# Patient Record
Sex: FEMALE | Race: WHITE | NOT HISPANIC OR LATINO | Employment: OTHER | URBAN - METROPOLITAN AREA
[De-identification: names, ages, dates, MRNs, and addresses within clinical notes are randomized per-mention and may not be internally consistent; named-entity substitution may affect disease eponyms.]

---

## 2017-06-03 ENCOUNTER — OFFICE VISIT (OUTPATIENT)
Dept: URGENT CARE | Facility: PHYSICIAN GROUP | Age: 77
End: 2017-06-03
Payer: MEDICARE

## 2017-06-03 VITALS
DIASTOLIC BLOOD PRESSURE: 74 MMHG | HEART RATE: 89 BPM | TEMPERATURE: 97 F | SYSTOLIC BLOOD PRESSURE: 142 MMHG | OXYGEN SATURATION: 95 %

## 2017-06-03 DIAGNOSIS — S00.35XA ACUTE FOREIGN BODY OF NOSE, INITIAL ENCOUNTER: Primary | ICD-10-CM

## 2017-06-03 PROCEDURE — 1036F TOBACCO NON-USER: CPT | Performed by: PHYSICIAN ASSISTANT

## 2017-06-03 PROCEDURE — 99203 OFFICE O/P NEW LOW 30 MIN: CPT | Performed by: PHYSICIAN ASSISTANT

## 2017-06-03 PROCEDURE — G8432 DEP SCR NOT DOC, RNG: HCPCS | Performed by: PHYSICIAN ASSISTANT

## 2017-06-03 PROCEDURE — 4040F PNEUMOC VAC/ADMIN/RCVD: CPT | Mod: 8P | Performed by: PHYSICIAN ASSISTANT

## 2017-06-03 PROCEDURE — G8421 BMI NOT CALCULATED: HCPCS | Performed by: PHYSICIAN ASSISTANT

## 2017-06-03 PROCEDURE — 1101F PT FALLS ASSESS-DOCD LE1/YR: CPT | Mod: 8P | Performed by: PHYSICIAN ASSISTANT

## 2017-06-03 NOTE — PATIENT INSTRUCTIONS
Nasal Foreign Body  A nasal foreign body is any object inserted inside the nose. Small children often insert small objects in the nose such as beads, coins, and small toys. Older children and adults may also accidentally get an object stuck inside the nose. Having a foreign body in the nose can cause serious medical problems. It may cause trouble breathing. If the object is swallowed and obstructs the esophagus, it can cause difficulty swallowing. A nasal foreign body often causes bleeding of the nose. Depending on the type of object, irritation in the nose may also occur. This can be more serious with certain objects, such as button batteries, magnets, and wooden objects. A foreign body may also cause thick, yellowish, or bad smelling drainage from the nose, as well as pain in the nose and face. These problems can be signs of infection. Nasal foreign bodies require immediate evaluation by a medical professional.   HOME CARE INSTRUCTIONS   · Do not try to remove the object without getting medical advice. Trying to grab the object may push it deeper and make it more difficult to remove.  · Breathe through the mouth until you can see your caregiver. This helps prevent inhalation of the object.  · Keep small objects out of reach of young children.  · Tell your child not to put objects into his or her nose. Tell your child to get help from an adult right away if it happens again.  SEEK MEDICAL CARE IF:   · There is any trouble breathing.  · There is sudden difficulty swallowing, increased drooling, or new chest pain.  · There is any bleeding from the nose.  · The nose continues to drain. An object may still be in the nose.  · A fever, earache, headache, pain in the cheeks or around the eyes, or yellow-green nasal discharge develops. These are signs of a possible sinus infection or ear infection from obstruction of the normal nasal airway.  MAKE SURE YOU:  · Understand these instructions.  · Will watch your  condition.  · Will get help right away if you are not doing well or get worse.     This information is not intended to replace advice given to you by your health care provider. Make sure you discuss any questions you have with your health care provider.     Document Released: 12/15/2001 Document Revised: 03/11/2013 Document Reviewed: 06/07/2012  [employer] Interactive Patient Education ©2016 [employer]

## 2017-06-19 ENCOUNTER — HOSPITAL ENCOUNTER (OUTPATIENT)
Dept: RADIOLOGY | Facility: MEDICAL CENTER | Age: 77
End: 2017-06-19
Attending: GENERAL PRACTICE
Payer: MEDICARE

## 2017-06-19 DIAGNOSIS — S99.922A FOOT INJURY, LEFT, INITIAL ENCOUNTER: ICD-10-CM

## 2017-06-19 PROCEDURE — 73630 X-RAY EXAM OF FOOT: CPT | Mod: LT

## 2017-10-03 ENCOUNTER — HOSPITAL ENCOUNTER (OUTPATIENT)
Dept: LAB | Facility: MEDICAL CENTER | Age: 77
End: 2017-10-03
Attending: GENERAL PRACTICE
Payer: MEDICARE

## 2017-10-03 LAB
25(OH)D3 SERPL-MCNC: 60 NG/ML (ref 30–100)
ALBUMIN SERPL BCP-MCNC: 4.2 G/DL (ref 3.2–4.9)
ALBUMIN/GLOB SERPL: 1.5 G/DL
ALP SERPL-CCNC: 74 U/L (ref 30–99)
ALT SERPL-CCNC: 17 U/L (ref 2–50)
ANION GAP SERPL CALC-SCNC: 8 MMOL/L (ref 0–11.9)
AST SERPL-CCNC: 17 U/L (ref 12–45)
BASOPHILS # BLD AUTO: 1 % (ref 0–1.8)
BASOPHILS # BLD: 0.05 K/UL (ref 0–0.12)
BILIRUB SERPL-MCNC: 0.7 MG/DL (ref 0.1–1.5)
BUN SERPL-MCNC: 12 MG/DL (ref 8–22)
CALCIUM SERPL-MCNC: 9.4 MG/DL (ref 8.5–10.5)
CHLORIDE SERPL-SCNC: 100 MMOL/L (ref 96–112)
CHOLEST SERPL-MCNC: 157 MG/DL (ref 100–199)
CO2 SERPL-SCNC: 28 MMOL/L (ref 20–33)
CREAT SERPL-MCNC: 0.63 MG/DL (ref 0.5–1.4)
CRP SERPL HS-MCNC: 0.19 MG/DL (ref 0–0.75)
EOSINOPHIL # BLD AUTO: 0.2 K/UL (ref 0–0.51)
EOSINOPHIL NFR BLD: 4 % (ref 0–6.9)
ERYTHROCYTE [DISTWIDTH] IN BLOOD BY AUTOMATED COUNT: 46.5 FL (ref 35.9–50)
EST. AVERAGE GLUCOSE BLD GHB EST-MCNC: 111 MG/DL
GFR SERPL CREATININE-BSD FRML MDRD: >60 ML/MIN/1.73 M 2
GLOBULIN SER CALC-MCNC: 2.8 G/DL (ref 1.9–3.5)
GLUCOSE SERPL-MCNC: 83 MG/DL (ref 65–99)
HBA1C MFR BLD: 5.5 % (ref 0–5.6)
HCT VFR BLD AUTO: 48.7 % (ref 37–47)
HDLC SERPL-MCNC: 60 MG/DL
HGB BLD-MCNC: 16.2 G/DL (ref 12–16)
IMM GRANULOCYTES # BLD AUTO: 0.01 K/UL (ref 0–0.11)
IMM GRANULOCYTES NFR BLD AUTO: 0.2 % (ref 0–0.9)
LDLC SERPL CALC-MCNC: 76 MG/DL
LYMPHOCYTES # BLD AUTO: 1.41 K/UL (ref 1–4.8)
LYMPHOCYTES NFR BLD: 28.5 % (ref 22–41)
MCH RBC QN AUTO: 31.6 PG (ref 27–33)
MCHC RBC AUTO-ENTMCNC: 33.3 G/DL (ref 33.6–35)
MCV RBC AUTO: 94.9 FL (ref 81.4–97.8)
MONOCYTES # BLD AUTO: 0.38 K/UL (ref 0–0.85)
MONOCYTES NFR BLD AUTO: 7.7 % (ref 0–13.4)
NEUTROPHILS # BLD AUTO: 2.89 K/UL (ref 2–7.15)
NEUTROPHILS NFR BLD: 58.6 % (ref 44–72)
NRBC # BLD AUTO: 0 K/UL
NRBC BLD AUTO-RTO: 0 /100 WBC
PLATELET # BLD AUTO: 192 K/UL (ref 164–446)
PMV BLD AUTO: 11.4 FL (ref 9–12.9)
POTASSIUM SERPL-SCNC: 4.4 MMOL/L (ref 3.6–5.5)
PROT SERPL-MCNC: 7 G/DL (ref 6–8.2)
RBC # BLD AUTO: 5.13 M/UL (ref 4.2–5.4)
SODIUM SERPL-SCNC: 136 MMOL/L (ref 135–145)
T3FREE SERPL-MCNC: 2.77 PG/ML (ref 2.4–4.2)
T4 FREE SERPL-MCNC: 0.68 NG/DL (ref 0.53–1.43)
TRIGL SERPL-MCNC: 103 MG/DL (ref 0–149)
TSH SERPL DL<=0.005 MIU/L-ACNC: 0.89 UIU/ML (ref 0.3–3.7)
VIT B12 SERPL-MCNC: 784 PG/ML (ref 211–911)
WBC # BLD AUTO: 4.9 K/UL (ref 4.8–10.8)

## 2017-10-03 PROCEDURE — 80061 LIPID PANEL: CPT

## 2017-10-03 PROCEDURE — 82607 VITAMIN B-12: CPT

## 2017-10-03 PROCEDURE — 85025 COMPLETE CBC W/AUTO DIFF WBC: CPT

## 2017-10-03 PROCEDURE — 84481 FREE ASSAY (FT-3): CPT

## 2017-10-03 PROCEDURE — 80053 COMPREHEN METABOLIC PANEL: CPT

## 2017-10-03 PROCEDURE — 83036 HEMOGLOBIN GLYCOSYLATED A1C: CPT

## 2017-10-03 PROCEDURE — 36415 COLL VENOUS BLD VENIPUNCTURE: CPT

## 2017-10-03 PROCEDURE — 82306 VITAMIN D 25 HYDROXY: CPT

## 2017-10-03 PROCEDURE — 86140 C-REACTIVE PROTEIN: CPT

## 2017-10-03 PROCEDURE — 84439 ASSAY OF FREE THYROXINE: CPT

## 2017-10-03 PROCEDURE — 84443 ASSAY THYROID STIM HORMONE: CPT

## 2017-11-27 ENCOUNTER — HOSPITAL ENCOUNTER (OUTPATIENT)
Dept: LAB | Facility: MEDICAL CENTER | Age: 77
End: 2017-11-27
Attending: GENERAL PRACTICE
Payer: MEDICARE

## 2017-11-27 LAB
T3 SERPL-MCNC: 73.4 NG/DL (ref 60–181)
T3FREE SERPL-MCNC: 2.85 PG/ML (ref 2.4–4.2)
T4 FREE SERPL-MCNC: 0.83 NG/DL (ref 0.53–1.43)
T4 SERPL-MCNC: 6.7 UG/DL (ref 4–12)
TSH SERPL DL<=0.005 MIU/L-ACNC: 2.38 UIU/ML (ref 0.3–3.7)

## 2017-11-27 PROCEDURE — 84443 ASSAY THYROID STIM HORMONE: CPT

## 2017-11-27 PROCEDURE — 84439 ASSAY OF FREE THYROXINE: CPT

## 2017-11-27 PROCEDURE — 84481 FREE ASSAY (FT-3): CPT

## 2017-11-27 PROCEDURE — 84480 ASSAY TRIIODOTHYRONINE (T3): CPT

## 2017-11-27 PROCEDURE — 36415 COLL VENOUS BLD VENIPUNCTURE: CPT

## 2018-01-12 ENCOUNTER — HOSPITAL ENCOUNTER (OUTPATIENT)
Dept: LAB | Facility: MEDICAL CENTER | Age: 78
End: 2018-01-12
Attending: GENERAL PRACTICE
Payer: MEDICARE

## 2018-01-12 LAB
T3FREE SERPL-MCNC: 3.08 PG/ML (ref 2.4–4.2)
T4 FREE SERPL-MCNC: 0.87 NG/DL (ref 0.53–1.43)
TSH SERPL DL<=0.005 MIU/L-ACNC: 2.79 UIU/ML (ref 0.38–5.33)

## 2018-01-12 PROCEDURE — 84443 ASSAY THYROID STIM HORMONE: CPT

## 2018-01-12 PROCEDURE — 36415 COLL VENOUS BLD VENIPUNCTURE: CPT

## 2018-01-12 PROCEDURE — 84481 FREE ASSAY (FT-3): CPT

## 2018-01-12 PROCEDURE — 84439 ASSAY OF FREE THYROXINE: CPT

## 2018-02-12 ENCOUNTER — HOSPITAL ENCOUNTER (OUTPATIENT)
Dept: LAB | Facility: MEDICAL CENTER | Age: 78
End: 2018-02-12
Attending: GENERAL PRACTICE
Payer: MEDICARE

## 2018-02-12 LAB
T4 FREE SERPL-MCNC: 0.98 NG/DL (ref 0.53–1.43)
T4 SERPL-MCNC: 5.4 UG/DL (ref 4–12)
TSH SERPL DL<=0.005 MIU/L-ACNC: 3.17 UIU/ML (ref 0.38–5.33)

## 2018-02-12 PROCEDURE — 84439 ASSAY OF FREE THYROXINE: CPT

## 2018-02-12 PROCEDURE — 84443 ASSAY THYROID STIM HORMONE: CPT

## 2018-02-12 PROCEDURE — 36415 COLL VENOUS BLD VENIPUNCTURE: CPT

## 2018-05-25 ENCOUNTER — OFFICE VISIT (OUTPATIENT)
Dept: URGENT CARE | Facility: CLINIC | Age: 78
End: 2018-05-25
Payer: MEDICARE

## 2018-05-25 VITALS
TEMPERATURE: 98.5 F | WEIGHT: 158 LBS | SYSTOLIC BLOOD PRESSURE: 160 MMHG | RESPIRATION RATE: 16 BRPM | HEART RATE: 77 BPM | BODY MASS INDEX: 26.98 KG/M2 | HEIGHT: 64 IN | DIASTOLIC BLOOD PRESSURE: 70 MMHG | OXYGEN SATURATION: 93 %

## 2018-05-25 DIAGNOSIS — B02.9 HERPES ZOSTER WITHOUT COMPLICATION: ICD-10-CM

## 2018-05-25 PROCEDURE — 99214 OFFICE O/P EST MOD 30 MIN: CPT | Performed by: PHYSICIAN ASSISTANT

## 2018-05-25 RX ORDER — VALACYCLOVIR HYDROCHLORIDE 1 G/1
1000 TABLET, FILM COATED ORAL 3 TIMES DAILY
Qty: 21 TAB | Refills: 0 | Status: SHIPPED | OUTPATIENT
Start: 2018-05-25 | End: 2018-06-01

## 2018-05-25 RX ORDER — UBIDECARENONE 75 MG
100 CAPSULE ORAL DAILY
COMMUNITY
End: 2022-10-12

## 2018-05-25 ASSESSMENT — ENCOUNTER SYMPTOMS
SHORTNESS OF BREATH: 0
COUGH: 0
PALPITATIONS: 0
FEVER: 0

## 2018-05-25 ASSESSMENT — PATIENT HEALTH QUESTIONNAIRE - PHQ9: CLINICAL INTERPRETATION OF PHQ2 SCORE: 0

## 2018-05-25 NOTE — PROGRESS NOTES
Subjective:      Norah Mchugh is a 77 y.o. female who presents with Herpes Zoster (rash on left flank, front to back, x4days irritating)            Rash   This is a new problem. The current episode started in the past 7 days. The problem is unchanged. Location: left torso and back. The rash is characterized by blistering, redness and pain. She was exposed to nothing. Pertinent negatives include no cough, fever or shortness of breath. Past treatments include nothing.       Review of Systems   Constitutional: Negative for fever and malaise/fatigue.   Respiratory: Negative for cough and shortness of breath.    Cardiovascular: Negative for chest pain and palpitations.   Skin: Positive for itching and rash.   All other systems reviewed and are negative.    PMH:  has a past medical history of CATARACT; Glaucoma; Hypertension; Pain (05-18-12); Polio; and Unspecified disorder of thyroid.  MEDS:   Current Outpatient Prescriptions:   •  cyanocobalamin (VITAMIN B-12) 100 MCG Tab, Take 100 mcg by mouth every day., Disp: , Rfl:   •  valacyclovir (VALTREX) 1 GM Tab, Take 1 Tab by mouth 3 times a day for 7 days., Disp: 21 Tab, Rfl: 0  •  ARMOUR THYROID PO, Take 65 mg by mouth every day.  , Disp: , Rfl:   •  Cholecalciferol (VITAMIN D3) 5000 UNIT TABS, Take  by mouth every day., Disp: , Rfl:   •  NATURE-THROID 65 MG tablet, Take 65 mg by mouth., Disp: , Rfl: 3  •  Non Formulary Request, Vessel care, Disp: , Rfl:   •  Non Formulary Request, Blue louie, three tabs, BID., Disp: , Rfl:   •  docosahexanoic acid (OMEGA 3 FA) 1000 MG CAPS, Take 1,000 mg by mouth 3 times a day, with meals., Disp: , Rfl:   •  rosuvastatin (CRESTOR) 5 MG TABS, Take 1 Tab by mouth every evening., Disp: 30 Tab, Rfl: 11  •  atorvastatin (LIPITOR) 10 MG TABS, Take 1 Tab by mouth every day., Disp: 30 Tab, Rfl: 11  •  aspirin EC (ECOTRIN) 81 MG TBEC, Take 1 Tab by mouth every day., Disp: 30 Tab, Rfl: 11  •  Ascorbic Acid (VITAMIN C PO), Take 2,000 mg  "by mouth every day., Disp: , Rfl:   ALLERGIES:   Allergies   Allergen Reactions   • Milk Protein Nausea   • Other Drug Nausea     An antibiotic, name unknown     SURGHX:   Past Surgical History:   Procedure Laterality Date   • CATARACT PHACO WITH IOL  2/26/2013    Performed by David Bay M.D. at SURGERY SAME DAY [facility]S   • INCISION AND DRAINAGE ORTHOPEDIC  8/30/2012    Performed by GHUALM COE at SURGERY [facility] ORS   • HIP ARTHROPLASTY TOTAL  7/26/2012    Performed by Ghulam Coe M.D. at SURGERY [facility] ORS   • JOINT INJECTION DIAGNOSTIC  5/24/2012    Performed by GHULAM COE at SURGERY [facility] ORS   • OTHER ORTHOPEDIC SURGERY  2007    left hip fx (repair)   • OTHER ORTHOPEDIC SURGERY  2002    R foot    • BUNIONECTOMY      right, has to wear shoe when up   • OTHER      macular hole repair     SOCHX:  reports that she quit smoking about 46 years ago. Her smoking use included Cigarettes. She smoked 2.00 packs per day. She has never used smokeless tobacco. She reports that she drinks alcohol. She reports that she does not use drugs.  FH: Family history was reviewed, no pertinent findings to report  Medications, Allergies, and current problem list reviewed today in Epic       Objective:     /70   Pulse 77   Temp 36.9 °C (98.5 °F)   Resp 16   Ht 1.626 m (5' 4\")   Wt 71.7 kg (158 lb)   SpO2 93%   BMI 27.12 kg/m²      Physical Exam   Constitutional: She appears well-developed and well-nourished.   Cardiovascular: Normal rate, regular rhythm and normal heart sounds.    Pulmonary/Chest: Effort normal and breath sounds normal.   Skin: Skin is warm and dry. Rash noted. Rash is vesicular. There is erythema.        Psychiatric: She has a normal mood and affect. Her behavior is normal. Judgment and thought content normal.   Vitals reviewed.              Assessment/Plan:     1. Herpes zoster without complication    - valacyclovir (VALTREX) 1 GM Tab; Take 1 Tab by mouth 3 times a " day for 7 days.  Dispense: 21 Tab; Refill: 0    Differential diagnosis, natural history, supportive care discussed. Follow-up with primary care provider within 7-10 days, emergency room precautions discussed.  Patient and/or family appears understanding of information.  Handout and review of patients diagnosis and treatment was discussed extensively.

## 2018-06-14 ENCOUNTER — OFFICE VISIT (OUTPATIENT)
Dept: MEDICAL GROUP | Facility: PHYSICIAN GROUP | Age: 78
End: 2018-06-14
Payer: MEDICARE

## 2018-06-14 VITALS
TEMPERATURE: 97.9 F | DIASTOLIC BLOOD PRESSURE: 72 MMHG | OXYGEN SATURATION: 93 % | SYSTOLIC BLOOD PRESSURE: 132 MMHG | HEART RATE: 83 BPM | HEIGHT: 64 IN | BODY MASS INDEX: 27.31 KG/M2 | WEIGHT: 160 LBS

## 2018-06-14 DIAGNOSIS — R79.89 LOW SERUM VITAMIN D: ICD-10-CM

## 2018-06-14 DIAGNOSIS — E03.9 ACQUIRED HYPOTHYROIDISM: ICD-10-CM

## 2018-06-14 DIAGNOSIS — E78.5 DYSLIPIDEMIA: ICD-10-CM

## 2018-06-14 DIAGNOSIS — B02.29 OTHER POSTHERPETIC NERVOUS SYSTEM INVOLVEMENT: ICD-10-CM

## 2018-06-14 PROCEDURE — 99204 OFFICE O/P NEW MOD 45 MIN: CPT | Performed by: FAMILY MEDICINE

## 2018-06-14 ASSESSMENT — PAIN SCALES - GENERAL: PAINLEVEL: 5=MODERATE PAIN

## 2018-06-14 NOTE — PROGRESS NOTES
Norah Mchugh is a 77 y.o. female here for thyroid issues, herpes zoster outbreak, low vitamin D, elevated lipids  HPI: This is a pleasant 77-year-old female here today to establish care and presents for the following concerns:  Acquired hypothyroidism  This problem is new to me. Patient currently takes nature thyroid 65 mg daily; currently denies any issues or temperature intolerance, skin changes, hair changes, anxiety. She reports that it has been several months since her last lab evaluation and she is requesting new lab work today.    Other postherpetic nervous system involvement  This problem is new to me. Patient reports that she was diagnosed with herpes zoster outbreak proximally 2 weeks ago. She states that while she still has the rash over scan there are no pustules that are opened or drained. The skin is very itchy and she also has sharp stabbing pain in the area of the rash.    Low serum vitamin D  This problem is new to me. Reviewing patient's chart shows that her vitamin D level has been suboptimal in the past. She currently takes a little vitamin D but has not had her labs rechecked in several months    Dyslipidemia  This problem is new to me. Patient reports in the past that she has had some elevated lipids to be: She currently takes a little vitamin D and is not interested in taking statins anymore. She states that the statins had multiple side effects including muscle cramps and weakness. Given her history of polio she does not want to try these medications regardless of what her levels are    Current medicines (including changes today)  Current Outpatient Prescriptions   Medication Sig Dispense Refill   • OIL OF OREGANO PO Take  by mouth.     • cyanocobalamin (VITAMIN B-12) 100 MCG Tab Take 100 mcg by mouth every day.     • NATURE-THROID 65 MG tablet Take 65 mg by mouth.  3   • Ascorbic Acid (VITAMIN C PO) Take 2,000 mg by mouth every day.     • Cholecalciferol (VITAMIN D3) 5000 UNIT  "TABS Take  by mouth every day.     • docosahexanoic acid (OMEGA 3 FA) 1000 MG CAPS Take 1,000 mg by mouth 3 times a day, with meals.       No current facility-administered medications for this visit.      She  has a past medical history of CATARACT; Glaucoma; Hypertension; Pain (12); Polio; and Unspecified disorder of thyroid.  She  has a past surgical history that includes joint injection diagnostic (2012); other; bunionectomy; other orthopedic surgery (); other orthopedic surgery (); incision and drainage orthopedic (2012); cataract phaco with iol (2013); and hip arthroplasty total (2012).  Social History   Substance Use Topics   • Smoking status: Former Smoker     Packs/day: 2.00     Types: Cigarettes     Quit date: 1972   • Smokeless tobacco: Never Used   • Alcohol use Yes      Comment: 2 glasses of red wine/night      Social History     Social History Narrative   • No narrative on file     Family History   Problem Relation Age of Onset   • Heart Disease Mother    • Other Father      suicide   • Cancer Sister      liver   • Psychiatry Brother      depression   • Other Brother      suicide     Family Status   Relation Status   • Mother    • Father    • Sister    • Brother          ROS  + Itchy scan along with sharp pain in the area of herpes zoster outbreak; no chest pain, no shortness of breath, no abdominal pain  All other systems reviewed and are negative     Objective:     Blood pressure 132/72, pulse 83, temperature 36.6 °C (97.9 °F), height 1.626 m (5' 4\"), weight 72.6 kg (160 lb), SpO2 93 %. Body mass index is 27.46 kg/m².  Physical Exam:    Constitutional: Alert, no distress.  Skin: Warm, dry, good turgor, no rashes in visible areas.  Eye: Equal, round and reactive, conjunctiva clear, lids normal.  ENMT: Lips without lesions, good dentition, oropharynx clear.  Neck: Trachea midline, no masses, no thyromegaly. No cervical or " supraclavicular lymphadenopathy.  Respiratory: Unlabored respiratory effort, lungs clear to auscultation, no wheezes, no ronchi.  Cardiovascular: Normal S1, S2, no murmur, no edema.  Abdomen: Soft, non-tender, no masses, no hepatosplenomegaly.  Psych: Alert and oriented x3, normal affect and mood.        Assessment and Plan:   The following treatment plan was discussed     1. Acquired hypothyroidism  TSH    FREE THYROXINE    TRIIDOTHYRONINE    THYROID PEROXIDASE  (TPO) AB    Stable. Continue current medications; labs ordered for reevaluation; monitor for results   2. Other postherpetic nervous system involvement      Uncontrolled; will contact [facility] for possible topical cream to help with pain management   3. Low serum vitamin D  VITAMIN D,25 HYDROXY    Uncontrolled; continue supplementation; labs ordered for reevaluation; monitor for results   4. Dyslipidemia  COMP METABOLIC PANEL    LIPID PROFILE    stable; labs ordered for re-evaluation        Records requested.  Followup: Return in about 4 months (around 10/14/2018) for medication/lab review, Short.

## 2018-06-14 NOTE — ASSESSMENT & PLAN NOTE
This problem is new to me. Patient currently takes nature thyroid 65 mg daily; currently denies any issues or temperature intolerance, skin changes, hair changes, anxiety. She reports that it has been several months since her last lab evaluation and she is requesting new lab work today.

## 2018-06-14 NOTE — ASSESSMENT & PLAN NOTE
This problem is new to me. Reviewing patient's chart shows that her vitamin D level has been suboptimal in the past. She currently takes a little vitamin D but has not had her labs rechecked in several months

## 2018-06-14 NOTE — ASSESSMENT & PLAN NOTE
This problem is new to me. Patient reports in the past that she has had some elevated lipids to be: She currently takes a little vitamin D and is not interested in taking statins anymore. She states that the statins had multiple side effects including muscle cramps and weakness. Given her history of polio she does not want to try these medications regardless of what her levels are

## 2018-06-14 NOTE — ASSESSMENT & PLAN NOTE
This problem is new to me. Patient reports that she was diagnosed with herpes zoster outbreak proximally 2 weeks ago. She states that while she still has the rash over scan there are no pustules that are opened or drained. The skin is very itchy and she also has sharp stabbing pain in the area of the rash.

## 2018-06-19 ENCOUNTER — HOSPITAL ENCOUNTER (OUTPATIENT)
Dept: LAB | Facility: MEDICAL CENTER | Age: 78
End: 2018-06-19
Attending: FAMILY MEDICINE
Payer: MEDICARE

## 2018-06-19 DIAGNOSIS — E03.9 ACQUIRED HYPOTHYROIDISM: ICD-10-CM

## 2018-06-19 DIAGNOSIS — E78.5 DYSLIPIDEMIA: ICD-10-CM

## 2018-06-19 DIAGNOSIS — R79.89 LOW SERUM VITAMIN D: ICD-10-CM

## 2018-06-19 LAB
25(OH)D3 SERPL-MCNC: 57 NG/ML (ref 30–100)
ALBUMIN SERPL BCP-MCNC: 4.2 G/DL (ref 3.2–4.9)
ALBUMIN/GLOB SERPL: 1.6 G/DL
ALP SERPL-CCNC: 82 U/L (ref 30–99)
ALT SERPL-CCNC: 12 U/L (ref 2–50)
ANION GAP SERPL CALC-SCNC: 9 MMOL/L (ref 0–11.9)
AST SERPL-CCNC: 16 U/L (ref 12–45)
BILIRUB SERPL-MCNC: 0.8 MG/DL (ref 0.1–1.5)
BUN SERPL-MCNC: 9 MG/DL (ref 8–22)
CALCIUM SERPL-MCNC: 9.7 MG/DL (ref 8.5–10.5)
CHLORIDE SERPL-SCNC: 97 MMOL/L (ref 96–112)
CHOLEST SERPL-MCNC: 150 MG/DL (ref 100–199)
CO2 SERPL-SCNC: 30 MMOL/L (ref 20–33)
CREAT SERPL-MCNC: 0.64 MG/DL (ref 0.5–1.4)
GLOBULIN SER CALC-MCNC: 2.7 G/DL (ref 1.9–3.5)
GLUCOSE SERPL-MCNC: 95 MG/DL (ref 65–99)
HDLC SERPL-MCNC: 67 MG/DL
LDLC SERPL CALC-MCNC: 68 MG/DL
POTASSIUM SERPL-SCNC: 4.6 MMOL/L (ref 3.6–5.5)
PROT SERPL-MCNC: 6.9 G/DL (ref 6–8.2)
SODIUM SERPL-SCNC: 136 MMOL/L (ref 135–145)
T3 SERPL-MCNC: 54.1 NG/DL (ref 60–181)
T4 FREE SERPL-MCNC: 0.88 NG/DL (ref 0.53–1.43)
THYROPEROXIDASE AB SERPL-ACNC: 1.3 IU/ML (ref 0–9)
TRIGL SERPL-MCNC: 75 MG/DL (ref 0–149)
TSH SERPL DL<=0.005 MIU/L-ACNC: 1.34 UIU/ML (ref 0.38–5.33)

## 2018-06-19 PROCEDURE — 82306 VITAMIN D 25 HYDROXY: CPT

## 2018-06-19 PROCEDURE — 86376 MICROSOMAL ANTIBODY EACH: CPT

## 2018-06-19 PROCEDURE — 84443 ASSAY THYROID STIM HORMONE: CPT

## 2018-06-19 PROCEDURE — 36415 COLL VENOUS BLD VENIPUNCTURE: CPT

## 2018-06-19 PROCEDURE — 80061 LIPID PANEL: CPT

## 2018-06-19 PROCEDURE — 84480 ASSAY TRIIODOTHYRONINE (T3): CPT

## 2018-06-19 PROCEDURE — 84439 ASSAY OF FREE THYROXINE: CPT

## 2018-06-19 PROCEDURE — 80053 COMPREHEN METABOLIC PANEL: CPT

## 2018-06-20 RX ORDER — GABAPENTIN 100 MG/1
100 CAPSULE ORAL 3 TIMES DAILY PRN
Qty: 90 CAP | Refills: 1 | Status: SHIPPED | OUTPATIENT
Start: 2018-06-20 | End: 2018-09-12

## 2018-06-22 ENCOUNTER — TELEPHONE (OUTPATIENT)
Dept: MEDICAL GROUP | Facility: PHYSICIAN GROUP | Age: 78
End: 2018-06-22

## 2018-06-22 NOTE — TELEPHONE ENCOUNTER
Pt is requesting a refill of the cream she uses for her shingles. I do not see anything in her chart. I have left a message at [facility] to clarify what cream she is talking about. Do you happen to know?

## 2018-06-22 NOTE — TELEPHONE ENCOUNTER
I do but it was a verbal order - call [facility] pharmacy for verbal on refill    Delores Grande D.O.

## 2018-09-12 ENCOUNTER — OFFICE VISIT (OUTPATIENT)
Dept: MEDICAL GROUP | Age: 78
End: 2018-09-12
Payer: MEDICARE

## 2018-09-12 VITALS
DIASTOLIC BLOOD PRESSURE: 80 MMHG | HEIGHT: 64 IN | OXYGEN SATURATION: 92 % | HEART RATE: 80 BPM | SYSTOLIC BLOOD PRESSURE: 132 MMHG | TEMPERATURE: 97.1 F | WEIGHT: 160.1 LBS | BODY MASS INDEX: 27.33 KG/M2

## 2018-09-12 DIAGNOSIS — E03.9 ACQUIRED HYPOTHYROIDISM: ICD-10-CM

## 2018-09-12 DIAGNOSIS — M81.0 AGE-RELATED OSTEOPOROSIS WITHOUT CURRENT PATHOLOGICAL FRACTURE: ICD-10-CM

## 2018-09-12 DIAGNOSIS — H91.13 PRESBYCUSIS OF BOTH EARS: ICD-10-CM

## 2018-09-12 DIAGNOSIS — Z00.00 MEDICARE ANNUAL WELLNESS VISIT, SUBSEQUENT: ICD-10-CM

## 2018-09-12 DIAGNOSIS — H61.21 IMPACTED CERUMEN OF RIGHT EAR: ICD-10-CM

## 2018-09-12 DIAGNOSIS — A80.9 POLIOMYELITIS: ICD-10-CM

## 2018-09-12 DIAGNOSIS — J43.8 OTHER EMPHYSEMA (HCC): ICD-10-CM

## 2018-09-12 DIAGNOSIS — M16.12 PRIMARY OSTEOARTHRITIS OF LEFT HIP: ICD-10-CM

## 2018-09-12 DIAGNOSIS — Z87.891 FORMER SMOKER: ICD-10-CM

## 2018-09-12 PROBLEM — B02.29 OTHER POSTHERPETIC NERVOUS SYSTEM INVOLVEMENT: Status: RESOLVED | Noted: 2018-06-14 | Resolved: 2018-09-12

## 2018-09-12 PROBLEM — R79.89 LOW SERUM VITAMIN D: Status: RESOLVED | Noted: 2018-06-14 | Resolved: 2018-09-12

## 2018-09-12 PROBLEM — E78.5 DYSLIPIDEMIA: Status: RESOLVED | Noted: 2018-06-14 | Resolved: 2018-09-12

## 2018-09-12 PROCEDURE — G0439 PPPS, SUBSEQ VISIT: HCPCS | Mod: 25 | Performed by: FAMILY MEDICINE

## 2018-09-12 PROCEDURE — 69210 REMOVE IMPACTED EAR WAX UNI: CPT | Performed by: FAMILY MEDICINE

## 2018-09-12 ASSESSMENT — ACTIVITIES OF DAILY LIVING (ADL): BATHING_REQUIRES_ASSISTANCE: 0

## 2018-09-12 ASSESSMENT — PATIENT HEALTH QUESTIONNAIRE - PHQ9: CLINICAL INTERPRETATION OF PHQ2 SCORE: 0

## 2018-09-12 ASSESSMENT — ENCOUNTER SYMPTOMS: GENERAL WELL-BEING: EXCELLENT

## 2018-09-12 NOTE — PATIENT INSTRUCTIONS
Wash your nose with neti pot twice daily       2 sprays per nostril once daily before bed time (make sure to irrigate your nose with saline prior)      Take 1 pill daily

## 2018-09-12 NOTE — PROGRESS NOTES
Chief Complaint   Patient presents with   •  [facility] care, annual wellness visit        •         •               HPI:  Norah Mchugh is a 78 y.o. here for Medicare Annual Wellness Visit     Patient Active Problem List    Diagnosis Date Noted   • Former smoker 09/12/2018   • Other emphysema (HCC) 09/12/2018   • Age-related osteoporosis without current pathological fracture 09/12/2018   • Presbycusis of both ears 09/12/2018   • Acquired hypothyroidism 06/14/2018   • Agatston coronary artery calcium score 119, pred LAD 08/28/2014   • Poliomyelitis (historic) 08/28/2014   • Osteoarthritis of hip, left 07/26/2012       Current Outpatient Prescriptions   Medication Sig Dispense Refill   • OIL OF OREGANO PO Take  by mouth.     • cyanocobalamin (VITAMIN B-12) 100 MCG Tab Take 100 mcg by mouth every day.     • NATURE-THROID 65 MG tablet Take 65 mg by mouth.  3   • Cholecalciferol (VITAMIN D3) 5000 UNIT TABS Take  by mouth every day.       No current facility-administered medications for this visit.             Current supplements as per medication list.       Allergies: Milk protein and Other drug    Current social contact/activities: , lives with her , her children live nearby, there is no frequent with friends and family    She  reports that she quit smoking about 46 years ago. Her smoking use included Cigarettes. She has a 40.00 pack-year smoking history. She has never used smokeless tobacco. She reports that she drinks about 8.4 oz of alcohol per week . She reports that she does not use drugs.  Counseling given: No      DPA/Advanced Directive:  Patient does not have an Advanced Directive.  A packet and workshop information was given on Advanced Directives.    ROS:    Gait: Uses a wheelchair  Ostomy: No  Other tubes: No  Amputations: No  Chronic oxygen use: No  Last eye exam: 2016  Wears hearing aids: yes, bilateral   : Denies any urinary leakage during the last 6  months    Screening:  Depression Screening    Little interest or pleasure in doing things?  0 - not at all  Feeling down, depressed , or hopeless? 0 - not at all  Patient Health Questionnaire Score: 0   No depressive symptoms identified     Screening for Cognitive Impairment    Three Minute Recall (leader, season, table) 3/3    Royce clock face with all 12 numbers and set the hands to show 10 past 11.  Yes    No cognitive concerns identified     Fall Risk Assessment    Has the patient had two or more falls in the last year or any fall with injury in the last year?  No    Safety Assessment    Throw rugs on floor.  No  Handrails on all stairs.  No  Good lighting in all hallways.  No  Difficulty hearing.  Yes  Patient counseled about all safety risks that were identified.    Functional Assessment ADLs    Are there any barriers preventing you from cooking for yourself or meeting nutritional needs?  No.    Are there any barriers preventing you from driving safely or obtaining transportation?  No.    Are there any barriers preventing you from using a telephone or calling for help?  No.    Are there any barriers preventing you from shopping?  No.    Are there any barriers preventing you from taking care of your own finances?  No.    Are there any barriers preventing you from managing your medications?  No.    Are there any barriers preventing you from showering, bathing or dressing yourself?  No.    Are you currently engaging in any exercise or physical activity?  Yes.     What is your perception of your health?  Excellent.      Health Maintenance Summary                Annual Wellness Visit Overdue 1940     IMM DTaP/Tdap/Td Vaccine Overdue 6/20/1958     IMM ZOSTER VACCINES Overdue 6/20/1989     BONE DENSITY Overdue 6/20/2004     IMM PNEUMOCOCCAL 65+ (ADULT) LOW/MEDIUM RISK SERIES Overdue 6/20/2004     IMM INFLUENZA Overdue 9/1/2018           Patient Care Team:  Myriam Manzanares M.D. as PCP - General (Family  "Medicine)        Social History   Substance Use Topics   • Smoking status: Former Smoker     Packs/day: 2.00     Years: 20.00     Types: Cigarettes     Quit date: 1/1/1972   • Smokeless tobacco: Never Used   • Alcohol use 8.4 oz/week     14 Glasses of wine per week      Comment: 2 glasses of red wine/night      Family History   Problem Relation Age of Onset   • Heart Disease Mother    • Other Father         suicide   • Psychiatry Father    • Cancer Sister         liver   • Psychiatry Brother         depression   • Other Brother         suicide     She  has a past medical history of CATARACT; Glaucoma; Hypertension; Pain (05-18-12); Polio; and Unspecified disorder of thyroid.   Past Surgical History:   Procedure Laterality Date   • CATARACT PHACO WITH IOL  2/26/2013    Performed by David Bay M.D. at SURGERY SAME DAY [facility]   • INCISION AND DRAINAGE ORTHOPEDIC  8/30/2012    Performed by GHULAM COE at SURGERY [facility]   • HIP ARTHROPLASTY TOTAL  7/26/2012    Performed by Ghulam Coe M.D. at SURGERY [facility]   • JOINT INJECTION DIAGNOSTIC  5/24/2012    Performed by GHULAM COE at SURGERY [facility]   • OTHER ORTHOPEDIC SURGERY  2007    left hip fx (repair)   • OTHER ORTHOPEDIC SURGERY  2002    R foot    • BUNIONECTOMY      right, has to wear shoe when up   • OTHER      macular hole repair       Exam:   Blood pressure 132/80, pulse 80, temperature 36.2 °C (97.1 °F), height 1.626 m (5' 4\"), weight 72.6 kg (160 lb 1.6 oz), SpO2 92 %, not currently breastfeeding. Body mass index is 27.48 kg/m².    Hearing good.    Dentition fair  Alert, oriented in no acute distress.  Eye contact is good, speech goal directed, affect calm    Assessment and Plan. The following treatment and monitoring plan is recommended:      1. Poliomyelitis (historic)  Patient was diagnosed with poliomyelitis when she was a child.  However, patient did not have any ambulation difficulty or problems until she " was in her 60s.  She suffered a ground-level fall couple years ago leading to left hip fracture requiring total hip replacement.  Unfortunately, she suffered postop infection needing I&D.  She later developed chronic, worsening right knee pain further affecting her ambulation.  The strength of her lower extremity continues to decline further despite physical therapy.  Patient is now wheelchair-bound.  Patient is not interested in further treatment or evaluation at this point.  She also declined physical therapy.  She exercises regularly to improve the strength of upper extremity.  She is able to transfer herself safely.  Patient is pleased with her status and does not wish to have more treatment or evaluation.  Plans:   -Home safety discussed with patient  -Continue regular exercise of the upper and lower extremity    2. Former smoker  3. Other emphysema (HCC)  Patient had history of chronic tobacco abuse.  She used to smoke 2 pack per day for 20 years.  She quit in 1972.  She denies any respiratory symptoms.  Incidental findings of pulmonary emphysema per CT scan done in 2014.  Plans:  -We will monitor.    3. Acquired hypothyroidism  Chronic, currently taking nature-thyroid 65 mg daily.  Her recent thyroid function test was normal in June 2018.  Plans:  -Continue nature thyroid at current dose    5. Primary osteoarthritis of left hip  Status post total hip replacement in 2007.  Patient is wheelchair-bound due to musculoskeletal weakness related to polio.  She denies any pain, will monitor.    6. Age-related osteoporosis without current pathological fracture  Patient was diagnosed with osteoporosis by previous PCP.  Records are not available for review.  Patient declined any treatment at this time. Plans:   - Calcium and Vitamin D  - Weight-bearing aerobic and strengthening exercises can decrease risk of falls and fractures by improving muscle strength, coordination, balance, and mobility  - Limit caffeine intake to  2 or fewer servings per day  - Limit alcohol consumption to one drink per day  - pt was counseled on fall risk prevention and annual eye exam    7. Presbycusis of both ears  History of chronic presbycusis, wear hearing aids bilaterally.    8. Cerumen impaction, right  Exam was notable for moderate cerumen impaction on the right.  Plans:  - ear lavage    Procedure note: ear wax removal.   Right ear was partially irrigated by MA. I personally removed the rest of ear wax using a lighted curette pt tolerated the procedure well, no immediate complication noted.     Patient declined all vaccines.    Services suggested: No services needed at this time  Health Care Screening: Age-appropriate preventive services recommended by USPTF and ACIP covered by Medicare were discussed today. Services ordered if indicated and agreed upon by the patient.  Referrals offered: Community-based lifestyle interventions to reduce health risks and promote self-management and wellness, fall prevention, nutrition, physical activity, tobacco-use cessation, weight loss, and mental health services as per orders if indicated.    Discussion today about general wellness and lifestyle habits:    · Prevent falls and reduce trip hazards; Cautioned about securing or removing rugs.  · Have a working fire alarm and carbon monoxide detector;   · Engage in regular physical activity and social activities     Follow-up: Return in about 6 months (around 3/12/2019) for Multiple issues.

## 2018-10-29 ENCOUNTER — TELEPHONE (OUTPATIENT)
Dept: MEDICAL GROUP | Age: 78
End: 2018-10-29

## 2018-10-29 ENCOUNTER — APPOINTMENT (OUTPATIENT)
Dept: MEDICAL GROUP | Age: 78
End: 2018-10-29
Payer: MEDICARE

## 2018-10-29 DIAGNOSIS — H61.20 WAX IN EAR: ICD-10-CM

## 2018-10-29 NOTE — TELEPHONE ENCOUNTER
1. Caller Name: Norah Mchugh    Call Back Number: [phone] (home)         Patient approves a detailed voicemail message: N\A    Patient is on the MA Schedule today for Ear lavage     SPECIFIC Action To Be Taken: Orders pending, please sign.

## 2018-11-09 ENCOUNTER — OFFICE VISIT (OUTPATIENT)
Dept: MEDICAL GROUP | Age: 78
End: 2018-11-09
Payer: MEDICARE

## 2018-11-09 VITALS
OXYGEN SATURATION: 90 % | DIASTOLIC BLOOD PRESSURE: 72 MMHG | TEMPERATURE: 98.4 F | BODY MASS INDEX: 27.31 KG/M2 | WEIGHT: 160 LBS | HEART RATE: 82 BPM | SYSTOLIC BLOOD PRESSURE: 128 MMHG | HEIGHT: 64 IN

## 2018-11-09 DIAGNOSIS — J20.9 ACUTE BRONCHITIS, UNSPECIFIED ORGANISM: ICD-10-CM

## 2018-11-09 LAB
FLUAV+FLUBV AG SPEC QL IA: NEGATIVE
INT CON NEG: NEGATIVE
INT CON POS: POSITIVE

## 2018-11-09 PROCEDURE — 99202 OFFICE O/P NEW SF 15 MIN: CPT | Performed by: INTERNAL MEDICINE

## 2018-11-09 PROCEDURE — 87804 INFLUENZA ASSAY W/OPTIC: CPT | Performed by: INTERNAL MEDICINE

## 2018-11-09 RX ORDER — GUAIFENESIN AND DEXTROMETHORPHAN HYDROBROMIDE 100; 10 MG/5ML; MG/5ML
10 SOLUTION ORAL EVERY 6 HOURS PRN
Qty: 560 ML | Refills: 1 | Status: SHIPPED | OUTPATIENT
Start: 2018-11-09 | End: 2018-11-15

## 2018-11-09 RX ORDER — AMOXICILLIN AND CLAVULANATE POTASSIUM 875; 125 MG/1; MG/1
1 TABLET, FILM COATED ORAL 2 TIMES DAILY
Qty: 20 TAB | Refills: 0 | Status: SHIPPED | OUTPATIENT
Start: 2018-11-09 | End: 2018-11-15

## 2018-11-09 ASSESSMENT — ENCOUNTER SYMPTOMS
WHEEZING: 0
FEVER: 0
RHINORRHEA: 1
COUGH: 1
HEADACHES: 0

## 2018-11-09 ASSESSMENT — COPD QUESTIONNAIRES: COPD: 1

## 2018-11-09 NOTE — PROGRESS NOTES
"Subjective:      Norah Mchugh is a 78 y.o. female who presents with Cough (x4 days non productive)          .enc  Cough   This is a new problem. The current episode started in the past 7 days. The problem has been gradually improving. The problem occurs every few minutes. The cough is non-productive. Associated symptoms include rhinorrhea. Pertinent negatives include no fever, headaches, rash or wheezing. The symptoms are aggravated by cold air and lying down. Risk factors for lung disease include smoking/tobacco exposure. She has tried OTC cough suppressant for the symptoms. The treatment provided mild relief. Her past medical history is significant for COPD. There is no history of bronchiectasis, bronchitis, environmental allergies or pneumonia.       Review of Systems   Constitutional: Negative for fever.   HENT: Positive for rhinorrhea.    Respiratory: Positive for cough. Negative for wheezing.    Skin: Negative for rash.   Neurological: Negative for headaches.   Endo/Heme/Allergies: Negative for environmental allergies.          Objective:     /72 (BP Location: Right arm, Patient Position: Sitting)   Pulse 82   Temp 36.9 °C (98.4 °F) (Temporal)   Ht 1.626 m (5' 4.02\")   Wt 72.6 kg (160 lb)   SpO2 90%   BMI 27.45 kg/m²      Physical Exam   HENT:   Right Ear: External ear normal.   Left Ear: External ear normal.   Nose: Nose normal.   Mouth/Throat: Oropharynx is clear and moist. No oropharyngeal exudate.   Neck: No JVD present. No tracheal deviation present.   Pulmonary/Chest: Effort normal and breath sounds normal. No stridor. No respiratory distress. She has no wheezes. She has no rales. She exhibits no tenderness.   Lymphadenopathy:     She has no cervical adenopathy.          No visits with results within 1 Month(s) from this visit.   Latest known visit with results is:   Hospital Outpatient Visit on 06/19/2018   Component Date Value   • TSH 06/19/2018 1.340    • Free T-4 06/19/2018 " 0.88    • Sodium 06/19/2018 136    • Potassium 06/19/2018 4.6    • Chloride 06/19/2018 97    • Co2 06/19/2018 30    • Anion Gap 06/19/2018 9.0    • Glucose 06/19/2018 95    • Bun 06/19/2018 9    • Creatinine 06/19/2018 0.64    • Calcium 06/19/2018 9.7    • AST(SGOT) 06/19/2018 16    • ALT(SGPT) 06/19/2018 12    • Alkaline Phosphatase 06/19/2018 82    • Total Bilirubin 06/19/2018 0.8    • Albumin 06/19/2018 4.2    • Total Protein 06/19/2018 6.9    • Globulin 06/19/2018 2.7    • A-G Ratio 06/19/2018 1.6    • Cholesterol,Tot 06/19/2018 150    • Triglycerides 06/19/2018 75    • HDL 06/19/2018 67    • LDL 06/19/2018 68    • 25-Hydroxy   Vitamin D 25 06/19/2018 57    • T3 06/19/2018 54.1*   • Microsomal -Tpo- Abs 06/19/2018 1.3    • GFR If  06/19/2018 >60    • GFR If Non  Ameri* 06/19/2018 >60       Lab Results   Component Value Date/Time    HBA1C 5.5 10/03/2017 08:34 AM    HBA1C 5.3 06/03/2015 07:43 AM     Lab Results   Component Value Date/Time    SODIUM 136 06/19/2018 09:35 AM    POTASSIUM 4.6 06/19/2018 09:35 AM    CHLORIDE 97 06/19/2018 09:35 AM    CO2 30 06/19/2018 09:35 AM    GLUCOSE 95 06/19/2018 09:35 AM    BUN 9 06/19/2018 09:35 AM    CREATININE 0.64 06/19/2018 09:35 AM    ALKPHOSPHAT 82 06/19/2018 09:35 AM    ASTSGOT 16 06/19/2018 09:35 AM    ALTSGPT 12 06/19/2018 09:35 AM    TBILIRUBIN 0.8 06/19/2018 09:35 AM     Lab Results   Component Value Date/Time    INR 1.03 07/26/2012 12:40 PM     Lab Results   Component Value Date/Time    CHOLSTRLTOT 150 06/19/2018 09:35 AM    LDL 68 06/19/2018 09:35 AM    HDL 67 06/19/2018 09:35 AM    TRIGLYCERIDE 75 06/19/2018 09:35 AM       Lab Results   Component Value Date/Time    TESTOSTERONE 34 (H) 06/03/2015 07:43 AM     No results found for: TSH  Lab Results   Component Value Date/Time    FREET4 0.88 06/19/2018 09:35 AM    FREET4 0.98 02/12/2018 02:17 PM     No results found for: URICACID  No components found for: VITB12  Lab Results   Component Value  Date/Time    25HYDROXY 57 06/19/2018 09:35 AM    25HYDROXY 60 10/03/2017 08:34 AM          Assessment/Plan:     1. Acute bronchitis, unspecified organism        - amoxicillin-clavulanate (AUGMENTIN) 875-125 MG Tab; Take 1 Tab by mouth 2 times a day.  Dispense: 20 Tab; Refill: 0  - DX-CHEST-2 VIEWS; Future  - POCT Influenza A/B- neg

## 2018-11-13 ENCOUNTER — APPOINTMENT (OUTPATIENT)
Dept: MEDICAL GROUP | Age: 78
End: 2018-11-13
Payer: MEDICARE

## 2018-11-15 ENCOUNTER — OFFICE VISIT (OUTPATIENT)
Dept: MEDICAL GROUP | Facility: LAB | Age: 78
End: 2018-11-15
Payer: MEDICARE

## 2018-11-15 VITALS
OXYGEN SATURATION: 92 % | TEMPERATURE: 97.4 F | SYSTOLIC BLOOD PRESSURE: 146 MMHG | HEIGHT: 64 IN | HEART RATE: 80 BPM | DIASTOLIC BLOOD PRESSURE: 82 MMHG | RESPIRATION RATE: 16 BRPM | WEIGHT: 160 LBS | BODY MASS INDEX: 27.31 KG/M2

## 2018-11-15 DIAGNOSIS — J40 BRONCHITIS: ICD-10-CM

## 2018-11-15 DIAGNOSIS — J43.8 OTHER EMPHYSEMA (HCC): ICD-10-CM

## 2018-11-15 DIAGNOSIS — J98.01 BRONCHOSPASM: ICD-10-CM

## 2018-11-15 PROCEDURE — 99214 OFFICE O/P EST MOD 30 MIN: CPT | Mod: 25 | Performed by: FAMILY MEDICINE

## 2018-11-15 PROCEDURE — 94640 AIRWAY INHALATION TREATMENT: CPT | Performed by: FAMILY MEDICINE

## 2018-11-15 RX ORDER — ALBUTEROL SULFATE 2.5 MG/3ML
2.5 SOLUTION RESPIRATORY (INHALATION) ONCE
Status: COMPLETED | OUTPATIENT
Start: 2018-11-15 | End: 2018-11-15

## 2018-11-15 RX ORDER — ALBUTEROL SULFATE 90 UG/1
2 AEROSOL, METERED RESPIRATORY (INHALATION) EVERY 6 HOURS PRN
Qty: 8.5 G | Refills: 1 | Status: SHIPPED | OUTPATIENT
Start: 2018-11-15 | End: 2022-10-12

## 2018-11-15 RX ORDER — PREDNISONE 20 MG/1
60 TABLET ORAL DAILY
Qty: 15 TAB | Refills: 0 | Status: SHIPPED | OUTPATIENT
Start: 2018-11-15 | End: 2018-11-20

## 2018-11-15 RX ORDER — AZITHROMYCIN 250 MG/1
TABLET, FILM COATED ORAL
Qty: 6 TAB | Refills: 0 | Status: SHIPPED | OUTPATIENT
Start: 2018-11-15 | End: 2018-11-20

## 2018-11-15 RX ADMIN — ALBUTEROL SULFATE 2.5 MG: 2.5 SOLUTION RESPIRATORY (INHALATION) at 18:07

## 2018-11-16 NOTE — PATIENT INSTRUCTIONS
Bronchospasm, Adult  A bronchospasm is a spasm or tightening of the airways going into the lungs. During a bronchospasm breathing becomes more difficult because the airways get smaller. When this happens there can be coughing, a whistling sound when breathing (wheezing), and difficulty breathing. Bronchospasm is often associated with asthma, but not all patients who experience a bronchospasm have asthma.  What are the causes?  A bronchospasm is caused by inflammation or irritation of the airways. The inflammation or irritation may be triggered by:  · Allergies (such as to animals, pollen, food, or mold). Allergens that cause bronchospasm may cause wheezing immediately after exposure or many hours later.  · Infection. Viral infections are believed to be the most common cause of bronchospasm.  · Exercise.  · Irritants (such as pollution, cigarette smoke, strong odors, aerosol sprays, and paint fumes).  · Weather changes. Winds increase molds and pollens in the air. Rain refreshes the air by washing irritants out. Cold air may cause inflammation.  · Stress and emotional upset.  What are the signs or symptoms?  · Wheezing.  · Excessive nighttime coughing.  · Frequent or severe coughing with a simple cold.  · Chest tightness.  · Shortness of breath.  How is this diagnosed?  Bronchospasm is usually diagnosed through a history and physical exam. Tests, such as chest X-rays, are sometimes done to look for other conditions.  How is this treated?  · Inhaled medicines can be given to open up your airways and help you breathe. The medicines can be given using either an inhaler or a nebulizer machine.  · Corticosteroid medicines may be given for severe bronchospasm, usually when it is associated with asthma.  Follow these instructions at home:  · Always have a plan prepared for seeking medical care. Know when to call your health care provider and local emergency services (911 in the [place] Know where you can access local  emergency care.  · Only take medicines as directed by your health care provider.  · If you were prescribed an inhaler or nebulizer machine, ask your health care provider to explain how to use it correctly. Always use a spacer with your inhaler if you were given one.  · It is necessary to remain calm during an attack. Try to relax and breathe more slowly.  · Control your home environment in the following ways:  ¨ Change your heating and air conditioning filter at least once a month.  ¨ Limit your use of fireplaces and wood stoves.  ¨ Do not smoke and do not allow smoking in your home.  ¨ Avoid exposure to perfumes and fragrances.  ¨ Get rid of pests (such as roaches and mice) and their droppings.  ¨ Throw away plants if you see mold on them.  ¨ Keep your house clean and dust free.  ¨ Replace carpet with wood, tile, or vinyl linsey. Carpet can trap dander and dust.  ¨ Use allergy-proof pillows, mattress covers, and box spring covers.  ¨ Wash bed sheets and blankets every week in hot water and dry them in a dryer.  ¨ Use blankets that are made of polyester or cotton.  ¨ Wash hands frequently.  Contact a health care provider if:  · You have muscle aches.  · You have chest pain.  · The sputum changes from clear or white to yellow, green, gray, or bloody.  · The sputum you cough up gets thicker.  · There are problems that may be related to the medicine you are given, such as a rash, itching, swelling, or trouble breathing.  Get help right away if:  · You have worsening wheezing and coughing even after taking your prescribed medicines.  · You have increased difficulty breathing.  · You develop severe chest pain.  This information is not intended to replace advice given to you by your health care provider. Make sure you discuss any questions you have with your health care provider.  Document Released: 12/20/2004 Document Revised: 05/25/2017 Document Reviewed: 06/09/2014  [employer] Interactive Patient Education © 2017  [employer]  Bronchitis  Bronchitis is the body's way of reacting to injury and/or infection (inflammation) of the bronchi. Bronchi are the air tubes that extend from the windpipe into the lungs. If the inflammation becomes severe, it may cause shortness of breath.  CAUSES   Inflammation may be caused by:  · A virus.  · Germs (bacteria).  · Dust.  · Allergens.  · Pollutants and many other irritants.  The cells lining the bronchial tree are covered with tiny hairs (cilia). These constantly beat upward, away from the lungs, toward the mouth. This keeps the lungs free of pollutants. When these cells become too irritated and are unable to do their job, mucus begins to develop. This causes the characteristic cough of bronchitis. The cough clears the lungs when the cilia are unable to do their job. Without either of these protective mechanisms, the mucus would settle in the lungs. Then you would develop pneumonia.  Smoking is a common cause of bronchitis and can contribute to pneumonia. Stopping this habit is the single most important thing you can do to help yourself.  TREATMENT   · Your caregiver may prescribe an antibiotic if the cough is caused by bacteria. Also, medicines that open up your airways make it easier to breathe. Your caregiver may also recommend or prescribe an expectorant. It will loosen the mucus to be coughed up. Only take over-the-counter or prescription medicines for pain, discomfort, or fever as directed by your caregiver.  · Removing whatever causes the problem (smoking, for example) is critical to preventing the problem from getting worse.  · Cough suppressants may be prescribed for relief of cough symptoms.  · Inhaled medicines may be prescribed to help with symptoms now and to help prevent problems from returning.  · For those with recurrent (chronic) bronchitis, there may be a need for steroid medicines.  SEEK IMMEDIATE MEDICAL CARE IF:   · During treatment, you develop more pus-like mucus  (purulent sputum).  · You have a fever.  · Your baby is older than 3 months with a rectal temperature of 102° F (38.9° C) or higher.  · Your baby is 3 months old or younger with a rectal temperature of 100.4° F (38° C) or higher.  · You become progressively more ill.  · You have increased difficulty breathing, wheezing, or shortness of breath.  It is necessary to seek immediate medical care if you are elderly or sick from any other disease.  MAKE SURE YOU:   · Understand these instructions.  · Will watch your condition.  · Will get help right away if you are not doing well or get worse.  Document Released: 12/18/2006 Document Revised: 03/11/2013 Document Reviewed: 10/27/2009  [employer]® Patient Information ©2014 [employer].

## 2018-11-16 NOTE — PROGRESS NOTES
Subjective:     Chief Complaint   Patient presents with   • Bronchitis     possible bronchitis relapse       Norah Mchugh is a 78 y.o. female here today for evaluation and management of:    Bronchitis  Illness: 13 days of illness including: cough, clear sputum until today she had some green sputum   Symptoms negative for sore throat or sinus pressure  Treatments tried: none - she didn't take the Augmentin  Since onset, symptoms are worse - waxes and wanes  Similarly ill exposures: no  Medical history negative for asthma  She  reports that she quit smoking about 46 years ago. Her smoking use included Cigarettes. She has a 40.00 pack-year smoking history. She has never used smokeless tobacco.         Allergies   Allergen Reactions   • Milk Protein Nausea   • Other Drug Nausea     An antibiotic, name unknown       Current medicines (including changes today)  Current Outpatient Prescriptions   Medication Sig Dispense Refill   • azithromycin (ZITHROMAX) 250 MG Tab 2 tabs by mouth day 1, 1 tab by mouth days 2-5 6 Tab 0   • predniSONE (DELTASONE) 20 MG Tab Take 3 Tabs by mouth every day for 5 days. 15 Tab 0   • albuterol 108 (90 Base) MCG/ACT Aero Soln inhalation aerosol Inhale 2 Puffs by mouth every 6 hours as needed for Shortness of Breath. 8.5 g 1   • OIL OF OREGANO PO Take  by mouth.     • cyanocobalamin (VITAMIN B-12) 100 MCG Tab Take 100 mcg by mouth every day.     • NATURE-THROID 65 MG tablet Take 65 mg by mouth.  3   • Cholecalciferol (VITAMIN D3) 5000 UNIT TABS Take  by mouth every day.       Current Facility-Administered Medications   Medication Dose Route Frequency Provider Last Rate Last Dose   • albuterol (PROVENTIL) 2.5mg/3ml nebulizer solution 2.5 mg  2.5 mg Nebulization Once Jovanna Iglesias M.D.           She  has a past medical history of CATARACT; Glaucoma; Hypertension; Pain (05-18-12); Polio; and Unspecified disorder of thyroid.    Patient Active Problem List    Diagnosis Date Noted   •  "Bronchitis 11/15/2018   • Bronchospasm 11/15/2018   • Former smoker 09/12/2018   • Other emphysema (HCC) 09/12/2018   • Age-related osteoporosis without current pathological fracture 09/12/2018   • Presbycusis of both ears 09/12/2018   • Acquired hypothyroidism 06/14/2018   • Agatston coronary artery calcium score 119, pred LAD 08/28/2014   • Poliomyelitis (historic) 08/28/2014   • Osteoarthritis of hip, left 07/26/2012       ROS   No fever or chills.  No nausea or vomiting.  No chest pain or palpitations.    No pain with urination or hematuria.  No black or bloody stools.       Objective:     Blood pressure 146/82, pulse 80, temperature 36.3 °C (97.4 °F), temperature source Temporal, resp. rate 16, height 1.626 m (5' 4\"), weight 72.6 kg (160 lb), SpO2 92 %, not currently breastfeeding. Body mass index is 27.46 kg/m².   Physical Exam:  Well developed, well nourished.  Alert, oriented in no acute distress.  Eye contact is good, speech goal directed, affect calm  Eyes: conjunctiva non-injected, sclera non-icteric.  Ears: Pinna normal. TM pearly gray.   Nose: Nares are patent.  Erythematous, swollen mucosa without discharge  Mouth: Oral mucous membranes pink and moist with no lesions.  Mild diffuse erythema of the posterior pharynx without exudate  Neck Supple.  No adenopathy or masses in the neck or supraclavicular regions. No thyromegaly  Lungs: Expiratory wheezes throughout with rhonchi diffusely.  Poor air movement.  This did improve with albuterol nebulizer treatment.  No crackles heard  CV: regular rate and rhythm. No murmur            Assessment and Plan:   The following treatment plan was discussed    1. Bronchitis  Zithromax as directed.  Discussed that she should go to the emergency room if her symptoms worsen.  Follow-up with PCP as needed  - azithromycin (ZITHROMAX) 250 MG Tab; 2 tabs by mouth day 1, 1 tab by mouth days 2-5  Dispense: 6 Tab; Refill: 0    2. Bronchospasm  Patient had hypoxia prior to " treatment.  This resolved with albuterol.  Prednisone 60 mg daily for 5 days.  Albuterol as directed  - albuterol (PROVENTIL) 2.5mg/3ml nebulizer solution 2.5 mg; 3 mL by Nebulization route Once.  - predniSONE (DELTASONE) 20 MG Tab; Take 3 Tabs by mouth every day for 5 days.  Dispense: 15 Tab; Refill: 0  - albuterol 108 (90 Base) MCG/ACT Aero Soln inhalation aerosol; Inhale 2 Puffs by mouth every 6 hours as needed for Shortness of Breath.  Dispense: 8.5 g; Refill: 1    3. Other emphysema (HCC)  See #3    Any change or worsening of signs or symptoms, patient encouraged to follow-up or report to the emergency room for further evaluation. Patient understands and agrees.    Followup: Return if symptoms worsen or fail to improve.

## 2018-11-16 NOTE — ASSESSMENT & PLAN NOTE
Illness: 13 days of illness including: cough, clear sputum until today she had some green sputum   Symptoms negative for sore throat or sinus pressure  Treatments tried: none - she didn't take the Augmentin  Since onset, symptoms are worse - waxes and wanes  Similarly ill exposures: no  Medical history negative for asthma  She  reports that she quit smoking about 46 years ago. Her smoking use included Cigarettes. She has a 40.00 pack-year smoking history. She has never used smokeless tobacco.

## 2018-12-20 ENCOUNTER — OFFICE VISIT (OUTPATIENT)
Dept: MEDICAL GROUP | Age: 78
End: 2018-12-20
Payer: MEDICARE

## 2018-12-20 VITALS — DIASTOLIC BLOOD PRESSURE: 80 MMHG | SYSTOLIC BLOOD PRESSURE: 128 MMHG

## 2018-12-20 DIAGNOSIS — H61.23 BILATERAL IMPACTED CERUMEN: ICD-10-CM

## 2018-12-20 PROBLEM — J40 BRONCHITIS: Status: RESOLVED | Noted: 2018-11-15 | Resolved: 2018-12-20

## 2018-12-20 PROBLEM — J98.01 BRONCHOSPASM: Status: RESOLVED | Noted: 2018-11-15 | Resolved: 2018-12-20

## 2018-12-20 PROCEDURE — 99213 OFFICE O/P EST LOW 20 MIN: CPT | Mod: 25 | Performed by: FAMILY MEDICINE

## 2018-12-20 PROCEDURE — 69210 REMOVE IMPACTED EAR WAX UNI: CPT | Performed by: FAMILY MEDICINE

## 2018-12-20 NOTE — PROGRESS NOTES
Subjective:   CC: ear fullness, hx of cerumen impaction    HPI:     Norah Mchugh is a 78 y.o. female who is here as an established patient.    Patient reports wax build up in both of her ears. She states it is worse in her right ear. She notes this started when she began wearing hearing aids. She had left ear lavage 4 weeks ago.     She states she does not need medication refills. She is up to date on vaccinations.      Current medicines (including changes today)  Current Outpatient Prescriptions   Medication Sig Dispense Refill   • carbamide peroxide (CARBAMOXIDE EAR DROPS) 6.5 % Solution Place 6 Drops in ear 2 times a day. Administer drops in both ears. 1 Bottle 1   • albuterol 108 (90 Base) MCG/ACT Aero Soln inhalation aerosol Inhale 2 Puffs by mouth every 6 hours as needed for Shortness of Breath. 8.5 g 1   • OIL OF OREGANO PO Take  by mouth.     • cyanocobalamin (VITAMIN B-12) 100 MCG Tab Take 100 mcg by mouth every day.     • NATURE-THROID 65 MG tablet Take 65 mg by mouth.  3   • Cholecalciferol (VITAMIN D3) 5000 UNIT TABS Take  by mouth every day.       No current facility-administered medications for this visit.      She  has a past medical history of CATARACT; Glaucoma; Hypertension; Pain (05-18-12); Polio; and Unspecified disorder of thyroid.    I personally reviewed patient's problem list, allergies, medications, family hx, social hx with patient and update EPIC.     REVIEW OF SYSTEMS:  CONSTITUTIONAL:  Denies night sweats, fatigue, malaise, lethargy, fever or chills.  RESPIRATORY:  Denies cough, wheeze, hemoptysis, or shortness of breath.  CARDIOVASCULAR:  Denies chest pains, palpitations, pedal edema     Objective:     Blood pressure 128/80, not currently breastfeeding. There is no height or weight on file to calculate BMI.    Physical Exam:  Constitutional: awake, alert, in no distress.  Skin: Warm, dry, good turgor, no rashes, bruises, ulcers in visible areas.  Eye: conjunctiva clear,  lids neg for edema or lesions.  ENMT: mild-moderate cerumen impaction. Oral and nasal mucosa wnl. Lips without lesions, good dentition, oropharynx clear.  Neck: Trachea midline, no masses, no thyromegaly. No cervical or supraclavicular lymphadenopathy  Respiratory: Unlabored respiratory effort, lungs clear to auscultation, no wheezes, no rales.  Cardiovascular: Normal S1, S2, no murmur, no pedal edema.  Psych: Oriented x3, affect and mood wnl, intact judgement and insight.       Assessment and Plan:   The following treatment plan was discussed    1. Bilateral impacted cerumen  Exam was notable for cerumen impaction, right worse than left. Plans:   - ear lavage, see procedure note below  - carbamide peroxide (CARBAMOXIDE EAR DROPS) 6.5 % Solution; Place 6 Drops in ear 2 times a day. Administer drops in both ears.  Dispense: 1 Bottle; Refill: 1    Procedure note: ear wax removal.   Right ear is partially irrigated by MA. I personally removed the rest of ear wax using a lighted curette pt tolerated the procedure well, no immediate complication noted.     Myriam Manzanares M.D.    Followup: Return for As needed.    Please note that this dictation was created using voice recognition software and/or scribes. I have made every reasonable attempt to correct obvious errors, but I expect that there are errors of grammar and possibly content that I did not discover before finalizing the note.     Ryley CARDENAS ([place]), am scribing for, and in the presence of, Myriam Manzanares M.D.    Electronically signed by: Ryley Shah ([place]), 12/20/2018    Myriam CARDENAS M.D. personally performed the services described in this documentation, as scribed by Ryley Shah in my presence, and it is both accurate and complete.

## 2019-02-12 ENCOUNTER — HOSPITAL ENCOUNTER (OUTPATIENT)
Dept: LAB | Facility: MEDICAL CENTER | Age: 79
End: 2019-02-12
Attending: FAMILY MEDICINE
Payer: MEDICARE

## 2019-02-12 LAB
ALBUMIN SERPL BCP-MCNC: 4.3 G/DL (ref 3.2–4.9)
ALBUMIN/GLOB SERPL: 1.7 G/DL
ALP SERPL-CCNC: 76 U/L (ref 30–99)
ALT SERPL-CCNC: 10 U/L (ref 2–50)
ANION GAP SERPL CALC-SCNC: 8 MMOL/L (ref 0–11.9)
AST SERPL-CCNC: 15 U/L (ref 12–45)
BASOPHILS # BLD AUTO: 0.9 % (ref 0–1.8)
BASOPHILS # BLD: 0.05 K/UL (ref 0–0.12)
BILIRUB SERPL-MCNC: 0.8 MG/DL (ref 0.1–1.5)
BUN SERPL-MCNC: 9 MG/DL (ref 8–22)
CALCIUM SERPL-MCNC: 9.8 MG/DL (ref 8.5–10.5)
CHLORIDE SERPL-SCNC: 100 MMOL/L (ref 96–112)
CHOLEST SERPL-MCNC: 161 MG/DL (ref 100–199)
CO2 SERPL-SCNC: 29 MMOL/L (ref 20–33)
CREAT SERPL-MCNC: 0.63 MG/DL (ref 0.5–1.4)
EOSINOPHIL # BLD AUTO: 0.18 K/UL (ref 0–0.51)
EOSINOPHIL NFR BLD: 3.4 % (ref 0–6.9)
ERYTHROCYTE [DISTWIDTH] IN BLOOD BY AUTOMATED COUNT: 49.1 FL (ref 35.9–50)
FASTING STATUS PATIENT QL REPORTED: NORMAL
GLOBULIN SER CALC-MCNC: 2.5 G/DL (ref 1.9–3.5)
GLUCOSE SERPL-MCNC: 94 MG/DL (ref 65–99)
HCT VFR BLD AUTO: 51.6 % (ref 37–47)
HDLC SERPL-MCNC: 66 MG/DL
HGB BLD-MCNC: 16.8 G/DL (ref 12–16)
IMM GRANULOCYTES # BLD AUTO: 0 K/UL (ref 0–0.11)
IMM GRANULOCYTES NFR BLD AUTO: 0 % (ref 0–0.9)
LDLC SERPL CALC-MCNC: 74 MG/DL
LYMPHOCYTES # BLD AUTO: 1.49 K/UL (ref 1–4.8)
LYMPHOCYTES NFR BLD: 28.2 % (ref 22–41)
MCH RBC QN AUTO: 31.6 PG (ref 27–33)
MCHC RBC AUTO-ENTMCNC: 32.6 G/DL (ref 33.6–35)
MCV RBC AUTO: 97.2 FL (ref 81.4–97.8)
MONOCYTES # BLD AUTO: 0.34 K/UL (ref 0–0.85)
MONOCYTES NFR BLD AUTO: 6.4 % (ref 0–13.4)
NEUTROPHILS # BLD AUTO: 3.23 K/UL (ref 2–7.15)
NEUTROPHILS NFR BLD: 61.1 % (ref 44–72)
NRBC # BLD AUTO: 0 K/UL
NRBC BLD-RTO: 0 /100 WBC
PLATELET # BLD AUTO: 216 K/UL (ref 164–446)
PMV BLD AUTO: 11 FL (ref 9–12.9)
POTASSIUM SERPL-SCNC: 4.1 MMOL/L (ref 3.6–5.5)
PROT SERPL-MCNC: 6.8 G/DL (ref 6–8.2)
RBC # BLD AUTO: 5.31 M/UL (ref 4.2–5.4)
SODIUM SERPL-SCNC: 137 MMOL/L (ref 135–145)
T4 FREE SERPL-MCNC: 0.75 NG/DL (ref 0.53–1.43)
TRIGL SERPL-MCNC: 104 MG/DL (ref 0–149)
TSH SERPL DL<=0.005 MIU/L-ACNC: 1.19 UIU/ML (ref 0.38–5.33)
WBC # BLD AUTO: 5.3 K/UL (ref 4.8–10.8)

## 2019-02-12 PROCEDURE — 80061 LIPID PANEL: CPT

## 2019-02-12 PROCEDURE — 36415 COLL VENOUS BLD VENIPUNCTURE: CPT

## 2019-02-12 PROCEDURE — 84443 ASSAY THYROID STIM HORMONE: CPT

## 2019-02-12 PROCEDURE — 80053 COMPREHEN METABOLIC PANEL: CPT

## 2019-02-12 PROCEDURE — 84439 ASSAY OF FREE THYROXINE: CPT

## 2019-02-12 PROCEDURE — 85025 COMPLETE CBC W/AUTO DIFF WBC: CPT

## 2019-04-22 ENCOUNTER — PATIENT OUTREACH (OUTPATIENT)
Dept: HEALTH INFORMATION MANAGEMENT | Facility: OTHER | Age: 79
End: 2019-04-22

## 2019-04-22 NOTE — PROGRESS NOTES
1. [facility] Verified: yes    2. Verify PCP: yes    3. Review and add  to Care Team: yes    No able to verify more info. Pt was in a rush.

## 2019-04-22 NOTE — PROGRESS NOTES
Outcome: Left voicemail/ message    Please transfer to [facility]  [phone] when patient returns call.     WebIZ Checked & Epic Updated:  yes     [facility] Verified: yes     Attempt # 1

## 2020-12-08 ENCOUNTER — HOSPITAL ENCOUNTER (OUTPATIENT)
Dept: LAB | Facility: MEDICAL CENTER | Age: 80
End: 2020-12-08
Attending: FAMILY MEDICINE
Payer: MEDICARE

## 2020-12-08 LAB
ALBUMIN SERPL BCP-MCNC: 3.8 G/DL (ref 3.2–4.9)
ALBUMIN/GLOB SERPL: 1.4 G/DL
ALP SERPL-CCNC: 96 U/L (ref 30–99)
ALT SERPL-CCNC: 10 U/L (ref 2–50)
ANION GAP SERPL CALC-SCNC: 15 MMOL/L (ref 7–16)
AST SERPL-CCNC: 13 U/L (ref 12–45)
BILIRUB SERPL-MCNC: 0.6 MG/DL (ref 0.1–1.5)
BUN SERPL-MCNC: 12 MG/DL (ref 8–22)
CALCIUM SERPL-MCNC: 9.4 MG/DL (ref 8.4–10.2)
CHLORIDE SERPL-SCNC: 101 MMOL/L (ref 96–112)
CHOLEST SERPL-MCNC: 181 MG/DL (ref 100–199)
CO2 SERPL-SCNC: 27 MMOL/L (ref 20–33)
CREAT SERPL-MCNC: 0.55 MG/DL (ref 0.5–1.4)
FASTING STATUS PATIENT QL REPORTED: NORMAL
GLOBULIN SER CALC-MCNC: 2.8 G/DL (ref 1.9–3.5)
GLUCOSE SERPL-MCNC: 107 MG/DL (ref 65–99)
HDLC SERPL-MCNC: 55 MG/DL
LDLC SERPL CALC-MCNC: 95 MG/DL
POTASSIUM SERPL-SCNC: 4.1 MMOL/L (ref 3.6–5.5)
PROT SERPL-MCNC: 6.6 G/DL (ref 6–8.2)
SODIUM SERPL-SCNC: 143 MMOL/L (ref 135–145)
T4 FREE SERPL-MCNC: 1.04 NG/DL (ref 0.93–1.7)
TRIGL SERPL-MCNC: 157 MG/DL (ref 0–149)
TSH SERPL DL<=0.005 MIU/L-ACNC: 0.26 UIU/ML (ref 0.38–5.33)

## 2020-12-08 PROCEDURE — 80053 COMPREHEN METABOLIC PANEL: CPT

## 2020-12-08 PROCEDURE — 36415 COLL VENOUS BLD VENIPUNCTURE: CPT

## 2020-12-08 PROCEDURE — 80061 LIPID PANEL: CPT

## 2020-12-08 PROCEDURE — 84443 ASSAY THYROID STIM HORMONE: CPT

## 2020-12-08 PROCEDURE — 84439 ASSAY OF FREE THYROXINE: CPT

## 2021-01-09 ENCOUNTER — OFFICE VISIT (OUTPATIENT)
Dept: URGENT CARE | Facility: CLINIC | Age: 81
End: 2021-01-09
Payer: MEDICARE

## 2021-01-09 VITALS
BODY MASS INDEX: 27.31 KG/M2 | WEIGHT: 160 LBS | DIASTOLIC BLOOD PRESSURE: 72 MMHG | HEIGHT: 64 IN | HEART RATE: 83 BPM | TEMPERATURE: 98 F | RESPIRATION RATE: 16 BRPM | OXYGEN SATURATION: 96 % | SYSTOLIC BLOOD PRESSURE: 128 MMHG

## 2021-01-09 DIAGNOSIS — S61.412A SKIN TEAR OF HAND WITHOUT COMPLICATION, LEFT, INITIAL ENCOUNTER: ICD-10-CM

## 2021-01-09 DIAGNOSIS — L03.114 CELLULITIS OF LEFT HAND: ICD-10-CM

## 2021-01-09 PROCEDURE — 12002 RPR S/N/AX/GEN/TRNK2.6-7.5CM: CPT | Performed by: NURSE PRACTITIONER

## 2021-01-09 RX ORDER — CEFDINIR 300 MG/1
300 CAPSULE ORAL 2 TIMES DAILY
Qty: 14 CAP | Refills: 0 | Status: SHIPPED | OUTPATIENT
Start: 2021-01-09 | End: 2021-01-16

## 2021-01-09 RX ORDER — THYROID 60 MG
60 TABLET ORAL
COMMUNITY
Start: 2020-10-15 | End: 2023-05-31

## 2021-01-09 ASSESSMENT — ENCOUNTER SYMPTOMS
HEADACHES: 0
NAUSEA: 0
FEVER: 0
MYALGIAS: 0
CHILLS: 0
VOMITING: 0
TINGLING: 0

## 2021-01-09 ASSESSMENT — FIBROSIS 4 INDEX: FIB4 SCORE: 1.541610359332084924

## 2021-01-09 NOTE — PATIENT INSTRUCTIONS
Skin Tear  A skin tear is a wound in which the top layers of skin have peeled off from the deeper skin or tissues underneath. This is a common problem as people get older because the skin becomes thinner and more fragile. In addition, some medicines, such as oral corticosteroids, can lead to thinning skin if they are taken for long periods of time.  A skin tear is often repaired with tape or skin adhesive strips. Depending on the location of the wound, a bandage (dressing) may be applied over the tape or skin adhesive strips.  Follow these instructions at home:  Wound care    · Clean the wound as told by your health care provider. You may be instructed to keep the wound dry for the first few days. If you are told to clean the wound:  ? Wash the wound as told by your health care provider. This may include using mild soap and water, a wound cleanser, or a salt-water (saline) solution.  ? If using soap, rinse the wound with water to remove all soap.  ? Do not rub the wound dry. Pat it gently or let it air dry.  ? Keep the dressing dry as told by your health care provider.  · Change any dressings as told by your health care provider. This may include changing the dressing if it gets wet, gets dirty, or starts to smell bad.  ? Wash your hands with soap and water before and after you change your bandage (dressing). If soap and water are not available, use hand .  ? Leave tape or skin adhesive strips in place. These skin closures may need to stay in place for 2 weeks or longer. If adhesive strip edges start to loosen and curl up, you may trim the loose edges. Do not remove adhesive strips completely unless your health care provider tells you to do that.  · Check your wound every day for signs of infection. Check for:  ? Redness, swelling, or pain.  ? More fluid or blood.  ? Warmth.  ? Pus or a bad smell.  · Do not scratch or pick at the wound.  · Protect the injured area until it has healed.  Medicines  · Take or  apply over-the-counter and prescription medicines only as told by your health care provider.  · If you were prescribed an antibiotic medicine, take or apply it as told by your health care provider. Do not stop using the antibiotic even if your condition improves.  General instructions    · Keep the dressing dry as told by your health care provider.  · Do not take baths, swim, use a hot tub, or do anything that puts your wound underwater until your health care provider approves. Ask your health care provider if you may take showers. You may only be allowed to take sponge baths.  · Keep all follow-up visits as told by your health care provider. This is important.  Contact a health care provider if:  · You have redness, swelling, or pain around your wound.  · You have more fluid or blood coming from your wound.  · Your wound, or the area around your wound, feels warm to the touch.  · You have pus or a bad smell coming from your wound.  Get help right away if:  · You have a red streak that goes away from the skin tear.  · You have a fever and chills and your symptoms suddenly get worse.  Summary  · A skin tear is a wound in which the top layers of skin have peeled off from the deeper skin or tissues underneath.  · A skin tear is often repaired with tape or skin adhesive strips, and a bandage (dressing) may be applied over the tape or skin adhesive strips.  · Change any dressings as told by your health care provider.  · Take or apply over-the-counter and prescription medicines only as told by your health care provider.  · Contact a health care provider if you have signs of infection.  This information is not intended to replace advice given to you by your health care provider. Make sure you discuss any questions you have with your health care provider.  Document Released: 09/12/2002 Document Revised: 10/08/2019 Document Reviewed: 10/08/2019  Elsevier Patient Education © 2020 [employer]

## 2021-01-09 NOTE — PROGRESS NOTES
Subjective:     Norah Mchugh is a 80 y.o. female who presents for Laceration (L Hand, x 4 days)      HPI  Pt presents for evaluation of a new problem.  Norah is a pleasant 80-year-old female presents to urgent care today with her  with complaints of a left-sided hand laceration.  4 days ago she was at her chiropractor's office who was trying to warm up her hands.  The chiropractor told her skin down with massage and her skin split wide open.  She states that her chiropractor placed a butterfly bandage to keep the skin intact but this was creating pain so she took the bandage off.  Since this time her skin tear has been open and at night the scab has been ripping off.  She denies any pain.  There is redness surrounding her skin tear but she believes that this is due to bandages that she has been using at home.  Negative for any drainage, fever/chills.    Review of Systems   Constitutional: Negative for chills, fever and malaise/fatigue.   Gastrointestinal: Negative for nausea and vomiting.   Musculoskeletal: Negative for myalgias.   Skin: Positive for rash. Negative for itching.   Neurological: Negative for tingling and headaches.       PMH:   Past Medical History:   Diagnosis Date   • CATARACT    • Glaucoma     left   • Hypertension    • Pain 05-18-12    left hip, 2 w/movement   • Polio    • Unspecified disorder of thyroid     hypo     ALLERGIES:   Allergies   Allergen Reactions   • Milk Protein Nausea   • Other Drug Nausea     An antibiotic, name unknown     SURGHX:   Past Surgical History:   Procedure Laterality Date   • CATARACT PHACO WITH IOL  2/26/2013    Performed by David Bay M.D. at SURGERY SAME DAY [facility]   • INCISION AND DRAINAGE ORTHOPEDIC  8/30/2012    Performed by GHULAM BETTS at SURGERY [facility]   • HIP ARTHROPLASTY TOTAL  7/26/2012    Performed by Ghulam Betts M.D. at SURGERY [facility]   • JOINT INJECTION DIAGNOSTIC  5/24/2012    Performed by  TANIA BETTS at [facility]   • OTHER ORTHOPEDIC SURGERY      left hip fx (repair)   • OTHER ORTHOPEDIC SURGERY  2002    R foot    • BUNIONECTOMY      right, has to wear shoe when up   • OTHER      macular hole repair     SOCHX:   Social History     Socioeconomic History   • Marital status:      Spouse name: Not on file   • Number of children: Not on file   • Years of education: Not on file   • Highest education level: Not on file   Occupational History   • Not on file   Social Needs   • Financial resource strain: Not on file   • Food insecurity     Worry: Not on file     Inability: Not on file   • Transportation needs     Medical: Not on file     Non-medical: Not on file   Tobacco Use   • Smoking status: Former Smoker     Packs/day: 2.00     Years: 20.00     Pack years: 40.00     Types: Cigarettes     Quit date: 1972     Years since quittin.0   • Smokeless tobacco: Never Used   Substance and Sexual Activity   • Alcohol use: Not Currently     Alcohol/week: 8.4 oz     Types: 14 Glasses of wine per week     Comment: 2 glasses of red wine/night    • Drug use: No   • Sexual activity: Yes     Partners: Male     Birth control/protection: Post-Menopausal   Lifestyle   • Physical activity     Days per week: Not on file     Minutes per session: Not on file   • Stress: Not on file   Relationships   • Social connections     Talks on phone: Not on file     Gets together: Not on file     Attends [employer] service: Not on file     Active member of club or organization: Not on file     Attends meetings of clubs or organizations: Not on file     Relationship status: Not on file   • Intimate partner violence     Fear of current or ex partner: Not on file     Emotionally abused: Not on file     Physically abused: Not on file     Forced sexual activity: Not on file   Other Topics Concern   • Not on file   Social History Narrative   • Not on file     FH:   Family History   Problem Relation Age of  "Onset   • Heart Disease Mother    • Other Father         suicide   • Psychiatric Illness Father    • Cancer Sister         liver   • Psychiatric Illness Brother         depression   • Other Brother         suicide         Objective:   /72 (BP Location: Right arm, Patient Position: Sitting, BP Cuff Size: Adult)   Pulse 83   Temp 36.7 °C (98 °F) (Temporal)   Resp 16   Ht 1.626 m (5' 4\")   Wt 72.6 kg (160 lb)   SpO2 96%   BMI 27.46 kg/m²     Physical Exam  Vitals signs and nursing note reviewed.   Constitutional:       General: She is not in acute distress.     Appearance: Normal appearance. She is not ill-appearing.   HENT:      Head: Normocephalic and atraumatic.      Right Ear: External ear normal.      Left Ear: External ear normal.      Nose: No congestion or rhinorrhea.      Mouth/Throat:      Mouth: Mucous membranes are moist.   Eyes:      Extraocular Movements: Extraocular movements intact.      Pupils: Pupils are equal, round, and reactive to light.   Neck:      Musculoskeletal: Normal range of motion and neck supple.   Cardiovascular:      Rate and Rhythm: Normal rate and regular rhythm.      Pulses: Normal pulses.      Heart sounds: Normal heart sounds.   Pulmonary:      Effort: Pulmonary effort is normal.      Breath sounds: Normal breath sounds.   Abdominal:      General: Abdomen is flat. Bowel sounds are normal.      Palpations: Abdomen is soft.      Tenderness: There is no abdominal tenderness. There is no right CVA tenderness or left CVA tenderness.   Musculoskeletal: Normal range of motion.   Skin:     General: Skin is warm and dry.      Capillary Refill: Capillary refill takes less than 2 seconds.      Findings: Erythema and laceration present.      Comments: 6 cm skin tear to dorsal left hand.  Positive for erythema 1 inch surrounding wound.  Negative for drainage or pain to palpation.  Negative for swelling.  Dermabond applied to skin tear with good approximation of wound.  Steri-Strips " applied for reinforcement.   Neurological:      General: No focal deficit present.      Mental Status: She is alert and oriented to person, place, and time. Mental status is at baseline.   Psychiatric:         Mood and Affect: Mood normal.         Behavior: Behavior normal.         Thought Content: Thought content normal.         Judgment: Judgment normal.         Assessment/Plan:   Assessment      1. Skin tear of hand without complication, left, initial encounter     2. Cellulitis of left hand  mupirocin (BACTROBAN) 2 % Ointment    cefdinir (OMNICEF) 300 MG Cap   Patient encouraged to seek treatment back in urgent care for worsening signs/symptoms of infection.  Wound care instructions given on Dermabond and Steri-Strips.  She verbalizes understanding.  AVS handout given and reviewed with patient. Pt educated on red flags and when to seek treatment back in ER or UC.

## 2021-01-11 DIAGNOSIS — Z23 NEED FOR VACCINATION: ICD-10-CM

## 2021-06-29 ENCOUNTER — PATIENT MESSAGE (OUTPATIENT)
Dept: HEALTH INFORMATION MANAGEMENT | Facility: OTHER | Age: 81
End: 2021-06-29

## 2021-08-13 ENCOUNTER — HOSPITAL ENCOUNTER (OUTPATIENT)
Dept: LAB | Facility: MEDICAL CENTER | Age: 81
End: 2021-08-13
Attending: FAMILY MEDICINE
Payer: MEDICARE

## 2021-08-13 LAB
ALBUMIN SERPL BCP-MCNC: 3.7 G/DL (ref 3.2–4.9)
ALBUMIN/GLOB SERPL: 1.2 G/DL
ALP SERPL-CCNC: 95 U/L (ref 30–99)
ALT SERPL-CCNC: 7 U/L (ref 2–50)
ANION GAP SERPL CALC-SCNC: 12 MMOL/L (ref 7–16)
AST SERPL-CCNC: 10 U/L (ref 12–45)
BILIRUB SERPL-MCNC: 0.4 MG/DL (ref 0.1–1.5)
BUN SERPL-MCNC: 14 MG/DL (ref 8–22)
CALCIUM SERPL-MCNC: 9.5 MG/DL (ref 8.4–10.2)
CHLORIDE SERPL-SCNC: 98 MMOL/L (ref 96–112)
CHOLEST SERPL-MCNC: 155 MG/DL (ref 100–199)
CO2 SERPL-SCNC: 26 MMOL/L (ref 20–33)
CREAT SERPL-MCNC: 0.37 MG/DL (ref 0.5–1.4)
EST. AVERAGE GLUCOSE BLD GHB EST-MCNC: 103 MG/DL
FASTING STATUS PATIENT QL REPORTED: NORMAL
GLOBULIN SER CALC-MCNC: 3.1 G/DL (ref 1.9–3.5)
GLUCOSE SERPL-MCNC: 94 MG/DL (ref 65–99)
HBA1C MFR BLD: 5.2 % (ref 4–5.6)
HDLC SERPL-MCNC: 56 MG/DL
LDLC SERPL CALC-MCNC: 75 MG/DL
POTASSIUM SERPL-SCNC: 4.1 MMOL/L (ref 3.6–5.5)
PROT SERPL-MCNC: 6.8 G/DL (ref 6–8.2)
SODIUM SERPL-SCNC: 136 MMOL/L (ref 135–145)
T3FREE SERPL-MCNC: 3.13 PG/ML (ref 2–4.4)
T4 FREE SERPL-MCNC: 1.11 NG/DL (ref 0.93–1.7)
TRIGL SERPL-MCNC: 121 MG/DL (ref 0–149)
TSH SERPL DL<=0.005 MIU/L-ACNC: 0.32 UIU/ML (ref 0.38–5.33)

## 2021-08-13 PROCEDURE — 84439 ASSAY OF FREE THYROXINE: CPT

## 2021-08-13 PROCEDURE — 36415 COLL VENOUS BLD VENIPUNCTURE: CPT

## 2021-08-13 PROCEDURE — 80053 COMPREHEN METABOLIC PANEL: CPT

## 2021-08-13 PROCEDURE — 83036 HEMOGLOBIN GLYCOSYLATED A1C: CPT

## 2021-08-13 PROCEDURE — 84481 FREE ASSAY (FT-3): CPT

## 2021-08-13 PROCEDURE — 80061 LIPID PANEL: CPT

## 2021-08-13 PROCEDURE — 82172 ASSAY OF APOLIPOPROTEIN: CPT

## 2021-08-13 PROCEDURE — 84443 ASSAY THYROID STIM HORMONE: CPT

## 2021-08-15 LAB — APO B100 SERPL-MCNC: 72 MG/DL (ref 55–125)

## 2021-09-24 ENCOUNTER — APPOINTMENT (RX ONLY)
Dept: URBAN - METROPOLITAN AREA CLINIC 35 | Facility: CLINIC | Age: 81
Setting detail: DERMATOLOGY
End: 2021-09-24

## 2021-09-24 DIAGNOSIS — L81.4 OTHER MELANIN HYPERPIGMENTATION: ICD-10-CM

## 2021-09-24 DIAGNOSIS — L57.0 ACTINIC KERATOSIS: ICD-10-CM

## 2021-09-24 PROCEDURE — ? LIQUID NITROGEN

## 2021-09-24 PROCEDURE — ? COUNSELING

## 2021-09-24 PROCEDURE — 99202 OFFICE O/P NEW SF 15 MIN: CPT | Mod: 25

## 2021-09-24 PROCEDURE — 17000 DESTRUCT PREMALG LESION: CPT

## 2021-09-24 PROCEDURE — 17003 DESTRUCT PREMALG LES 2-14: CPT

## 2021-09-24 ASSESSMENT — LOCATION SIMPLE DESCRIPTION DERM
LOCATION SIMPLE: LEFT ZYGOMA
LOCATION SIMPLE: NOSE
LOCATION SIMPLE: LEFT TEMPLE
LOCATION SIMPLE: RIGHT TEMPLE

## 2021-09-24 ASSESSMENT — LOCATION DETAILED DESCRIPTION DERM
LOCATION DETAILED: LEFT CENTRAL TEMPLE
LOCATION DETAILED: LEFT CENTRAL ZYGOMA
LOCATION DETAILED: RIGHT MID TEMPLE
LOCATION DETAILED: NASAL DORSUM

## 2021-09-24 ASSESSMENT — LOCATION ZONE DERM
LOCATION ZONE: FACE
LOCATION ZONE: NOSE

## 2021-09-24 NOTE — PROCEDURE: LIQUID NITROGEN
Number Of Freeze-Thaw Cycles: 2 freeze-thaw cycles
Render Post-Care Instructions In Note?: no
Post-Care Instructions: I reviewed with the patient in detail post-care instructions. Patient is to wear sunprotection, and avoid picking at any of the treated lesions. Pt may apply Vaseline to crusted or scabbing areas.
Show Applicator Variable?: Yes
Duration Of Freeze Thaw-Cycle (Seconds): 0
Consent: The patient's consent was obtained including but not limited to risks of crusting, scabbing, blistering, scarring, darker or lighter pigmentary change, recurrence, incomplete removal and infection.
Detail Level: Detailed

## 2021-11-19 ENCOUNTER — APPOINTMENT (RX ONLY)
Dept: URBAN - METROPOLITAN AREA CLINIC 35 | Facility: CLINIC | Age: 81
Setting detail: DERMATOLOGY
End: 2021-11-19

## 2021-11-19 DIAGNOSIS — L57.0 ACTINIC KERATOSIS: ICD-10-CM

## 2021-11-19 PROCEDURE — ? LIQUID NITROGEN

## 2021-11-19 PROCEDURE — 17003 DESTRUCT PREMALG LES 2-14: CPT

## 2021-11-19 PROCEDURE — 17000 DESTRUCT PREMALG LESION: CPT

## 2021-11-19 PROCEDURE — ? COUNSELING

## 2021-11-19 ASSESSMENT — LOCATION DETAILED DESCRIPTION DERM
LOCATION DETAILED: LEFT SUPERIOR LATERAL MALAR CHEEK
LOCATION DETAILED: RIGHT MID TEMPLE

## 2021-11-19 ASSESSMENT — LOCATION SIMPLE DESCRIPTION DERM
LOCATION SIMPLE: LEFT CHEEK
LOCATION SIMPLE: RIGHT TEMPLE

## 2021-11-19 ASSESSMENT — LOCATION ZONE DERM: LOCATION ZONE: FACE

## 2021-11-19 NOTE — PROCEDURE: LIQUID NITROGEN
Progress Notes by Jennie Blevins at 10/13/17 01:32 PM     Author:  Jennie Blevins Service:  (none) Author Type:  Technician     Filed:  11/08/17 10:27 AM Encounter Date:  10/13/2017 Status:  Signed     :  Jennie Blevins (Technician)            Francoise Norman is here today for:[IA1.1T] VF 24-2[IA1.1M]  The test was performed with[IA1.1T] good[IA1.2M] compliance.     Diagnosis:[IA1.1T] Glaucoma Suspect[IA1.2M]    Patient is using:[IA1.1T] None[IA1.2M]    Patient Phone #: [phone] (home)   Pending appointment:[IA1.1T] Today[IA1.2M]    Electronically Signed by:    Jennie Blevins , 10/13/2017[IA1.1T]        Revision History        User Key Date/Time User Provider Type Action    > IA1.2 11/08/17 10:27 AM Jennie Blevins Technician Sign     IA1.1 10/13/17 01:32 PM Jennie Blevins Technician     M - Manual, T - Template            
Show Applicator Variable?: Yes
Render Post-Care Instructions In Note?: no
Number Of Freeze-Thaw Cycles: 2 freeze-thaw cycles
Detail Level: Detailed
Consent: The patient's consent was obtained including but not limited to risks of crusting, scabbing, blistering, scarring, darker or lighter pigmentary change, recurrence, incomplete removal and infection.
Post-Care Instructions: I reviewed with the patient in detail post-care instructions. Patient is to wear sunprotection, and avoid picking at any of the treated lesions. Pt may apply Vaseline to crusted or scabbing areas.
Duration Of Freeze Thaw-Cycle (Seconds): 0

## 2022-03-02 ENCOUNTER — PATIENT MESSAGE (OUTPATIENT)
Dept: HEALTH INFORMATION MANAGEMENT | Facility: OTHER | Age: 82
End: 2022-03-02
Payer: MEDICARE

## 2022-06-30 ENCOUNTER — TELEPHONE (OUTPATIENT)
Dept: HEALTH INFORMATION MANAGEMENT | Facility: OTHER | Age: 82
End: 2022-06-30
Payer: MEDICARE

## 2022-06-30 NOTE — TELEPHONE ENCOUNTER
Outcome: Mbr declined CHRISTIANO and declined to verify HIPAA.     Please transfer to Patient Outreach Team at [phone] when patient returns call.    Attempt # 2

## 2022-10-12 ENCOUNTER — OFFICE VISIT (OUTPATIENT)
Dept: URGENT CARE | Facility: CLINIC | Age: 82
End: 2022-10-12
Payer: MEDICARE

## 2022-10-12 VITALS
RESPIRATION RATE: 12 BRPM | OXYGEN SATURATION: 92 % | TEMPERATURE: 97.2 F | HEIGHT: 65 IN | WEIGHT: 140 LBS | BODY MASS INDEX: 23.32 KG/M2 | DIASTOLIC BLOOD PRESSURE: 58 MMHG | SYSTOLIC BLOOD PRESSURE: 112 MMHG | HEART RATE: 90 BPM

## 2022-10-12 DIAGNOSIS — H61.21 EXCESSIVE CERUMEN IN RIGHT EAR CANAL: ICD-10-CM

## 2022-10-12 PROCEDURE — 99202 OFFICE O/P NEW SF 15 MIN: CPT | Performed by: NURSE PRACTITIONER

## 2022-10-12 ASSESSMENT — ENCOUNTER SYMPTOMS: FEVER: 0

## 2022-10-12 NOTE — PROGRESS NOTES
Subjective:     Norah Mchugh is a 82 y.o. female who presents for Cerumen Impaction (RT ear wax build up, muffle hearing. New hearing aids.)      Got new hearing aids. Was told she had cerumen in right canal. Denies pain. Chronic tinnitus.     Cerumen Impaction  This is a new problem. Pertinent negatives include no fever.     Past Medical History:   Diagnosis Date    CATARACT     Glaucoma     left    Hypertension     Pain 12    left hip, 2 w/movement    Polio     Unspecified disorder of thyroid     hypo       Past Surgical History:   Procedure Laterality Date    CATARACT PHACO WITH IOL  2013    Performed by David Bay M.D. at SURGERY SAME DAY [facility]    INCISION AND DRAINAGE ORTHOPEDIC  2012    Performed by GHULAM BETTS at SURGERY [facility]    HIP ARTHROPLASTY TOTAL  2012    Performed by Ghulam Betts M.D. at SURGERY [facility]    JOINT INJECTION DIAGNOSTIC  2012    Performed by GHULAM BETTS at SURGERY [facility]    OTHER ORTHOPEDIC SURGERY      left hip fx (repair)    OTHER ORTHOPEDIC SURGERY  2002    R foot     BUNIONECTOMY      right, has to wear shoe when up    OTHER      macular hole repair       Social History     Socioeconomic History    Marital status:      Spouse name: Not on file    Number of children: Not on file    Years of education: Not on file    Highest education level: Not on file   Occupational History    Not on file   Tobacco Use    Smoking status: Former     Packs/day: 2.00     Years: 20.00     Pack years: 40.00     Types: Cigarettes     Quit date: 1972     Years since quittin.8    Smokeless tobacco: Never   Vaping Use    Vaping Use: Never used   Substance and Sexual Activity    Alcohol use: Not Currently     Alcohol/week: 8.4 oz     Types: 14 Glasses of wine per week     Comment: 2 glasses of red wine/night     Drug use: No    Sexual activity: Yes     Partners: Male     Birth control/protection:  "Post-Menopausal   Other Topics Concern    Not on file   Social History Narrative    Not on file     Social Determinants of Health     Financial Resource Strain: Not on file   Food Insecurity: Not on file   Transportation Needs: Not on file   Physical Activity: Not on file   Stress: Not on file   Social Connections: Not on file   Intimate Partner Violence: Not on file   Housing Stability: Not on file        Family History   Problem Relation Age of Onset    Heart Disease Mother     Other Father         suicide    Psychiatric Illness Father     Cancer Sister         liver    Psychiatric Illness Brother         depression    Other Brother         suicide        Allergies   Allergen Reactions    Milk Protein Nausea    Other Drug Nausea     An antibiotic, name unknown       Review of Systems   Constitutional:  Negative for fever.   HENT:  Positive for hearing loss and tinnitus. Negative for ear discharge and ear pain.    All other systems reviewed and are negative.     Objective:   /58   Pulse 90   Temp 36.2 °C (97.2 °F) (Temporal)   Resp 12   Ht 1.638 m (5' 4.5\")   Wt 63.5 kg (140 lb)   SpO2 92%   BMI 23.66 kg/m²     Physical Exam  Vitals reviewed.   HENT:      Right Ear: No drainage, swelling or tenderness. There is no impacted cerumen. Tympanic membrane is not perforated or erythematous.      Left Ear: Tympanic membrane, ear canal and external ear normal.      Ears:      Comments: Right canal not fully obstructed, partial obstruction. Able to visualize TM. No erythema.   Skin:     General: Skin is warm and dry.   Neurological:      General: No focal deficit present.      Mental Status: She is alert and oriented to person, place, and time.   Psychiatric:         Mood and Affect: Mood normal.         Behavior: Behavior normal.       Assessment/Plan:   1. Excessive cerumen in right ear canal  -Right ear irrigation    Follow up for ear pain or drainage, or any other concerns.    Differential diagnosis, natural " history, supportive care, and indications for immediate follow-up discussed.

## 2022-10-26 ENCOUNTER — HOSPITAL ENCOUNTER (OUTPATIENT)
Dept: LAB | Facility: MEDICAL CENTER | Age: 82
End: 2022-10-26
Attending: FAMILY MEDICINE
Payer: MEDICARE

## 2022-10-26 LAB
ALBUMIN SERPL BCP-MCNC: 4.2 G/DL (ref 3.2–4.9)
ALBUMIN/GLOB SERPL: 1.7 G/DL
ALP SERPL-CCNC: 86 U/L (ref 30–99)
ALT SERPL-CCNC: 11 U/L (ref 2–50)
ANION GAP SERPL CALC-SCNC: 11 MMOL/L (ref 7–16)
AST SERPL-CCNC: 16 U/L (ref 12–45)
BASOPHILS # BLD AUTO: 0.5 % (ref 0–1.8)
BASOPHILS # BLD: 0.04 K/UL (ref 0–0.12)
BILIRUB SERPL-MCNC: 0.5 MG/DL (ref 0.1–1.5)
BUN SERPL-MCNC: 8 MG/DL (ref 8–22)
CALCIUM SERPL-MCNC: 9.1 MG/DL (ref 8.4–10.2)
CHLORIDE SERPL-SCNC: 98 MMOL/L (ref 96–112)
CHOLEST SERPL-MCNC: 177 MG/DL (ref 100–199)
CO2 SERPL-SCNC: 27 MMOL/L (ref 20–33)
CREAT SERPL-MCNC: 0.59 MG/DL (ref 0.5–1.4)
EOSINOPHIL # BLD AUTO: 0.08 K/UL (ref 0–0.51)
EOSINOPHIL NFR BLD: 1.1 % (ref 0–6.9)
ERYTHROCYTE [DISTWIDTH] IN BLOOD BY AUTOMATED COUNT: 45.8 FL (ref 35.9–50)
GFR SERPLBLD CREATININE-BSD FMLA CKD-EPI: 89 ML/MIN/1.73 M 2
GLOBULIN SER CALC-MCNC: 2.5 G/DL (ref 1.9–3.5)
GLUCOSE SERPL-MCNC: 97 MG/DL (ref 65–99)
HCT VFR BLD AUTO: 47.9 % (ref 37–47)
HDLC SERPL-MCNC: 73 MG/DL
HGB BLD-MCNC: 15.8 G/DL (ref 12–16)
IMM GRANULOCYTES # BLD AUTO: 0.01 K/UL (ref 0–0.11)
IMM GRANULOCYTES NFR BLD AUTO: 0.1 % (ref 0–0.9)
LDLC SERPL CALC-MCNC: 88 MG/DL
LYMPHOCYTES # BLD AUTO: 1.74 K/UL (ref 1–4.8)
LYMPHOCYTES NFR BLD: 23.2 % (ref 22–41)
MCH RBC QN AUTO: 30.9 PG (ref 27–33)
MCHC RBC AUTO-ENTMCNC: 33 G/DL (ref 33.6–35)
MCV RBC AUTO: 93.7 FL (ref 81.4–97.8)
MONOCYTES # BLD AUTO: 0.38 K/UL (ref 0–0.85)
MONOCYTES NFR BLD AUTO: 5.1 % (ref 0–13.4)
NEUTROPHILS # BLD AUTO: 5.26 K/UL (ref 2–7.15)
NEUTROPHILS NFR BLD: 70 % (ref 44–72)
NRBC # BLD AUTO: 0 K/UL
NRBC BLD-RTO: 0 /100 WBC
PLATELET # BLD AUTO: 237 K/UL (ref 164–446)
PMV BLD AUTO: 10.1 FL (ref 9–12.9)
POTASSIUM SERPL-SCNC: 4.3 MMOL/L (ref 3.6–5.5)
PROT SERPL-MCNC: 6.7 G/DL (ref 6–8.2)
RBC # BLD AUTO: 5.11 M/UL (ref 4.2–5.4)
SODIUM SERPL-SCNC: 136 MMOL/L (ref 135–145)
T3FREE SERPL-MCNC: 4.98 PG/ML (ref 2–4.4)
T4 FREE SERPL-MCNC: 1.21 NG/DL (ref 0.93–1.7)
TRIGL SERPL-MCNC: 82 MG/DL (ref 0–149)
TSH SERPL DL<=0.005 MIU/L-ACNC: 0.23 UIU/ML (ref 0.38–5.33)
WBC # BLD AUTO: 7.5 K/UL (ref 4.8–10.8)

## 2022-10-26 PROCEDURE — 80061 LIPID PANEL: CPT

## 2022-10-26 PROCEDURE — 84481 FREE ASSAY (FT-3): CPT

## 2022-10-26 PROCEDURE — 36415 COLL VENOUS BLD VENIPUNCTURE: CPT

## 2022-10-26 PROCEDURE — 84443 ASSAY THYROID STIM HORMONE: CPT

## 2022-10-26 PROCEDURE — 84439 ASSAY OF FREE THYROXINE: CPT

## 2022-10-26 PROCEDURE — 80053 COMPREHEN METABOLIC PANEL: CPT

## 2022-10-26 PROCEDURE — 85025 COMPLETE CBC W/AUTO DIFF WBC: CPT

## 2022-10-31 NOTE — PROGRESS NOTES
Pt awake and alert. No evidence of pain. Medications administered per MAR. PRN hydralazine and one-time dose metoprolol given for elevated SBP. On RA. NPO. NGT removed by patient overnight. Per NP, can hold off on reinsertion. Mitten restraints discontinued. Hill to gravity, draining clear yellow urine. Cardiac monitoring in progress. Blood glucose monitored, supplemental insulin given. Dentures found in pt's bed this AM, placed in labeled denture cup at bedside. Safety maintained with bed alarm set, side rails raised, and call light in reach.   Subjective:      Pt is a 76 y.o. female who presents with Foreign Body in Nose            HPI  Pt states that she was eating a meatball last night for dinner and breathed in and felt a FB on her right nostril and felt a piece of food was stuck. Pt has not taken any Rx medications for this condition. Pt notes B/L nostrils are patent. Pt states the pain is a 1/10, aching in nature and worse at night. Pt denies CP, SOB, NVD, paresthesias, headaches, dizziness, change in vision, hives, or other joint pain. The pt's medication list, problem list, and allergies have been evaluated and reviewed during today's visit.    PMH:  Past Medical History   Diagnosis Date   • Hypertension    • Glaucoma      left   • Pain 05-18-12     left hip, 2 w/movement   • CATARACT    • Polio    • Unspecified disorder of thyroid      hypo       PSH:  Past Surgical History   Procedure Laterality Date   • Joint injection diagnostic  5/24/2012     Performed by GHULAM COE at SURGERY [facility]   • Other       macular hole repair   • Bunionectomy       right, has to wear shoe when up   • Hip arthroplasty total  7/26/2012     Performed by Ghulam Coe M.D. at SURGERY [facility]   • Other orthopedic surgery  2007     left hip fx (repair)   • Other orthopedic surgery  2002     R foot    • Incision and drainage orthopedic  8/30/2012     Performed by GHULAM COE at SURGERY [facility]   • Cataract phaco with iol  2/26/2013     Performed by David Bay M.D. at SURGERY SAME DAY [facility]       Fam Hx:  Noncontributory    Soc HX:  Social History     Social History   • Marital Status:      Spouse Name: N/A   • Number of Children: N/A   • Years of Education: N/A     Occupational History   • Not on file.     Social History Main Topics   • Smoking status: Former Smoker -- 2.00 packs/day     Types: Cigarettes     Quit date: 01/01/1972   • Smokeless tobacco: Never Used   • Alcohol Use: Yes      Comment: 2 glasses of red  wine/night    • Drug Use: No   • Sexual Activity: Not on file     Other Topics Concern   • Not on file     Social History Narrative         Medications:    Current outpatient prescriptions:   •  NATURE-THROID 65 MG tablet, Take 65 mg by mouth., Disp: , Rfl: 3  •  Non Formulary Request, Vessel care, Disp: , Rfl:   •  Non Formulary Request, Blue louie, three tabs, BID., Disp: , Rfl:   •  docosahexanoic acid (OMEGA 3 FA) 1000 MG CAPS, Take 1,000 mg by mouth 3 times a day, with meals., Disp: , Rfl:   •  rosuvastatin (CRESTOR) 5 MG TABS, Take 1 Tab by mouth every evening., Disp: 30 Tab, Rfl: 11  •  atorvastatin (LIPITOR) 10 MG TABS, Take 1 Tab by mouth every day., Disp: 30 Tab, Rfl: 11  •  aspirin EC (ECOTRIN) 81 MG TBEC, Take 1 Tab by mouth every day., Disp: 30 Tab, Rfl: 11  •  ARMOUR THYROID PO, Take 65 mg by mouth every day.  , Disp: , Rfl:   •  Ascorbic Acid (VITAMIN C PO), Take 2,000 mg by mouth every day., Disp: , Rfl:   •  Cholecalciferol (VITAMIN D3) 5000 UNIT TABS, Take  by mouth every day., Disp: , Rfl:       Allergies:  Milk protein and Other drug      ROS  Constitutional: Negative for fever, chills and malaise/fatigue.   HENT: Negative for congestion and sore throat.  +FB sensation in right nostril  Eyes: Negative for blurred vision, double vision and photophobia.   Respiratory: Negative for cough and shortness of breath.    Cardiovascular: Negative for chest pain and palpitations.   Gastrointestinal: Negative for heartburn, nausea, vomiting, abdominal pain, diarrhea and constipation.   Genitourinary: Negative for dysuria and flank pain.   Musculoskeletal: Negative for joint pain and myalgias.   Skin: Negative for itching and rash.   Neurological: Negative for dizziness, tingling and headaches.   Endo/Heme/Allergies: Does not bruise/bleed easily.   Psychiatric/Behavioral: Negative for depression. The patient is not nervous/anxious.           Objective:     /74 mmHg  Pulse 89  Temp(Src) 36.1 °C (97 °F)   SpO2 95%     Physical Exam   HENT:   Nose: Mucosal edema present. No rhinorrhea, nose lacerations, sinus tenderness, nasal deformity, septal deviation or nasal septal hematoma. No epistaxis.  No foreign bodies. Right sinus exhibits no maxillary sinus tenderness and no frontal sinus tenderness. Left sinus exhibits no maxillary sinus tenderness and no frontal sinus tenderness.              Constitutional: PT is oriented to person, place, and time. PT appears well-developed and well-nourished. No distress.   HENT:   Head: Normocephalic and atraumatic.   Mouth/Throat: Oropharynx is clear and moist. No oropharyngeal exudate.   Eyes: Conjunctivae normal and EOM are normal. Pupils are equal, round, and reactive to light.   Neck: Normal range of motion. Neck supple. No thyromegaly present.   Cardiovascular: Normal rate, regular rhythm, normal heart sounds and intact distal pulses.  Exam reveals no gallop and no friction rub.    No murmur heard.  Pulmonary/Chest: Effort normal and breath sounds normal. No respiratory distress. PT has no wheezes. PT has no rales. Pt exhibits no tenderness.   Abdominal: Soft. Bowel sounds are normal. PT exhibits no distension and no mass. There is no tenderness. There is no rebound and no guarding.   Musculoskeletal: Normal range of motion. PT exhibits no edema and no tenderness.   Neurological: PT is alert and oriented to person, place, and time. PT has normal reflexes. No cranial nerve deficit.   Skin: Skin is warm and dry. No rash noted. PT is not diaphoretic. No erythema.       Psychiatric: PT has a normal mood and affect. PT behavior is normal. Judgment and thought content normal.        Assessment/Plan:     1. Acute foreign body of nose, initial encounter      Encouraged Neti pot therapy  Rest, fluids encouraged.  AVS with medical info given.  Pt was in full understanding and agreement with the plan.  Follow-up as needed if symptoms worsen or fail to improve.

## 2023-02-02 ENCOUNTER — HOSPITAL ENCOUNTER (OUTPATIENT)
Dept: LAB | Facility: MEDICAL CENTER | Age: 83
End: 2023-02-02
Attending: FAMILY MEDICINE
Payer: MEDICARE

## 2023-02-02 LAB
25(OH)D3 SERPL-MCNC: 112 NG/ML (ref 30–100)
T3FREE SERPL-MCNC: 2.58 PG/ML (ref 2–4.4)
T4 FREE SERPL-MCNC: 1.11 NG/DL (ref 0.93–1.7)
TSH SERPL DL<=0.005 MIU/L-ACNC: 0.8 UIU/ML (ref 0.38–5.33)

## 2023-02-02 PROCEDURE — 84481 FREE ASSAY (FT-3): CPT

## 2023-02-02 PROCEDURE — 84439 ASSAY OF FREE THYROXINE: CPT

## 2023-02-02 PROCEDURE — 84443 ASSAY THYROID STIM HORMONE: CPT

## 2023-02-02 PROCEDURE — 82306 VITAMIN D 25 HYDROXY: CPT

## 2023-02-02 PROCEDURE — 36415 COLL VENOUS BLD VENIPUNCTURE: CPT

## 2023-05-23 ENCOUNTER — HOME HEALTH ADMISSION (OUTPATIENT)
Dept: HOME HEALTH SERVICES | Facility: HOME HEALTHCARE | Age: 83
End: 2023-05-23
Payer: MEDICARE

## 2023-05-31 ENCOUNTER — HOME CARE VISIT (OUTPATIENT)
Dept: HOME HEALTH SERVICES | Facility: HOME HEALTHCARE | Age: 83
End: 2023-05-31
Payer: MEDICARE

## 2023-05-31 VITALS
RESPIRATION RATE: 16 BRPM | HEART RATE: 97 BPM | SYSTOLIC BLOOD PRESSURE: 132 MMHG | TEMPERATURE: 98.1 F | OXYGEN SATURATION: 94 % | DIASTOLIC BLOOD PRESSURE: 80 MMHG

## 2023-05-31 PROCEDURE — 665001 SOC-HOME HEALTH

## 2023-05-31 PROCEDURE — G0493 RN CARE EA 15 MIN HH/HOSPICE: HCPCS

## 2023-05-31 ASSESSMENT — ENCOUNTER SYMPTOMS
VOMITING: DENIES
NAUSEA: DENIES
STOOL FREQUENCY: DAILY
BOWEL PATTERN NORMAL: 1
PAIN: PATIENT DENIES ANY PAIN AT THIS TIME.
DENIES PAIN: 1
LAST BOWEL MOVEMENT: 66625

## 2023-05-31 ASSESSMENT — ACTIVITIES OF DAILY LIVING (ADL): OASIS_M1830: 05

## 2023-05-31 NOTE — Clinical Note
Primary Diagnosis: Postpolio syndrome and PAD  Skilled Need: Health assessment, chronic disease management, rehabilitation, and education.  Nursing frequency: 1W1, 1W3, 3PRNs  Zip Code: [address]  Disciplines ordered: SN, PT, OT, MSW, and HHA   Insurance: [facility]  Certification period: 5/31/23-7/29/23  Special considerations: None

## 2023-05-31 NOTE — Clinical Note
Opened patient to [facility] medication reconciliation process done. See MAR for drug allergy interactions. There is no major drug to drug interactions.    The best contact phone number is [phone] and the patient is currently managing her medications.

## 2023-06-01 ENCOUNTER — HOME CARE VISIT (OUTPATIENT)
Dept: HOME HEALTH SERVICES | Facility: HOME HEALTHCARE | Age: 83
End: 2023-06-01
Payer: MEDICARE

## 2023-06-01 ENCOUNTER — DOCUMENTATION (OUTPATIENT)
Dept: MEDICAL GROUP | Facility: PHYSICIAN GROUP | Age: 83
End: 2023-06-01
Payer: MEDICARE

## 2023-06-01 NOTE — PROGRESS NOTES
Medication chart review for [facility] services    Received referral from [facility].   Medications reviewed  compared with discharge summary if available.  Discharge summary date, if applicable:   N/a    Current medication list per [facility]     Medication list one, patient is currently taking    Current Outpatient Medications:     thyroid, 3 Tablet, Oral, DAILY    Multiple Vitamins-Minerals (MULTIVITAMIN & MINERAL PO), 1 Tablet, Oral, DAILY AT 1800    Vitamin D3, Take  by mouth every day. Indications: Vitamin D Deficiency      Medication list two, drugs that the patient has been prescribed or recommended to take by their healthcare provider on discharge summary  N/a    Allergies   Allergen Reactions    Milk Protein Nausea    Other Drug Nausea     An antibiotic, name unknown       Labs     Lab Results   Component Value Date/Time    SODIUM 136 10/26/2022 10:28 AM    POTASSIUM 4.3 10/26/2022 10:28 AM    CHLORIDE 98 10/26/2022 10:28 AM    CO2 27 10/26/2022 10:28 AM    GLUCOSE 97 10/26/2022 10:28 AM    BUN 8 10/26/2022 10:28 AM    CREATININE 0.59 10/26/2022 10:28 AM     Lab Results   Component Value Date/Time    ALKPHOSPHAT 86 10/26/2022 10:28 AM    ASTSGOT 16 10/26/2022 10:28 AM    ALTSGPT 11 10/26/2022 10:28 AM    TBILIRUBIN 0.5 10/26/2022 10:28 AM    INR 1.03 07/26/2012 12:40 PM    ALBUMIN 4.2 10/26/2022 10:28 AM        Assessment for clinically significant drug interactions, drug omissions/additions, duplicative therapies.            CC   Momo Pichardo M.D.  [address]  [facility] [address]  Fax: [phone]    [facility]  Phone [phone] fax [phone]    This note was created using voice recognition software (Dragon). The accuracy of the dictation is limited by the abilities of the software. I have reviewed the note prior to signing, however some errors in grammar and context are still possible. If you have any questions related to this note please do  not hesitate to contact our office.

## 2023-06-01 NOTE — CASE COMMUNICATION
noted  ----- Message -----  From: Tangela Hale R.N.  Sent: 5/31/2023   1:48 PM PDT  To: Dania Andrade R.N.; Velma Joseph R.N.; *      Primary Diagnosis: Postpolio syndrome and PAD  Skilled Need: Health assessment, chronic disease management, rehabilitation, and education.  Nursing frequency: 1W1, 1W3, 3PRNs  Zip Code: [address]  Disciplines ordered: SN, PT, OT, MSW, and HHA   Insurance: [facility]  Certification period: 5/31/23-7/29/23  Speci al considerations: None

## 2023-06-01 NOTE — CASE COMMUNICATION
noted  ----- Message -----  From: SRINI Meyer, MSW  Sent: 6/1/2023   9:28 AM PDT  To: Dania Andrade R.N.; Velma Joseph R.N.      MSW d/omi order at request of daughter.

## 2023-06-02 ENCOUNTER — HOME CARE VISIT (OUTPATIENT)
Dept: HOME HEALTH SERVICES | Facility: HOME HEALTHCARE | Age: 83
End: 2023-06-02
Payer: MEDICARE

## 2023-06-02 PROCEDURE — G0151 HHCP-SERV OF PT,EA 15 MIN: HCPCS

## 2023-06-02 NOTE — Clinical Note
PT evaluation completed, requesting follow up visits with frequency of 1w1,2w2 effective 06/02/2023.

## 2023-06-04 VITALS
TEMPERATURE: 97.8 F | RESPIRATION RATE: 16 BRPM | OXYGEN SATURATION: 94 % | SYSTOLIC BLOOD PRESSURE: 130 MMHG | HEART RATE: 78 BPM | DIASTOLIC BLOOD PRESSURE: 80 MMHG

## 2023-06-04 ASSESSMENT — ENCOUNTER SYMPTOMS
MUSCLE WEAKNESS: 1
DENIES PAIN: 1
PERSON REPORTING PAIN: PATIENT

## 2023-06-04 ASSESSMENT — ACTIVITIES OF DAILY LIVING (ADL)
AMBULATION ASSISTANCE: NON-AMBULATORY
CURRENT_FUNCTION: ONE PERSON

## 2023-06-05 ENCOUNTER — HOME CARE VISIT (OUTPATIENT)
Dept: HOME HEALTH SERVICES | Facility: HOME HEALTHCARE | Age: 83
End: 2023-06-05
Payer: MEDICARE

## 2023-06-05 PROCEDURE — G0151 HHCP-SERV OF PT,EA 15 MIN: HCPCS

## 2023-06-05 NOTE — Clinical Note
Spoke to pt's daughter and didn't want nursing visit today ,states pt already has other appointment ,too much for the patient. . Will reschedule , MELVIN Andrade,  notified.

## 2023-06-05 NOTE — CASE COMMUNICATION
noted  ----- Message -----  From: Pablo Rodríguez, PT  Sent: 6/4/2023   9:40 PM PDT  To: Dania Andrade R.N.; Alondra Vazquez, OT; *      PT evaluation completed, requesting follow up visits with frequency of 1w1,2w2 effective 06/02/2023.

## 2023-06-06 ENCOUNTER — HOME CARE VISIT (OUTPATIENT)
Dept: HOME HEALTH SERVICES | Facility: HOME HEALTHCARE | Age: 83
End: 2023-06-06
Payer: MEDICARE

## 2023-06-06 VITALS
HEART RATE: 89 BPM | OXYGEN SATURATION: 92 % | SYSTOLIC BLOOD PRESSURE: 130 MMHG | DIASTOLIC BLOOD PRESSURE: 70 MMHG | TEMPERATURE: 97.5 F | RESPIRATION RATE: 16 BRPM

## 2023-06-06 ASSESSMENT — ENCOUNTER SYMPTOMS
DENIES PAIN: 1
PERSON REPORTING PAIN: PATIENT

## 2023-06-06 NOTE — Clinical Note
I agree with these changes.  ----- Message -----  From: Bonnie Mathew R.N.  Sent: 6/6/2023  11:13 AM PDT  To: Tangela Hale R.N.      Quality Review Completed for 5/31/SOC OASIS by GHISLAINE Mathew, RN on 6/6/2023:     Edits completed by GHISLAINE Mathew, RN:     1.  and  diagnosis coding updated per chart review  2.  changed to 5/31 for LSOC ordered  3.  is early episode timing  4.  changed to #1, per BIMS memory/recall  5.  changed to #4 per PCP 5/23 OV, daughter reporting reduced memory/recall  6. Added cog deficits to MAHH-10 for total score of 5  7.  changed to 2,  changed to 5 per narrative uses a WC and is mod assist  8. [id number] C. E, changed to #3 per narrative mod assist  9. [id number] R, S changed to #3  10.  changed to 3 per guidelines when ambulation is supervised  11. Added exercises prescribed to 485 forms  12.  changed to 6 per therapy

## 2023-06-06 NOTE — CASE COMMUNICATION
Quality Review Completed for 5/31/SOC OASIS by GHISLAINE Mathew RN on 6/6/2023:     Edits completed by GHISLAINE Mathew RN:     1.  and  diagnosis coding updated per chart review  2.  changed to 5/31 for LSOC ordered  3.  is early episode timing  4.  changed to #1, per BIMS memory/recall  5.  changed to #4 per PCP 5/23 OV, daughter reporting reduced memory/recall  6. Added cog deficits to MAHH-10 for total score of 5   7.  changed to 2,  changed to 5 per narrative uses a WC and is mod assist  8. [id number] C. E, changed to #3 per narrative mod assist  9. [id number] R, S changed to #3  10.  changed to 3 per guidelines when ambulation is supervised  11. Added exercises prescribed to 485 forms  12.  changed to 6 per therapy

## 2023-06-06 NOTE — CASE COMMUNICATION
noted  ----- Message -----  From: Frances Bull R.N.  Sent: 6/5/2023   3:33 PM PDT  To: Dania Andrade R.N.; Velma Joseph R.N.; *      Spoke to pt's daughter and didn't want nursing visit today ,states pt already has other appointment ,too much for the patient. . Will reschedule , MELVIN Andrade,  notified.

## 2023-06-07 ENCOUNTER — HOME CARE VISIT (OUTPATIENT)
Dept: HOME HEALTH SERVICES | Facility: HOME HEALTHCARE | Age: 83
End: 2023-06-07
Payer: MEDICARE

## 2023-06-07 VITALS
DIASTOLIC BLOOD PRESSURE: 70 MMHG | HEART RATE: 74 BPM | SYSTOLIC BLOOD PRESSURE: 125 MMHG | RESPIRATION RATE: 18 BRPM | OXYGEN SATURATION: 97 % | TEMPERATURE: 97.6 F

## 2023-06-07 PROCEDURE — G0151 HHCP-SERV OF PT,EA 15 MIN: HCPCS

## 2023-06-07 ASSESSMENT — ENCOUNTER SYMPTOMS
DENIES PAIN: 1
PERSON REPORTING PAIN: PATIENT

## 2023-06-08 ENCOUNTER — HOME CARE VISIT (OUTPATIENT)
Dept: HOME HEALTH SERVICES | Facility: HOME HEALTHCARE | Age: 83
End: 2023-06-08
Payer: MEDICARE

## 2023-06-08 VITALS
RESPIRATION RATE: 16 BRPM | DIASTOLIC BLOOD PRESSURE: 60 MMHG | SYSTOLIC BLOOD PRESSURE: 124 MMHG | TEMPERATURE: 97.7 F | HEART RATE: 70 BPM | OXYGEN SATURATION: 98 %

## 2023-06-08 PROCEDURE — G0299 HHS/HOSPICE OF RN EA 15 MIN: HCPCS

## 2023-06-08 ASSESSMENT — ENCOUNTER SYMPTOMS
LAST BOWEL MOVEMENT: 66633
MUSCLE WEAKNESS: 1
PERSON REPORTING PAIN: PATIENT
HYPERTENSION: 1
LIMITED RANGE OF MOTION: 1
VOMITING: DENIES
BOWEL PATTERN NORMAL: 1
NAUSEA: DENIES
DENIES PAIN: 1

## 2023-06-08 ASSESSMENT — ACTIVITIES OF DAILY LIVING (ADL): AMBULATION ASSISTANCE: NON-AMBULATORY

## 2023-06-08 NOTE — Clinical Note
SN Visit Summary on 6/8/23    Patient axox4 and very pleasant. All VSS. Patient reports occasional light-headedness which subsides by itself briefly. She denies other symptoms. Drinks 40 oz of oral fluid daily. Encouraged patient to increase to 64 oz.   Patient reports frequent falls prior to HH SOC but no new falls so far. Severe arthritis in right knee but no pain per patient. She had consulted ortho and was told that she is not a candidate for surgery due to hx of polio.  Patient reports being chair-bound and only does sponge bath for the past two years. She asks to hold HHA visits for now due to difficulty getting in shower tub. OT eval scheduled for next week and patient is aware.   Med reconciled. Patient reports no longer taking multivitamins. List updated and new copy placed in pt's binder. Reviewed use of [place] [facility].  Reviewed HH care plan, visit frequency, 24/7 hotline, fall and infection prevention, pressure injury prevention. Patient voiced understanding.

## 2023-06-09 NOTE — CASE COMMUNICATION
noted  ----- Message -----  From: Martinez Adames R.N.  Sent: 6/8/2023  11:21 AM PDT  To: Dania Andrade R.N.; Velma Joseph R.N.; *      SN Visit Summary on 6/8/23    Patient axox4 and very pleasant. All VSS. Patient reports occasional light-headedness which subsides by itself briefly. She denies other symptoms. Drinks 40 oz of oral fluid daily. Encouraged patient to increase to 64 oz.   Patient reports frequent falls prior to HH SOC but  no new falls so far. Severe arthritis in right knee but no pain per patient. She had consulted ortho and was told that she is not a candidate for surgery due to hx of polio.  Patient reports being chair-bound and only does sponge bath for the past two years. She asks to hold HHA visits for now due to difficulty getting in shower tub. OT eval scheduled for next week and patient is aware.   Med reconciled. Patient reports no longer takin g multivitamins. List updated and new copy placed in pt's binder. Reviewed use of [place] [facility].  Reviewed HH care plan, visit frequency, 24/7 hotline, fall and infection prevention, pressure injury prevention. Patient voiced understanding.

## 2023-06-12 ENCOUNTER — HOME CARE VISIT (OUTPATIENT)
Dept: HOME HEALTH SERVICES | Facility: HOME HEALTHCARE | Age: 83
End: 2023-06-12
Payer: MEDICARE

## 2023-06-12 VITALS
RESPIRATION RATE: 16 BRPM | SYSTOLIC BLOOD PRESSURE: 128 MMHG | OXYGEN SATURATION: 96 % | HEART RATE: 72 BPM | TEMPERATURE: 98.1 F | DIASTOLIC BLOOD PRESSURE: 80 MMHG

## 2023-06-12 PROCEDURE — G0151 HHCP-SERV OF PT,EA 15 MIN: HCPCS

## 2023-06-12 ASSESSMENT — ENCOUNTER SYMPTOMS
DENIES PAIN: 1
PERSON REPORTING PAIN: PATIENT

## 2023-06-13 ENCOUNTER — HOME CARE VISIT (OUTPATIENT)
Dept: HOME HEALTH SERVICES | Facility: HOME HEALTHCARE | Age: 83
End: 2023-06-13
Payer: MEDICARE

## 2023-06-13 PROCEDURE — G0493 RN CARE EA 15 MIN HH/HOSPICE: HCPCS

## 2023-06-13 PROCEDURE — G0180 MD CERTIFICATION HHA PATIENT: HCPCS | Performed by: FAMILY MEDICINE

## 2023-06-13 PROCEDURE — G0152 HHCP-SERV OF OT,EA 15 MIN: HCPCS

## 2023-06-14 ENCOUNTER — HOME CARE VISIT (OUTPATIENT)
Dept: HOME HEALTH SERVICES | Facility: HOME HEALTHCARE | Age: 83
End: 2023-06-14
Payer: MEDICARE

## 2023-06-14 VITALS
OXYGEN SATURATION: 98 % | DIASTOLIC BLOOD PRESSURE: 70 MMHG | HEART RATE: 85 BPM | RESPIRATION RATE: 16 BRPM | SYSTOLIC BLOOD PRESSURE: 138 MMHG | TEMPERATURE: 98.1 F

## 2023-06-14 PROCEDURE — G0151 HHCP-SERV OF PT,EA 15 MIN: HCPCS

## 2023-06-14 ASSESSMENT — ACTIVITIES OF DAILY LIVING (ADL)
TOILETING: INDEPENDENT
PHYSICAL TRANSFERS ASSESSED: 1
GROOMING_CURRENT_FUNCTION: INDEPENDENT
LAUNDRY: NEEDS ASSISTANCE
DRESSING_UB_CURRENT_FUNCTION: INDEPENDENT
GROOMING ASSESSED: 1
DRESSING_LB_CURRENT_FUNCTION: INDEPENDENT
ORAL_CARE_CURRENT_FUNCTION: INDEPENDENT
FEEDING: INDEPENDENT
LAUNDRY ASSESSED: 1
ORAL_CARE_ASSESSED: 1
CURRENT_FUNCTION: STAND BY ASSIST
PREPARING MEALS: NEEDS ASSISTANCE
TOILETING: 1
FEEDING ASSESSED: 1

## 2023-06-14 ASSESSMENT — ENCOUNTER SYMPTOMS
PERSON REPORTING PAIN: PATIENT
DENIES PAIN: 1

## 2023-06-14 NOTE — CASE COMMUNICATION
noted  ----- Message -----  From: Alondra Vazquez, OT  Sent: 6/14/2023   3:51 PM PDT  To: Dania Andrade R.N.; Jamshid Zimmerman; *      OT evaluation completed 6/13/23. Requesting auth for 1w3, 2PRN.

## 2023-06-15 ENCOUNTER — HOME CARE VISIT (OUTPATIENT)
Dept: HOME HEALTH SERVICES | Facility: HOME HEALTHCARE | Age: 83
End: 2023-06-15
Payer: MEDICARE

## 2023-06-15 VITALS
OXYGEN SATURATION: 98 % | RESPIRATION RATE: 16 BRPM | TEMPERATURE: 98.1 F | SYSTOLIC BLOOD PRESSURE: 138 MMHG | DIASTOLIC BLOOD PRESSURE: 70 MMHG | HEART RATE: 85 BPM

## 2023-06-15 VITALS
DIASTOLIC BLOOD PRESSURE: 70 MMHG | OXYGEN SATURATION: 96 % | TEMPERATURE: 97.5 F | SYSTOLIC BLOOD PRESSURE: 138 MMHG | HEART RATE: 81 BPM | RESPIRATION RATE: 16 BRPM

## 2023-06-15 ASSESSMENT — ENCOUNTER SYMPTOMS
DENIES PAIN: 1
LAST BOWEL MOVEMENT: 66638
BOWEL PATTERN NORMAL: 1
DENIES PAIN: 1
STOOL FREQUENCY: DAILY
PERSON REPORTING PAIN: PATIENT

## 2023-06-15 ASSESSMENT — ACTIVITIES OF DAILY LIVING (ADL)
GROOMING_WITHIN_DEFINED_LIMITS: 1
FEEDING_WITHIN_DEFINED_LIMITS: 1

## 2023-06-15 NOTE — CASE COMMUNICATION
noted  ----- Message -----  From: Pablo Rodríguez, PT  Sent: 6/15/2023  12:23 AM PDT  To: Dania Andrade R.N.; Alondra Vazquez, SHEEBA      Patient discharged from homehealth PT effective 06/14/2023 with all goals met.
Stretcher Alarms/Hourly Rounding

## 2023-06-22 ENCOUNTER — HOME CARE VISIT (OUTPATIENT)
Dept: HOME HEALTH SERVICES | Facility: HOME HEALTHCARE | Age: 83
End: 2023-06-22
Payer: MEDICARE

## 2023-06-22 NOTE — Clinical Note
Missed OT visit on 6/22/23 due to daughter calling to cancel visit stating the patient did not feel well. May or may not be up for follow up OT visits int he future.

## 2023-06-27 ENCOUNTER — HOME CARE VISIT (OUTPATIENT)
Dept: HOME HEALTH SERVICES | Facility: HOME HEALTHCARE | Age: 83
End: 2023-06-27
Payer: MEDICARE

## 2023-07-05 ENCOUNTER — HOME CARE VISIT (OUTPATIENT)
Dept: HOME HEALTH SERVICES | Facility: HOME HEALTHCARE | Age: 83
End: 2023-07-05
Payer: MEDICARE

## 2023-07-10 NOTE — CASE COMMUNICATION
Missed OT visit on 6/27/23. Patient requesting agency discharge per daughter, states patient is not up for therapy at this time and does not like to change her routine. Patient will be discharged from agency per patient request.

## 2023-07-11 NOTE — CASE COMMUNICATION
noted  ----- Message -----  From: Alondra Vazquez, OT  Sent: 7/10/2023   4:49 PM PDT  To: Dania Andrade R.N.; Delores Myles      Missed OT visit on 6/27/23. Patient requesting agency discharge per daughter, states patient is not up for therapy at this time and does not like to change her routine. Patient will be discharged from agency per patient request.

## 2023-07-20 ENCOUNTER — HOME CARE VISIT (OUTPATIENT)
Dept: HOME HEALTH SERVICES | Facility: HOME HEALTHCARE | Age: 83
End: 2023-07-20
Payer: MEDICARE

## 2023-07-23 ASSESSMENT — ENCOUNTER SYMPTOMS
PERSON REPORTING PAIN: PATIENT
DENIES PAIN: 1

## 2023-07-25 ASSESSMENT — PATIENT HEALTH QUESTIONNAIRE - PHQ9: CLINICAL INTERPRETATION OF PHQ2 SCORE: 0

## 2023-07-25 ASSESSMENT — ACTIVITIES OF DAILY LIVING (ADL)
OASIS_M1830: 04
HOME_HEALTH_OASIS: 00

## 2023-07-26 ENCOUNTER — HOME CARE VISIT (OUTPATIENT)
Dept: HOME HEALTH SERVICES | Facility: HOME HEALTHCARE | Age: 83
End: 2023-07-26
Payer: MEDICARE

## 2023-07-26 ASSESSMENT — PATIENT HEALTH QUESTIONNAIRE - PHQ9: PATIENT UNABLE TO COMPLETE PHQ: OFFICE DC

## 2023-07-26 NOTE — CASE COMMUNICATION
Quality Review for 7.20.23 DC OASIS performed on by NIRANJAN Agosto RN on 7.26.2023     Edits completed by NIRANJAN Agosto RN:  1. Changed  to not assessed/99 as this was an office dc  2. Changed  E to yes per POC

## 2023-07-26 NOTE — CASE COMMUNICATION
noted  ----- Message -----  From: Pablo Rodríguez, PT  Sent: 7/25/2023  10:40 PM PDT  To: Dania Andrade R.N.; Jamshid Zimmerman      Patient discharged from home health agency effective 07/20/2023 as per patient's request.

## 2023-07-31 ENCOUNTER — APPOINTMENT (RX ONLY)
Dept: URBAN - METROPOLITAN AREA CLINIC 35 | Facility: CLINIC | Age: 83
Setting detail: DERMATOLOGY
End: 2023-07-31

## 2023-07-31 DIAGNOSIS — D485 NEOPLASM OF UNCERTAIN BEHAVIOR OF SKIN: ICD-10-CM | Status: INADEQUATELY CONTROLLED

## 2023-07-31 DIAGNOSIS — Z71.89 OTHER SPECIFIED COUNSELING: ICD-10-CM

## 2023-07-31 DIAGNOSIS — L57.0 ACTINIC KERATOSIS: ICD-10-CM

## 2023-07-31 PROBLEM — D48.5 NEOPLASM OF UNCERTAIN BEHAVIOR OF SKIN: Status: ACTIVE | Noted: 2023-07-31

## 2023-07-31 PROCEDURE — ? LIQUID NITROGEN

## 2023-07-31 PROCEDURE — ? COUNSELING

## 2023-07-31 PROCEDURE — 11102 TANGNTL BX SKIN SINGLE LES: CPT

## 2023-07-31 PROCEDURE — 17000 DESTRUCT PREMALG LESION: CPT | Mod: 59

## 2023-07-31 PROCEDURE — ? SURGICAL DECISION MAKING

## 2023-07-31 PROCEDURE — ? SUNSCREEN RECOMMENDATIONS

## 2023-07-31 PROCEDURE — 11103 TANGNTL BX SKIN EA SEP/ADDL: CPT

## 2023-07-31 PROCEDURE — 99214 OFFICE O/P EST MOD 30 MIN: CPT | Mod: 25

## 2023-07-31 PROCEDURE — ? BIOPSY BY SHAVE METHOD

## 2023-07-31 PROCEDURE — ? SEPARATE AND IDENTIFIABLE DOCUMENTATION

## 2023-07-31 ASSESSMENT — LOCATION DETAILED DESCRIPTION DERM
LOCATION DETAILED: RIGHT RADIAL DORSAL HAND
LOCATION DETAILED: LEFT CENTRAL MANDIBULAR CHEEK
LOCATION DETAILED: LEFT SUPERIOR PARIETAL SCALP
LOCATION DETAILED: RIGHT CENTRAL TEMPLE

## 2023-07-31 ASSESSMENT — LOCATION SIMPLE DESCRIPTION DERM
LOCATION SIMPLE: LEFT CHEEK
LOCATION SIMPLE: SCALP
LOCATION SIMPLE: RIGHT HAND
LOCATION SIMPLE: RIGHT TEMPLE

## 2023-07-31 ASSESSMENT — LOCATION ZONE DERM
LOCATION ZONE: HAND
LOCATION ZONE: SCALP
LOCATION ZONE: FACE

## 2023-07-31 NOTE — PROCEDURE: MIPS QUALITY
Quality 226: Preventive Care And Screening: Tobacco Use: Screening And Cessation Intervention: Patient screened for tobacco use and is an ex/non-smoker
Detail Level: Generalized
Quality 111:Pneumonia Vaccination Status For Older Adults: Patient received any pneumococcal conjugate or polysaccharide vaccine on or after their 60th birthday and before the end of the measurement period
Quality 402: Tobacco Use And Help With Quitting Among Adolescents: Patient screened for tobacco and is an ex-smoker
Quality 130: Documentation Of Current Medications In The Medical Record: Current Medications Documented

## 2023-07-31 NOTE — PROCEDURE: LIQUID NITROGEN
Duration Of Freeze Thaw-Cycle (Seconds): 2
Show Applicator Variable?: Yes
Consent: The patient's consent was obtained including but not limited to risks of crusting, scabbing, blistering, scarring, darker or lighter pigmentary change, recurrence, incomplete removal and infection.
Post-Care Instructions: I reviewed with the patient in detail post-care instructions. Patient is to wear sunprotection, and avoid picking at any of the treated lesions. Pt may apply Vaseline to crusted or scabbing areas.
Number Of Freeze-Thaw Cycles: 2 freeze-thaw cycles
Render Note In Bullet Format When Appropriate: No
Detail Level: Detailed
Application Tool (Optional): Cry-AC

## 2023-07-31 NOTE — CASE COMMUNICATION
I agree with the changes, thanks  ----- Message -----  From: Sanjana Agosto R.N.  Sent: 7/26/2023  11:22 AM PDT  To: Pablo Rodríguez PT      Quality Review for 7.20.23 DC OASIS performed on by NIRANJAN Agosto RN on 7.26.2023     Edits completed by NIRANJAN Agosto RN:  1. Changed  to not assessed/99 as this was an office dc  2. Changed  E to yes per POC

## 2023-07-31 NOTE — PROCEDURE: BIOPSY BY SHAVE METHOD
Detail Level: Detailed
Depth Of Biopsy: dermis
Was A Bandage Applied: Yes
Size Of Lesion In Cm: 0
Biopsy Type: H and E
Biopsy Method: Dermablade
Anesthesia Type: 0.5% lidocaine without epinephrine
Anesthesia Volume In Cc: 0.5
Hemostasis: Aluminum Chloride
Wound Care: Petrolatum
Dressing: Band-Aid
Destruction After The Procedure: No
Type Of Destruction Used: Curettage
Curettage Text: The wound bed was treated with curettage after the biopsy was performed.
Electrodesiccation And Curettage Text: The wound bed was treated with electrodesiccation and curettage after the biopsy was
Lab: [id number]
Lab Facility: 
Consent: Written consent was obtained and risks were reviewed including but not limited to scarring, infection, bleeding, scabbing, incomplete removal, nerve damage and allergy to anesthesia.
Post-Care Instructions: I reviewed with the patient in detail post-care instructions. Patient is to keep the biopsy site dry overnight, and then apply white petrolatum twice daily until healed. Patient may apply hydrogen peroxide soaks to remove any crusting.
Notification Instructions: Patient will be notified of biopsy results. However, patient instructed to call the office if not contacted within 2 weeks.
Billing Type: Third-Party Bill
Information: Selecting Yes will display possible errors in your note based on the variables you have selected. This validation is only offered as a suggestion for you. PLEASE NOTE THAT THE VALIDATION TEXT WILL BE REMOVED WHEN YOU FINALIZE YOUR NOTE. IF YOU WANT TO FAX A PRELIMINARY NOTE YOU WILL NEED TO TOGGLE THIS TO 'NO' IF YOU DO NOT WANT IT IN YOUR FAXED NOTE.
Anesthesia Volume In Cc: 0.4
Anesthesia Volume In Cc: 0.6

## 2023-07-31 NOTE — PROCEDURE: SURGICAL DECISION MAKING
Risk Assessment Explanation (Does Not Render In The Note): Clinical determination of the probability and/or consequences of an event, such as surgery. Clinical assessment of the level of risk is affected by the nature of the event under consideration for the patient. Modifier 57 is used to indicate an Evaluation and Management (E/M) service resulted in the initial decision to perform surgery either the day before a major surgery (90 day global) or the day of a major surgery.
Discussion: We discussed not only the risks of the procedure but also the likely garcia that further treatment will be required to treat lesion. This could involve another visit here to destroy or excise  lesion, a visit to a Mohs or general or plastic surgeon, scarring, limited  activity, cosmetic imperfections.
Initial Decision For Surgery?: Yes
Date Of Surgery - Today Or Tomorrow?: today
Complexity (Necessary For Coding; Major - 90 Day Global With Some Exceptions; Minor - 10 Day Global): minor

## 2023-07-31 NOTE — CASE COMMUNICATION
I agree with the changes, thanks  ----- Message -----  From: Sanjana Agosto R.N.  Sent: 7/31/2023   9:07 AM PDT  To: Pablo Rodríguez PT      Quality Review for 7.20.23 DC OASIS performed on by NIRANJAN Agosto RN on 7.26.2023   Edits completed by NIRANJAN Agosto RN:   1. Changed  to not assessed/99 as this was an office dc   2. Changed  E to yes per POC

## 2023-08-08 ENCOUNTER — TELEPHONE (OUTPATIENT)
Dept: HEALTH INFORMATION MANAGEMENT | Facility: OTHER | Age: 83
End: 2023-08-08
Payer: MEDICARE

## 2023-08-14 ENCOUNTER — HOSPITAL ENCOUNTER (OUTPATIENT)
Dept: RADIOLOGY | Facility: MEDICAL CENTER | Age: 83
End: 2023-08-14
Payer: MEDICARE

## 2023-08-14 DIAGNOSIS — R06.02 SHORTNESS OF BREATH: ICD-10-CM

## 2023-08-14 PROCEDURE — 71046 X-RAY EXAM CHEST 2 VIEWS: CPT

## 2023-10-13 ENCOUNTER — HOSPITAL ENCOUNTER (OUTPATIENT)
Dept: LAB | Facility: MEDICAL CENTER | Age: 83
End: 2023-10-13
Payer: MEDICARE

## 2023-10-13 LAB — TSH SERPL DL<=0.005 MIU/L-ACNC: 2.2 UIU/ML (ref 0.38–5.33)

## 2023-10-13 PROCEDURE — 84443 ASSAY THYROID STIM HORMONE: CPT

## 2023-10-13 PROCEDURE — 36415 COLL VENOUS BLD VENIPUNCTURE: CPT

## 2024-03-08 ENCOUNTER — HOSPITAL ENCOUNTER (OUTPATIENT)
Dept: LAB | Facility: MEDICAL CENTER | Age: 84
End: 2024-03-08
Attending: FAMILY MEDICINE
Payer: MEDICARE

## 2024-03-08 ENCOUNTER — HOSPITAL ENCOUNTER (OUTPATIENT)
Facility: MEDICAL CENTER | Age: 84
End: 2024-03-08
Payer: MEDICARE

## 2024-03-08 LAB
ALBUMIN SERPL BCP-MCNC: 4.7 G/DL (ref 3.2–4.9)
ALBUMIN/GLOB SERPL: 1.7 G/DL
ALP SERPL-CCNC: 103 U/L (ref 30–99)
ALT SERPL-CCNC: 10 U/L (ref 2–50)
AMBIGUOUS DTTM AMBI4: NORMAL
ANION GAP SERPL CALC-SCNC: 11 MMOL/L (ref 7–16)
AST SERPL-CCNC: 15 U/L (ref 12–45)
BASOPHILS # BLD AUTO: 0.6 % (ref 0–1.8)
BASOPHILS # BLD: 0.04 K/UL (ref 0–0.12)
BILIRUB SERPL-MCNC: 0.6 MG/DL (ref 0.1–1.5)
BUN SERPL-MCNC: 9 MG/DL (ref 8–22)
CALCIUM ALBUM COR SERPL-MCNC: 8.7 MG/DL (ref 8.5–10.5)
CALCIUM SERPL-MCNC: 9.3 MG/DL (ref 8.4–10.2)
CHLORIDE SERPL-SCNC: 94 MMOL/L (ref 96–112)
CHOLEST SERPL-MCNC: 215 MG/DL (ref 100–199)
CO2 SERPL-SCNC: 29 MMOL/L (ref 20–33)
CREAT SERPL-MCNC: 0.62 MG/DL (ref 0.5–1.4)
EOSINOPHIL # BLD AUTO: 0.06 K/UL (ref 0–0.51)
EOSINOPHIL NFR BLD: 0.9 % (ref 0–6.9)
ERYTHROCYTE [DISTWIDTH] IN BLOOD BY AUTOMATED COUNT: 48.1 FL (ref 35.9–50)
EST. AVERAGE GLUCOSE BLD GHB EST-MCNC: 108 MG/DL
FASTING STATUS PATIENT QL REPORTED: NORMAL
GFR SERPLBLD CREATININE-BSD FMLA CKD-EPI: 87 ML/MIN/1.73 M 2
GLOBULIN SER CALC-MCNC: 2.8 G/DL (ref 1.9–3.5)
GLUCOSE SERPL-MCNC: 96 MG/DL (ref 65–99)
HBA1C MFR BLD: 5.4 % (ref 4–5.6)
HCT VFR BLD AUTO: 50.2 % (ref 37–47)
HDLC SERPL-MCNC: 75 MG/DL
HGB BLD-MCNC: 16.6 G/DL (ref 12–16)
IMM GRANULOCYTES # BLD AUTO: 0.02 K/UL (ref 0–0.11)
IMM GRANULOCYTES NFR BLD AUTO: 0.3 % (ref 0–0.9)
LDLC SERPL CALC-MCNC: 118 MG/DL
LYMPHOCYTES # BLD AUTO: 1.88 K/UL (ref 1–4.8)
LYMPHOCYTES NFR BLD: 28.1 % (ref 22–41)
MCH RBC QN AUTO: 30.3 PG (ref 27–33)
MCHC RBC AUTO-ENTMCNC: 33.1 G/DL (ref 32.2–35.5)
MCV RBC AUTO: 91.6 FL (ref 81.4–97.8)
MONOCYTES # BLD AUTO: 0.34 K/UL (ref 0–0.85)
MONOCYTES NFR BLD AUTO: 5.1 % (ref 0–13.4)
NEUTROPHILS # BLD AUTO: 4.34 K/UL (ref 1.82–7.42)
NEUTROPHILS NFR BLD: 65 % (ref 44–72)
NRBC # BLD AUTO: 0 K/UL
NRBC BLD-RTO: 0 /100 WBC (ref 0–0.2)
PLATELET # BLD AUTO: 239 K/UL (ref 164–446)
PMV BLD AUTO: 10.5 FL (ref 9–12.9)
POTASSIUM SERPL-SCNC: 4.7 MMOL/L (ref 3.6–5.5)
PROT SERPL-MCNC: 7.5 G/DL (ref 6–8.2)
RBC # BLD AUTO: 5.48 M/UL (ref 4.2–5.4)
SODIUM SERPL-SCNC: 134 MMOL/L (ref 135–145)
T3FREE SERPL-MCNC: 3.86 PG/ML (ref 2–4.4)
T4 FREE SERPL-MCNC: 1.24 NG/DL (ref 0.93–1.7)
THYROPEROXIDASE AB SERPL-ACNC: 21 IU/ML (ref 0–9)
TRIGL SERPL-MCNC: 112 MG/DL (ref 0–149)
TSH SERPL DL<=0.005 MIU/L-ACNC: 0.87 UIU/ML (ref 0.38–5.33)
WBC # BLD AUTO: 6.7 K/UL (ref 4.8–10.8)

## 2024-03-08 PROCEDURE — 36415 COLL VENOUS BLD VENIPUNCTURE: CPT

## 2024-03-08 PROCEDURE — 84481 FREE ASSAY (FT-3): CPT

## 2024-03-08 PROCEDURE — 86376 MICROSOMAL ANTIBODY EACH: CPT

## 2024-03-08 PROCEDURE — 87086 URINE CULTURE/COLONY COUNT: CPT

## 2024-03-08 PROCEDURE — 84443 ASSAY THYROID STIM HORMONE: CPT

## 2024-03-08 PROCEDURE — 83036 HEMOGLOBIN GLYCOSYLATED A1C: CPT

## 2024-03-08 PROCEDURE — 80053 COMPREHEN METABOLIC PANEL: CPT

## 2024-03-08 PROCEDURE — 84439 ASSAY OF FREE THYROXINE: CPT

## 2024-03-08 PROCEDURE — 85025 COMPLETE CBC W/AUTO DIFF WBC: CPT

## 2024-03-08 PROCEDURE — 80061 LIPID PANEL: CPT

## 2024-03-08 PROCEDURE — 87186 SC STD MICRODIL/AGAR DIL: CPT

## 2024-03-08 PROCEDURE — 87077 CULTURE AEROBIC IDENTIFY: CPT

## 2024-03-11 LAB
BACTERIA UR CULT: ABNORMAL
BACTERIA UR CULT: ABNORMAL
SIGNIFICANT IND 70042: ABNORMAL
SITE SITE: ABNORMAL
SOURCE SOURCE: ABNORMAL

## 2024-06-11 ENCOUNTER — TELEPHONE (OUTPATIENT)
Dept: HEALTH INFORMATION MANAGEMENT | Facility: OTHER | Age: 84
End: 2024-06-11
Payer: MEDICARE

## 2024-07-05 ENCOUNTER — OFFICE VISIT (OUTPATIENT)
Dept: URGENT CARE | Facility: CLINIC | Age: 84
End: 2024-07-05
Payer: MEDICARE

## 2024-07-05 ENCOUNTER — APPOINTMENT (OUTPATIENT)
Dept: URGENT CARE | Facility: CLINIC | Age: 84
End: 2024-07-05

## 2024-07-05 VITALS
HEART RATE: 74 BPM | DIASTOLIC BLOOD PRESSURE: 70 MMHG | SYSTOLIC BLOOD PRESSURE: 138 MMHG | TEMPERATURE: 97.5 F | OXYGEN SATURATION: 94 % | WEIGHT: 125 LBS | RESPIRATION RATE: 16 BRPM | BODY MASS INDEX: 21.34 KG/M2 | HEIGHT: 64 IN

## 2024-07-05 DIAGNOSIS — H11.31 SUBCONJUNCTIVAL HEMORRHAGE OF RIGHT EYE: ICD-10-CM

## 2024-07-05 PROCEDURE — 3075F SYST BP GE 130 - 139MM HG: CPT

## 2024-07-05 PROCEDURE — 99212 OFFICE O/P EST SF 10 MIN: CPT

## 2024-07-05 PROCEDURE — 3078F DIAST BP <80 MM HG: CPT

## 2024-07-05 ASSESSMENT — FIBROSIS 4 INDEX: FIB4 SCORE: 1.69

## 2024-07-09 ASSESSMENT — ENCOUNTER SYMPTOMS
EYE PAIN: 0
BLURRED VISION: 0
COUGH: 0
SPUTUM PRODUCTION: 0
SHORTNESS OF BREATH: 0
MYALGIAS: 0
HEADACHES: 0
EYE REDNESS: 1
FEVER: 0
NAUSEA: 0
DIARRHEA: 0
EYE DISCHARGE: 0
WHEEZING: 0
DOUBLE VISION: 0
STRIDOR: 0
VOMITING: 0
SORE THROAT: 0
ABDOMINAL PAIN: 0
CHILLS: 0
PHOTOPHOBIA: 0
DIZZINESS: 0
PALPITATIONS: 0

## 2024-07-09 ASSESSMENT — VISUAL ACUITY: OU: 1

## 2024-08-05 ENCOUNTER — HOSPITAL ENCOUNTER (INPATIENT)
Facility: MEDICAL CENTER | Age: 84
LOS: 2 days | DRG: 177 | End: 2024-08-07
Attending: EMERGENCY MEDICINE | Admitting: HOSPITALIST
Payer: MEDICARE

## 2024-08-05 ENCOUNTER — APPOINTMENT (OUTPATIENT)
Dept: RADIOLOGY | Facility: MEDICAL CENTER | Age: 84
DRG: 177 | End: 2024-08-05
Attending: EMERGENCY MEDICINE
Payer: MEDICARE

## 2024-08-05 DIAGNOSIS — R06.02 SOB (SHORTNESS OF BREATH): ICD-10-CM

## 2024-08-05 DIAGNOSIS — R79.89 ELEVATED BRAIN NATRIURETIC PEPTIDE (BNP) LEVEL: ICD-10-CM

## 2024-08-05 DIAGNOSIS — E87.1 HYPONATREMIA: ICD-10-CM

## 2024-08-05 DIAGNOSIS — U07.1 COVID-19 VIRUS INFECTION: ICD-10-CM

## 2024-08-05 PROBLEM — Z71.89 ACP (ADVANCE CARE PLANNING): Status: ACTIVE | Noted: 2024-08-05

## 2024-08-05 PROBLEM — D75.1 POLYCYTHEMIA: Status: ACTIVE | Noted: 2024-08-05

## 2024-08-05 PROBLEM — E86.0 DEHYDRATION: Status: ACTIVE | Noted: 2024-08-05

## 2024-08-05 LAB
ANION GAP SERPL CALC-SCNC: 14 MMOL/L (ref 7–16)
APPEARANCE UR: CLEAR
BACTERIA #/AREA URNS HPF: ABNORMAL /HPF
BASOPHILS # BLD AUTO: 0.3 % (ref 0–1.8)
BASOPHILS # BLD: 0.01 K/UL (ref 0–0.12)
BILIRUB UR QL STRIP.AUTO: NEGATIVE
BUN SERPL-MCNC: 10 MG/DL (ref 8–22)
CALCIUM SERPL-MCNC: 9.2 MG/DL (ref 8.4–10.2)
CHLORIDE SERPL-SCNC: 87 MMOL/L (ref 96–112)
CO2 SERPL-SCNC: 25 MMOL/L (ref 20–33)
COLOR UR: YELLOW
CREAT SERPL-MCNC: 0.59 MG/DL (ref 0.5–1.4)
D DIMER PPP IA.FEU-MCNC: 0.37 UG/ML (FEU) (ref 0–0.5)
EKG IMPRESSION: NORMAL
EOSINOPHIL # BLD AUTO: 0.01 K/UL (ref 0–0.51)
EOSINOPHIL NFR BLD: 0.3 % (ref 0–6.9)
EPI CELLS #/AREA URNS HPF: ABNORMAL /HPF
ERYTHROCYTE [DISTWIDTH] IN BLOOD BY AUTOMATED COUNT: 47.3 FL (ref 35.9–50)
FLUAV RNA SPEC QL NAA+PROBE: NEGATIVE
FLUBV RNA SPEC QL NAA+PROBE: NEGATIVE
GFR SERPLBLD CREATININE-BSD FMLA CKD-EPI: 88 ML/MIN/1.73 M 2
GLUCOSE SERPL-MCNC: 96 MG/DL (ref 65–99)
GLUCOSE UR STRIP.AUTO-MCNC: NEGATIVE MG/DL
HCT VFR BLD AUTO: 54.3 % (ref 37–47)
HGB BLD-MCNC: 18.1 G/DL (ref 12–16)
IMM GRANULOCYTES # BLD AUTO: 0.01 K/UL (ref 0–0.11)
IMM GRANULOCYTES NFR BLD AUTO: 0.3 % (ref 0–0.9)
INR PPP: 0.87 (ref 0.87–1.13)
KETONES UR STRIP.AUTO-MCNC: 15 MG/DL
LEUKOCYTE ESTERASE UR QL STRIP.AUTO: ABNORMAL
LYMPHOCYTES # BLD AUTO: 1.06 K/UL (ref 1–4.8)
LYMPHOCYTES NFR BLD: 29.7 % (ref 22–41)
MCH RBC QN AUTO: 30.3 PG (ref 27–33)
MCHC RBC AUTO-ENTMCNC: 33.3 G/DL (ref 32.2–35.5)
MCV RBC AUTO: 91 FL (ref 81.4–97.8)
MICRO URNS: ABNORMAL
MONOCYTES # BLD AUTO: 0.33 K/UL (ref 0–0.85)
MONOCYTES NFR BLD AUTO: 9.2 % (ref 0–13.4)
NEUTROPHILS # BLD AUTO: 2.15 K/UL (ref 1.82–7.42)
NEUTROPHILS NFR BLD: 60.2 % (ref 44–72)
NITRITE UR QL STRIP.AUTO: NEGATIVE
NRBC # BLD AUTO: 0 K/UL
NRBC BLD-RTO: 0 /100 WBC (ref 0–0.2)
NT-PROBNP SERPL IA-MCNC: 1200 PG/ML (ref 0–125)
PH UR STRIP.AUTO: 6.5 [PH] (ref 5–8)
PLATELET # BLD AUTO: 200 K/UL (ref 164–446)
PMV BLD AUTO: 10.5 FL (ref 9–12.9)
POTASSIUM SERPL-SCNC: 4.2 MMOL/L (ref 3.6–5.5)
PROT UR QL STRIP: NEGATIVE MG/DL
PROTHROMBIN TIME: 12.3 SEC (ref 12–14.6)
RBC # BLD AUTO: 5.97 M/UL (ref 4.2–5.4)
RBC # URNS HPF: ABNORMAL /HPF
RBC UR QL AUTO: NEGATIVE
RSV RNA SPEC QL NAA+PROBE: NEGATIVE
SARS-COV-2 RNA RESP QL NAA+PROBE: DETECTED
SODIUM SERPL-SCNC: 126 MMOL/L (ref 135–145)
SP GR UR STRIP.AUTO: 1.01
SPECIMEN SOURCE: ABNORMAL
TROPONIN T SERPL-MCNC: 8 NG/L (ref 6–19)
WBC # BLD AUTO: 3.6 K/UL (ref 4.8–10.8)
WBC #/AREA URNS HPF: ABNORMAL /HPF

## 2024-08-05 PROCEDURE — 84484 ASSAY OF TROPONIN QUANT: CPT

## 2024-08-05 PROCEDURE — 85379 FIBRIN DEGRADATION QUANT: CPT

## 2024-08-05 PROCEDURE — 83880 ASSAY OF NATRIURETIC PEPTIDE: CPT

## 2024-08-05 PROCEDURE — 99223 1ST HOSP IP/OBS HIGH 75: CPT | Mod: 25,AI | Performed by: HOSPITALIST

## 2024-08-05 PROCEDURE — 700105 HCHG RX REV CODE 258: Performed by: HOSPITALIST

## 2024-08-05 PROCEDURE — 85025 COMPLETE CBC W/AUTO DIFF WBC: CPT

## 2024-08-05 PROCEDURE — 36415 COLL VENOUS BLD VENIPUNCTURE: CPT

## 2024-08-05 PROCEDURE — 94760 N-INVAS EAR/PLS OXIMETRY 1: CPT

## 2024-08-05 PROCEDURE — 80048 BASIC METABOLIC PNL TOTAL CA: CPT

## 2024-08-05 PROCEDURE — 700111 HCHG RX REV CODE 636 W/ 250 OVERRIDE (IP): Mod: JZ | Performed by: HOSPITALIST

## 2024-08-05 PROCEDURE — A9270 NON-COVERED ITEM OR SERVICE: HCPCS | Performed by: HOSPITALIST

## 2024-08-05 PROCEDURE — 71045 X-RAY EXAM CHEST 1 VIEW: CPT

## 2024-08-05 PROCEDURE — 99285 EMERGENCY DEPT VISIT HI MDM: CPT

## 2024-08-05 PROCEDURE — 99497 ADVNCD CARE PLAN 30 MIN: CPT | Performed by: HOSPITALIST

## 2024-08-05 PROCEDURE — 770001 HCHG ROOM/CARE - MED/SURG/GYN PRIV*

## 2024-08-05 PROCEDURE — 81001 URINALYSIS AUTO W/SCOPE: CPT

## 2024-08-05 PROCEDURE — 0241U HCHG SARS-COV-2 COVID-19 NFCT DS RESP RNA 4 TRGT MIC: CPT

## 2024-08-05 PROCEDURE — 93005 ELECTROCARDIOGRAM TRACING: CPT | Performed by: EMERGENCY MEDICINE

## 2024-08-05 PROCEDURE — 85610 PROTHROMBIN TIME: CPT

## 2024-08-05 PROCEDURE — 700102 HCHG RX REV CODE 250 W/ 637 OVERRIDE(OP): Performed by: HOSPITALIST

## 2024-08-05 RX ORDER — HYDROMORPHONE HYDROCHLORIDE 1 MG/ML
0.25 INJECTION, SOLUTION INTRAMUSCULAR; INTRAVENOUS; SUBCUTANEOUS
Status: DISCONTINUED | OUTPATIENT
Start: 2024-08-05 | End: 2024-08-07 | Stop reason: HOSPADM

## 2024-08-05 RX ORDER — ALBUTEROL SULFATE 90 UG/1
2 AEROSOL, METERED RESPIRATORY (INHALATION)
Status: DISCONTINUED | OUTPATIENT
Start: 2024-08-05 | End: 2024-08-05

## 2024-08-05 RX ORDER — ALBUTEROL SULFATE 90 UG/1
2 AEROSOL, METERED RESPIRATORY (INHALATION) EVERY 4 HOURS PRN
Status: DISCONTINUED | OUTPATIENT
Start: 2024-08-05 | End: 2024-08-07 | Stop reason: HOSPADM

## 2024-08-05 RX ORDER — METHYLPREDNISOLONE SODIUM SUCCINATE 40 MG/ML
40 INJECTION, POWDER, LYOPHILIZED, FOR SOLUTION INTRAMUSCULAR; INTRAVENOUS EVERY 12 HOURS
Status: COMPLETED | OUTPATIENT
Start: 2024-08-05 | End: 2024-08-06

## 2024-08-05 RX ORDER — OXYCODONE HYDROCHLORIDE 5 MG/1
5 TABLET ORAL
Status: DISCONTINUED | OUTPATIENT
Start: 2024-08-05 | End: 2024-08-07 | Stop reason: HOSPADM

## 2024-08-05 RX ORDER — OXYCODONE HYDROCHLORIDE 5 MG/1
2.5 TABLET ORAL
Status: DISCONTINUED | OUTPATIENT
Start: 2024-08-05 | End: 2024-08-07 | Stop reason: HOSPADM

## 2024-08-05 RX ORDER — ALBUTEROL SULFATE 90 UG/1
2 AEROSOL, METERED RESPIRATORY (INHALATION) ONCE
Status: DISCONTINUED | OUTPATIENT
Start: 2024-08-05 | End: 2024-08-05

## 2024-08-05 RX ORDER — POLYETHYLENE GLYCOL 3350 17 G/17G
1 POWDER, FOR SOLUTION ORAL
Status: DISCONTINUED | OUTPATIENT
Start: 2024-08-05 | End: 2024-08-07 | Stop reason: HOSPADM

## 2024-08-05 RX ORDER — IPRATROPIUM BROMIDE AND ALBUTEROL SULFATE 2.5; .5 MG/3ML; MG/3ML
3 SOLUTION RESPIRATORY (INHALATION)
Status: DISCONTINUED | OUTPATIENT
Start: 2024-08-05 | End: 2024-08-05

## 2024-08-05 RX ORDER — ACETAMINOPHEN 325 MG/1
650 TABLET ORAL EVERY 6 HOURS PRN
Status: DISCONTINUED | OUTPATIENT
Start: 2024-08-05 | End: 2024-08-07 | Stop reason: HOSPADM

## 2024-08-05 RX ORDER — VITAMIN B COMPLEX
5000 TABLET ORAL EVERY EVENING
Status: DISCONTINUED | OUTPATIENT
Start: 2024-08-05 | End: 2024-08-07 | Stop reason: HOSPADM

## 2024-08-05 RX ORDER — THYROID 30 MG/1
45 TABLET ORAL DAILY
Status: DISCONTINUED | OUTPATIENT
Start: 2024-08-06 | End: 2024-08-07 | Stop reason: HOSPADM

## 2024-08-05 RX ORDER — SODIUM CHLORIDE 9 MG/ML
INJECTION, SOLUTION INTRAVENOUS CONTINUOUS
Status: ACTIVE | OUTPATIENT
Start: 2024-08-05 | End: 2024-08-06

## 2024-08-05 RX ORDER — AMOXICILLIN 250 MG
2 CAPSULE ORAL 2 TIMES DAILY
Status: DISCONTINUED | OUTPATIENT
Start: 2024-08-05 | End: 2024-08-07 | Stop reason: HOSPADM

## 2024-08-05 RX ADMIN — Medication 5000 UNITS: at 20:42

## 2024-08-05 RX ADMIN — SENNOSIDES AND DOCUSATE SODIUM 2 TABLET: 50; 8.6 TABLET ORAL at 20:42

## 2024-08-05 RX ADMIN — SODIUM CHLORIDE: 9 INJECTION, SOLUTION INTRAVENOUS at 20:29

## 2024-08-05 RX ADMIN — METHYLPREDNISOLONE SODIUM SUCCINATE 40 MG: 40 INJECTION, POWDER, FOR SOLUTION INTRAMUSCULAR; INTRAVENOUS at 20:45

## 2024-08-05 SDOH — ECONOMIC STABILITY: TRANSPORTATION INSECURITY
IN THE PAST 12 MONTHS, HAS LACK OF RELIABLE TRANSPORTATION KEPT YOU FROM MEDICAL APPOINTMENTS, MEETINGS, WORK OR FROM GETTING THINGS NEEDED FOR DAILY LIVING?: NO

## 2024-08-05 SDOH — ECONOMIC STABILITY: TRANSPORTATION INSECURITY
IN THE PAST 12 MONTHS, HAS THE LACK OF TRANSPORTATION KEPT YOU FROM MEDICAL APPOINTMENTS OR FROM GETTING MEDICATIONS?: NO

## 2024-08-05 ASSESSMENT — COPD QUESTIONNAIRES
DO YOU EVER COUGH UP ANY MUCUS OR PHLEGM?: YES, A FEW DAYS A WEEK OR MONTH
COPD SCREENING SCORE: 7
HAVE YOU SMOKED AT LEAST 100 CIGARETTES IN YOUR ENTIRE LIFE: YES
DURING THE PAST 4 WEEKS HOW MUCH DID YOU FEEL SHORT OF BREATH: SOME OF THE TIME

## 2024-08-05 ASSESSMENT — LIFESTYLE VARIABLES
ON A TYPICAL DAY WHEN YOU DRINK ALCOHOL HOW MANY DRINKS DO YOU HAVE: 0
EVER HAD A DRINK FIRST THING IN THE MORNING TO STEADY YOUR NERVES TO GET RID OF A HANGOVER: NO
EVER FELT BAD OR GUILTY ABOUT YOUR DRINKING: NO
ALCOHOL_USE: NO
TOTAL SCORE: 0
AVERAGE NUMBER OF DAYS PER WEEK YOU HAVE A DRINK CONTAINING ALCOHOL: 0
TOTAL SCORE: 0
HAVE PEOPLE ANNOYED YOU BY CRITICIZING YOUR DRINKING: NO
HOW MANY TIMES IN THE PAST YEAR HAVE YOU HAD 5 OR MORE DRINKS IN A DAY: 0
TOTAL SCORE: 0
HAVE YOU EVER FELT YOU SHOULD CUT DOWN ON YOUR DRINKING: NO
CONSUMPTION TOTAL: NEGATIVE

## 2024-08-05 ASSESSMENT — COGNITIVE AND FUNCTIONAL STATUS - GENERAL
STANDING UP FROM CHAIR USING ARMS: TOTAL
SUGGESTED CMS G CODE MODIFIER DAILY ACTIVITY: CK
DRESSING REGULAR LOWER BODY CLOTHING: A LOT
PERSONAL GROOMING: A LITTLE
DRESSING REGULAR UPPER BODY CLOTHING: A LOT
WALKING IN HOSPITAL ROOM: TOTAL
MOBILITY SCORE: 10
CLIMB 3 TO 5 STEPS WITH RAILING: TOTAL
MOVING TO AND FROM BED TO CHAIR: A LOT
HELP NEEDED FOR BATHING: A LOT
SUGGESTED CMS G CODE MODIFIER MOBILITY: CL
EATING MEALS: A LITTLE
MOVING FROM LYING ON BACK TO SITTING ON SIDE OF FLAT BED: A LOT
TURNING FROM BACK TO SIDE WHILE IN FLAT BAD: A LITTLE
TOILETING: A LOT
DAILY ACTIVITIY SCORE: 14

## 2024-08-05 ASSESSMENT — ENCOUNTER SYMPTOMS
FEVER: 0
EYE DISCHARGE: 0
BRUISES/BLEEDS EASILY: 0
COUGH: 0
NERVOUS/ANXIOUS: 0
FOCAL WEAKNESS: 0
SHORTNESS OF BREATH: 1
FLANK PAIN: 0
STRIDOR: 0
EYE REDNESS: 0
VOMITING: 0
CHILLS: 1
ABDOMINAL PAIN: 0
MYALGIAS: 1

## 2024-08-05 ASSESSMENT — FIBROSIS 4 INDEX: FIB4 SCORE: 1.69

## 2024-08-05 ASSESSMENT — SOCIAL DETERMINANTS OF HEALTH (SDOH)
WITHIN THE PAST 12 MONTHS, THE FOOD YOU BOUGHT JUST DIDN'T LAST AND YOU DIDN'T HAVE MONEY TO GET MORE: NEVER TRUE
IN THE PAST 12 MONTHS, HAS THE ELECTRIC, GAS, OIL, OR WATER COMPANY THREATENED TO SHUT OFF SERVICE IN YOUR HOME?: NO
WITHIN THE LAST YEAR, HAVE TO BEEN RAPED OR FORCED TO HAVE ANY KIND OF SEXUAL ACTIVITY BY YOUR PARTNER OR EX-PARTNER?: NO
WITHIN THE LAST YEAR, HAVE YOU BEEN HUMILIATED OR EMOTIONALLY ABUSED IN OTHER WAYS BY YOUR PARTNER OR EX-PARTNER?: NO
WITHIN THE LAST YEAR, HAVE YOU BEEN KICKED, HIT, SLAPPED, OR OTHERWISE PHYSICALLY HURT BY YOUR PARTNER OR EX-PARTNER?: NO
WITHIN THE PAST 12 MONTHS, YOU WORRIED THAT YOUR FOOD WOULD RUN OUT BEFORE YOU GOT THE MONEY TO BUY MORE: NEVER TRUE
WITHIN THE LAST YEAR, HAVE YOU BEEN AFRAID OF YOUR PARTNER OR EX-PARTNER?: NO

## 2024-08-05 ASSESSMENT — PATIENT HEALTH QUESTIONNAIRE - PHQ9
1. LITTLE INTEREST OR PLEASURE IN DOING THINGS: NOT AT ALL
2. FEELING DOWN, DEPRESSED, IRRITABLE, OR HOPELESS: NOT AT ALL
SUM OF ALL RESPONSES TO PHQ9 QUESTIONS 1 AND 2: 0

## 2024-08-05 ASSESSMENT — PAIN DESCRIPTION - PAIN TYPE: TYPE: ACUTE PAIN

## 2024-08-05 NOTE — ED TRIAGE NOTES
Norah Mchugh  84 y.o.  Chief Complaint   Patient presents with    Shortness of Breath     Pt to triage by wheelchair with daughter.  Pt had COVID and was feeling better, but woke up short of breath today.  Pt has a history of emphysema.  Pt does not wear oxygen at home.  O2 sats in triage are 90%.  Pt able to speak in full sentences.

## 2024-08-05 NOTE — ED NOTES
"Daughter asking about wait times.  She states pt usually eats about now.  Pt is unable to stand and only likes to use the restroom at home and may need to use the restroom soon.  Apparently the daughter called the \"[phone] number\" and was told there was only a 13 minute wait.  Apologized for the miscommunication from the person she spoke with and apologized for the wait time.  "

## 2024-08-06 LAB
ALBUMIN SERPL BCP-MCNC: 3.5 G/DL (ref 3.2–4.9)
ALBUMIN/GLOB SERPL: 1.4 G/DL
ALP SERPL-CCNC: 80 U/L (ref 30–99)
ALT SERPL-CCNC: 11 U/L (ref 2–50)
ANION GAP SERPL CALC-SCNC: 13 MMOL/L (ref 7–16)
AST SERPL-CCNC: 19 U/L (ref 12–45)
BILIRUB SERPL-MCNC: 0.3 MG/DL (ref 0.1–1.5)
BUN SERPL-MCNC: 8 MG/DL (ref 8–22)
CALCIUM ALBUM COR SERPL-MCNC: 8.6 MG/DL (ref 8.5–10.5)
CALCIUM SERPL-MCNC: 8.2 MG/DL (ref 8.4–10.2)
CHLORIDE SERPL-SCNC: 95 MMOL/L (ref 96–112)
CO2 SERPL-SCNC: 22 MMOL/L (ref 20–33)
CREAT SERPL-MCNC: 0.33 MG/DL (ref 0.5–1.4)
ERYTHROCYTE [DISTWIDTH] IN BLOOD BY AUTOMATED COUNT: 46.2 FL (ref 35.9–50)
GFR SERPLBLD CREATININE-BSD FMLA CKD-EPI: 101 ML/MIN/1.73 M 2
GLOBULIN SER CALC-MCNC: 2.5 G/DL (ref 1.9–3.5)
GLUCOSE SERPL-MCNC: 131 MG/DL (ref 65–99)
HCT VFR BLD AUTO: 48.5 % (ref 37–47)
HGB BLD-MCNC: 16.3 G/DL (ref 12–16)
MAGNESIUM SERPL-MCNC: 1.6 MG/DL (ref 1.5–2.5)
MCH RBC QN AUTO: 30.2 PG (ref 27–33)
MCHC RBC AUTO-ENTMCNC: 33.6 G/DL (ref 32.2–35.5)
MCV RBC AUTO: 90 FL (ref 81.4–97.8)
PLATELET # BLD AUTO: 162 K/UL (ref 164–446)
PMV BLD AUTO: 10.9 FL (ref 9–12.9)
POTASSIUM SERPL-SCNC: 4 MMOL/L (ref 3.6–5.5)
PROT SERPL-MCNC: 6 G/DL (ref 6–8.2)
RBC # BLD AUTO: 5.39 M/UL (ref 4.2–5.4)
SODIUM SERPL-SCNC: 130 MMOL/L (ref 135–145)
WBC # BLD AUTO: 2.2 K/UL (ref 4.8–10.8)

## 2024-08-06 PROCEDURE — 83735 ASSAY OF MAGNESIUM: CPT

## 2024-08-06 PROCEDURE — 700111 HCHG RX REV CODE 636 W/ 250 OVERRIDE (IP): Performed by: INTERNAL MEDICINE

## 2024-08-06 PROCEDURE — 80053 COMPREHEN METABOLIC PANEL: CPT

## 2024-08-06 PROCEDURE — 700102 HCHG RX REV CODE 250 W/ 637 OVERRIDE(OP): Performed by: HOSPITALIST

## 2024-08-06 PROCEDURE — 700111 HCHG RX REV CODE 636 W/ 250 OVERRIDE (IP): Mod: JZ | Performed by: HOSPITALIST

## 2024-08-06 PROCEDURE — 700102 HCHG RX REV CODE 250 W/ 637 OVERRIDE(OP): Performed by: INTERNAL MEDICINE

## 2024-08-06 PROCEDURE — A9270 NON-COVERED ITEM OR SERVICE: HCPCS | Performed by: INTERNAL MEDICINE

## 2024-08-06 PROCEDURE — 99232 SBSQ HOSP IP/OBS MODERATE 35: CPT | Performed by: INTERNAL MEDICINE

## 2024-08-06 PROCEDURE — 770001 HCHG ROOM/CARE - MED/SURG/GYN PRIV*

## 2024-08-06 PROCEDURE — A9270 NON-COVERED ITEM OR SERVICE: HCPCS | Performed by: HOSPITALIST

## 2024-08-06 PROCEDURE — 85027 COMPLETE CBC AUTOMATED: CPT

## 2024-08-06 PROCEDURE — 94760 N-INVAS EAR/PLS OXIMETRY 1: CPT

## 2024-08-06 PROCEDURE — 36415 COLL VENOUS BLD VENIPUNCTURE: CPT

## 2024-08-06 RX ORDER — HYDRALAZINE HYDROCHLORIDE 20 MG/ML
10 INJECTION INTRAMUSCULAR; INTRAVENOUS ONCE
Status: COMPLETED | OUTPATIENT
Start: 2024-08-06 | End: 2024-08-06

## 2024-08-06 RX ORDER — GUAIFENESIN 600 MG/1
600 TABLET, EXTENDED RELEASE ORAL EVERY 12 HOURS
Status: DISCONTINUED | OUTPATIENT
Start: 2024-08-06 | End: 2024-08-07 | Stop reason: HOSPADM

## 2024-08-06 RX ORDER — MAGNESIUM SULFATE HEPTAHYDRATE 40 MG/ML
2 INJECTION, SOLUTION INTRAVENOUS ONCE
Status: COMPLETED | OUTPATIENT
Start: 2024-08-06 | End: 2024-08-06

## 2024-08-06 RX ADMIN — METHYLPREDNISOLONE SODIUM SUCCINATE 40 MG: 40 INJECTION, POWDER, FOR SOLUTION INTRAMUSCULAR; INTRAVENOUS at 17:25

## 2024-08-06 RX ADMIN — MAGNESIUM SULFATE HEPTAHYDRATE 2 G: 2 INJECTION, SOLUTION INTRAVENOUS at 08:06

## 2024-08-06 RX ADMIN — GUAIFENESIN 600 MG: 600 TABLET, EXTENDED RELEASE ORAL at 17:25

## 2024-08-06 RX ADMIN — THYROID 45 MG: 30 TABLET ORAL at 07:59

## 2024-08-06 RX ADMIN — Medication 5000 UNITS: at 17:25

## 2024-08-06 RX ADMIN — SENNOSIDES AND DOCUSATE SODIUM 2 TABLET: 50; 8.6 TABLET ORAL at 07:59

## 2024-08-06 RX ADMIN — HYDRALAZINE HYDROCHLORIDE 10 MG: 20 INJECTION INTRAMUSCULAR; INTRAVENOUS at 23:15

## 2024-08-06 RX ADMIN — RIVAROXABAN 10 MG: 10 TABLET, FILM COATED ORAL at 17:25

## 2024-08-06 RX ADMIN — METHYLPREDNISOLONE SODIUM SUCCINATE 40 MG: 40 INJECTION, POWDER, FOR SOLUTION INTRAMUSCULAR; INTRAVENOUS at 05:52

## 2024-08-06 RX ADMIN — GUAIFENESIN 600 MG: 600 TABLET, EXTENDED RELEASE ORAL at 11:51

## 2024-08-06 ASSESSMENT — ENCOUNTER SYMPTOMS
HEADACHES: 0
SORE THROAT: 0
MYALGIAS: 0
SHORTNESS OF BREATH: 1
CHILLS: 0
VOMITING: 0
DEPRESSION: 0
COUGH: 1
DIARRHEA: 0
BACK PAIN: 0
FEVER: 0
PALPITATIONS: 0
DIZZINESS: 0
WEAKNESS: 0
SPUTUM PRODUCTION: 1
HEARTBURN: 0
HALLUCINATIONS: 0
BLOOD IN STOOL: 0
NAUSEA: 0
ABDOMINAL PAIN: 0
FOCAL WEAKNESS: 0

## 2024-08-06 ASSESSMENT — PATIENT HEALTH QUESTIONNAIRE - PHQ9
SUM OF ALL RESPONSES TO PHQ9 QUESTIONS 1 AND 2: 0
1. LITTLE INTEREST OR PLEASURE IN DOING THINGS: NOT AT ALL
2. FEELING DOWN, DEPRESSED, IRRITABLE, OR HOPELESS: NOT AT ALL

## 2024-08-06 ASSESSMENT — FIBROSIS 4 INDEX: FIB4 SCORE: 3.01

## 2024-08-06 ASSESSMENT — PAIN DESCRIPTION - PAIN TYPE
TYPE: ACUTE PAIN
TYPE: ACUTE PAIN

## 2024-08-06 NOTE — ED NOTES
Medication history reviewed with patient. Med rec is complete.     Allergies reviewed.     Patient denies any outpatient antibiotics in the last 30 days.   Anticoagulants (rivaroxaban, apixaban, edoxaban, dabigatran, enoxaparin) taken in the last 14 days? No    Asia MojicaD, BCPS

## 2024-08-06 NOTE — ASSESSMENT & PLAN NOTE
Likely secondary to reduced oral intake, and increase in insensible loss due to infection.  Continue fluids  Repeat sodium in a.m.

## 2024-08-06 NOTE — PROGRESS NOTES
Received bedside report from NOC RN, assumed care of patient. Patient is resting in bed comfortably, chest rise and fall observed, no visible signs of distress at this time. 96% on 2L oxygen via nasal canula. Call light within reach, bed locked in lowest position with bed alarm active.

## 2024-08-06 NOTE — ASSESSMENT & PLAN NOTE
Scattered wheezing has improved  Complete IV steroids today, changed to p.o. in a.m.  She has been unable to wean from oxygen, saturation on room air is 88%  Oxygen as needed, Respiratory protocol, Bronchodilators, Incentive spirometry

## 2024-08-06 NOTE — H&P
Hospital Medicine History & Physical Note    Date of Service  8/5/2024    Primary Care Physician  Guicho HANEY M.D.    Consultants  None      Code Status  Full Code    Chief Complaint  Chief Complaint   Patient presents with    Shortness of Breath     History of Presenting Illness  Norah Mchugh is a 84 y.o. female with a past medical history of chronic obstructive pulmonary disease not on oxygen at baseline, glucoma, hypothyroidism who presented 8/5/2024 with generalized weakness, easy fatigability and shortness of breath.  Patient reports that she has not been feeling well since last week.  She has been having progressive worsening generalized weakness, shortness of breath.  She tested positive for COVID-19 infection.  She denies noticing extremity pain redness or swelling.     I discussed the plan of care with emergency physician, and the patient    Review of Systems  Review of Systems   Constitutional:  Positive for chills and malaise/fatigue. Negative for fever.   Eyes:  Negative for discharge and redness.   Respiratory:  Positive for shortness of breath. Negative for cough and stridor.    Cardiovascular:  Negative for chest pain and leg swelling.   Gastrointestinal:  Negative for abdominal pain and vomiting.   Genitourinary:  Negative for flank pain.   Musculoskeletal:  Positive for myalgias.   Skin: Negative.    Neurological:  Negative for focal weakness.   Endo/Heme/Allergies:  Does not bruise/bleed easily.   Psychiatric/Behavioral:  The patient is not nervous/anxious.      Past Medical History   has a past medical history of CATARACT, Glaucoma, Hypertension, Pain (05-18-12), Polio, and Unspecified disorder of thyroid.    Surgical History   has a past surgical history that includes joint injection diagnostic (5/24/2012); other; bunionectomy; other orthopedic surgery (2007); other orthopedic surgery (2002); incision and drainage orthopedic (8/30/2012); cataract phaco with iol (2/26/2013);  and hip arthroplasty total (7/26/2012).     Family History  family history includes Cancer in her sister; Heart Disease in her mother; Other in her brother and father; Psychiatric Illness in her brother and father.      Social History   reports that she quit smoking about 52 years ago. Her smoking use included cigarettes. She started smoking about 72 years ago. She has a 40 pack-year smoking history. She has never used smokeless tobacco. She reports that she does not currently use alcohol after a past usage of about 8.4 oz of alcohol per week. She reports that she does not use drugs.    Allergies  Allergies   Allergen Reactions    Milk Protein Nausea    Other Drug Nausea     An antibiotic, name unknown     Medications  Prior to Admission Medications   Prescriptions Last Dose Informant Patient Reported? Taking?   Cholecalciferol (VITAMIN D3) 5000 UNIT TABS   Yes No   Sig: Take  by mouth every day. Indications: Vitamin D Deficiency   thyroid (ARMOUR THYROID) 15 MG Tab   Yes No   Sig: Take 3 Tablets by mouth every day. Indications: Underactive Thyroid      Facility-Administered Medications: None     Physical Exam  Temp:  [36.8 °C (98.2 °F)] 36.8 °C (98.2 °F)  Pulse:  [69-83] 69  Resp:  [18] 18  BP: (153)/(63) 153/63  SpO2:  [90 %] 90 %  Blood Pressure : (!) 153/63   Temperature: 36.8 °C (98.2 °F)   Pulse: 83   Respiration: 18   Pulse Oximetry: 90 % (89-90%)     Physical Exam  Constitutional:       General: She is not in acute distress.  HENT:      Head: Normocephalic and atraumatic.      Right Ear: External ear normal.      Left Ear: External ear normal.      Nose: No congestion or rhinorrhea.      Mouth/Throat:      Mouth: Mucous membranes are moist.      Pharynx: No oropharyngeal exudate or posterior oropharyngeal erythema.   Eyes:      General: No scleral icterus.        Right eye: No discharge.         Left eye: No discharge.      Conjunctiva/sclera: Conjunctivae normal.      Pupils: Pupils are equal, round, and  reactive to light.   Cardiovascular:      Rate and Rhythm: Normal rate and regular rhythm.      Heart sounds:      No friction rub. No gallop.   Pulmonary:      Breath sounds: Wheezing present.      Comments: Saturating well on room air.  Is able to speak in full sentences.  Bilateral wheezing.  Abdominal:      General: There is no distension.      Tenderness: There is no guarding.   Musculoskeletal:         General: No swelling.      Cervical back: Neck supple. No rigidity. No muscular tenderness.      Right lower leg: No edema.      Left lower leg: No edema.   Skin:     Capillary Refill: Capillary refill takes 2 to 3 seconds.      Coloration: Skin is not jaundiced or pale.      Findings: No bruising or erythema.   Neurological:      Mental Status: She is alert and oriented to person, place, and time.   Psychiatric:         Mood and Affect: Mood normal.         Judgment: Judgment normal.       Laboratory:  Recent Labs     08/05/24  1728   WBC 3.6*   RBC 5.97*   HEMOGLOBIN 18.1*   HEMATOCRIT 54.3*   MCV 91.0   MCH 30.3   MCHC 33.3   RDW 47.3   PLATELETCT 200   MPV 10.5     Recent Labs     08/05/24  1728   SODIUM 126*   POTASSIUM 4.2   CHLORIDE 87*   CO2 25   GLUCOSE 96   BUN 10   CREATININE 0.59   CALCIUM 9.2     Recent Labs     08/05/24  1728   GLUCOSE 96     Recent Labs     08/05/24  1728   INR 0.87     Recent Labs     08/05/24  1728   NTPROBNP 1200*         Recent Labs     08/05/24  1728   TROPONINT 8     Imaging:  DX-CHEST-PORTABLE (1 VIEW)   Final Result      1.  Hyperinflation consistent with COPD   2.  No pneumonia or pneumothorax.        I patient reviewed patient EKG shows sinus rhythm with a rate of 83.  There is no ST elevation.  There are T wave inversions in lead aVL.  QTc is 443.    Assessment/Plan:  Justification for Admission Status  I anticipate this patient will require at least two midnights for appropriate medical management, necessitating inpatient admission because patient has multiple acute  medical problems including hyponatremia and COVID-pneumonia, wheezing and bronchospasm..  Will require intravenous fluids, breathing treatment and steroids.    Patient will need a Med/Surg bed on MEDICAL service     * Hyponatremia- (present on admission)  Assessment & Plan  Hypovolemic hyponatremia  I expect Na to improve with IVF.  Judicious use in the setting of COVID-19 infection    COVID-19 virus infection- (present on admission)  Assessment & Plan  The patient does have scattered wheeze and bronchospasm.  I will start intravenous steroids  Encourage prone positioning. Oxygen as needed, Incentive spirometry    Polycythemia- (present on admission)  Assessment & Plan  Multifactorial secondary to emphysema and dehydration  Anticipate improvement with intravenous fluids.  Consider therapeutic phlebotomy according to follow-up results  I will order follow-up CBC    Dehydration- (present on admission)  Assessment & Plan  Likely secondary to reduced oral intake, and increase in insensible loss due to infection.  Encourage oral intake as tolerated, antiemetics as needed.  Judicious use of intravenous hydration until adequate oral intake is achieved.     Other emphysema (HCC)- (present on admission)  Assessment & Plan  Has scattered wheezing.  I will order short course of steroids  Oxygen as needed, Respiratory protocol, Bronchodilators, Incentive spirometry    ACP (advance care planning)- (present on admission)  Assessment & Plan  I had a discussion with the patient regarding goals of care, diagnoses, prognosis, and CODE STATUS. We discussed her prognosis and comorbidities.  The patient has advanced age of 84 years.  She has relatively few comorbid conditions including emphysema and is presenting with hyponatremia and COVID-19 pneumonia.  At this point the patient wants to maintain a full code.  She is open to all forms of invasive and noninvasive diagnostic and therapeutic interventions.      Acquired hypothyroidism-  (present on admission)  Assessment & Plan  I will resume home thyroid medications      VTE prophylaxis: SCDs/TEDs and Xarelto 10 mg daily as prophylaxis    I had a discussion with the patient regarding goals of care, diagnoses, prognosis, and CODE STATUS. We discussed her prognosis and comorbidities.  The patient has advanced age of 84 years.  She has relatively few comorbid conditions including emphysema and is presenting with hyponatremia and COVID-19 pneumonia.  At this point the patient wants to maintain a full code.  She is open to all forms of invasive and noninvasive diagnostic and therapeutic interventions. I spent 16 minutes on advanced care planning

## 2024-08-06 NOTE — CARE PLAN
The patient is Stable - Low risk of patient condition declining or worsening    Shift Goals  Clinical Goals: Pt's SBP will remain <160mmhg; Continue pt on IV fluids and IV steroids  Patient Goals: Go home tomorrow  Family Goals: NA    Progress made toward(s) clinical / shift goals:  Pt's latest /70; continued on IV fluids and IV solumedrol; Latest serum Na level is 130. Progressing in other goals.      Problem: Knowledge Deficit - Standard  Goal: Patient and family/care givers will demonstrate understanding of plan of care, disease process/condition, diagnostic tests and medications  Outcome: Progressing     Problem: Knowledge Deficit - COPD  Goal: Patient/significant other demonstrates understanding of disease process, utilization of the Action Plan, medications and discharge instruction  Outcome: Progressing     Problem: Risk for Infection - COPD  Goal: Patient will remain free from signs and symptoms of infection  Outcome: Progressing     Problem: Nutrition - Advanced  Goal: Patient will display progressive weight gain toward goal have adequate food and fluid intake  Outcome: Progressing     Problem: Ineffective Airway Clearance  Goal: Patient will maintain patent airway with clear/clearing breath sounds  Outcome: Progressing     Problem: Impaired Gas Exchange  Goal: Patient will demonstrate improved ventilation and adequate oxygenation and participate in treatment regimen within the level of ability/situation.  Outcome: Progressing     Problem: Self Care  Goal: Patient will have the ability to perform ADLs independently or with assistance (bathe, groom, dress, toilet and feed)  Outcome: Progressing     Problem: Skin Integrity  Goal: Skin integrity is maintained or improved  Outcome: Progressing     Problem: Fall Risk  Goal: Patient will remain free from falls  Outcome: Progressing     Patient is not progressing towards the following goals: NA

## 2024-08-06 NOTE — ASSESSMENT & PLAN NOTE
Hypovolemic hyponatremia, possibly some component of SIADH related to COVID  Sodium is improving  Continue IVF  Repeat in a.m.

## 2024-08-06 NOTE — ED NOTES
Pt updated on POC by ERP. Blood work/ Nasal swab collected and and sent to lab. No questions at this time. Daughter at bedside.

## 2024-08-06 NOTE — PROGRESS NOTES
Pt arrived via [facility], admitted to room 204, transferred from [facility] to bed using slide board. Pt is A&Ox4, on RA, denies pain at this time. Pt oriented to room and call light. POC reviewed with pt and family. Fall precautions in place, bed at lowest position. Hourly rounding in place.

## 2024-08-06 NOTE — DISCHARGE PLANNING
Case Management Discharge Planning    Admission Date: 8/5/2024  GMLOS: 5  ALOS: 1    6-Clicks ADL Score: 14  6-Clicks Mobility Score: 10  PT and/or OT Eval ordered: No  Post-acute Referrals Ordered: No  Post-acute Choice Obtained: NA  Has referral(s) been sent to post-acute provider:  [facility]      Anticipated Discharge Dispo: Discharge Disposition: Discharged to home/self care (01)  Discharge Address: [id number]  Discharge Contact Phone Number: [phone]    DME Needed: No    Action(s) Taken: DC Assessment Complete (See below)    Pt is in wheelchair at baseline, no dc needs identified. Per  pt will potentially be able to discharge today.     Escalations Completed: None    Medically Clear: No    Next Steps: LSW to follow    Barriers to Discharge: Medical clearance    Is the patient up for discharge tomorrow: No          Care Transition Team Assessment    LSW met with pt at bedside to complete discharge planning assessment.     Pt reported she lives with her daughter and granddaughter.   Pt's preferred pharmacy is [facility].   Pt is wheelchair bound.     Information Source  Orientation Level: Oriented X4  Information Given By: Patient  Who is responsible for making decisions for patient? : Patient    Readmission Evaluation  Is this a readmission?: No    Elopement Risk  Legal Hold: No  Ambulatory or Self Mobile in Wheelchair: No-Not an Elopement Risk  Elopement Risk: At Risk for Elopement    Interdisciplinary Discharge Planning  Lives with - Patient's Self Care Capacity: Adult Children, Related Adult  Patient or legal guardian wants to designate a caregiver: No  Support Systems: Family Member(s) (grandson and daughter)  Housing / Facility: [address] [facility]    Discharge Preparedness  What is your plan after discharge?: Home with help  What are your discharge supports?: Child  Prior Functional Level: Uses Wheelchair, Needs Assist with Activities of Daily Living  Difficulity with ADLs: Walking, Bathing,  Dressing  Difficulity with IADLs: Driving    Functional Assesment  Prior Functional Level: Uses Wheelchair, Needs Assist with Activities of Daily Living    Finances  Financial Barriers to Discharge: No  Prescription Coverage: Yes    Vision / Hearing Impairment  Vision Impairment : Yes  Right Eye Vision: Wears Glasses  Left Eye Vision: Wears Glasses  Hearing Impairment : Yes  Hearing Impairment: Both Ears, Hearing Device(s) Available  Does Pt Need Special Equipment for the Hearing Impaired?: No    Advance Directive  Advance Directive?: DPOA for Health Care    Domestic Abuse  Possible Abuse/Neglect Reported to:: Not Applicable    Psychological Assessment  History of Substance Abuse: None  History of Psychiatric Problems: No  Non-compliant with Treatment: No  Newly Diagnosed Illness: No    Discharge Risks or Barriers  Discharge risks or barriers?: No    Anticipated Discharge Information  Discharge Disposition: Discharged to home/self care (01)  Discharge Address: [facility] [address]  Discharge Contact Phone Number: [phone]

## 2024-08-06 NOTE — ASSESSMENT & PLAN NOTE
The patient does have scattered wheeze and bronchospasm.   Continue IV Solu-Medrol given that she does have COPD, we need to prednisone in a.m. if she is able to come off oxygen  Isolation

## 2024-08-06 NOTE — ED NOTES
Pt urine sample collected and sent to lab. Daughter assisted in use of a bedpan. Pt transferred from wheelchair to bed.

## 2024-08-06 NOTE — ASSESSMENT & PLAN NOTE
I had a discussion with the patient regarding goals of care, diagnoses, prognosis, and CODE STATUS. We discussed her prognosis and comorbidities.  The patient has advanced age of 84 years.  She has relatively few comorbid conditions including emphysema and is presenting with hyponatremia and COVID-19 pneumonia.  At this point the patient wants to maintain a full code.  She is open to all forms of invasive and noninvasive diagnostic and therapeutic interventions.

## 2024-08-06 NOTE — PROGRESS NOTES
4 Eyes Skin Assessment Completed by Ma, RN and URIEL Hager.    Head WDL  Ears WDL  Nose WDL  Mouth WDL  Neck WDL  Breast/Chest WDL  Shoulder Blades WDL  Spine WDL  (R) Arm/Elbow/Hand Bruising  (L) Arm/Elbow/Hand WDL  Abdomen WDL  Groin WDL  Scrotum/Coccyx/Buttocks Redness and Blanching, sacral foam protector in place  (R) Leg dry, flaky, fragile  (L) Leg dry, flaky, fragile  (R) Heel/Foot/Toe dry, flaky, fragile  (L) Heel/Foot/Toe dry, flaky, fragile          Devices In Places Pulse Ox and SCD's, purewick      Interventions In Place Gray Ear Foams, Waffle Overlay, TAP System, and Q2 Turns    Possible Skin Injury No    Pictures Uploaded Into Epic N/A  Wound Consult Placed N/A  RN Wound Prevention Protocol Ordered No

## 2024-08-06 NOTE — PROGRESS NOTES
Hospital Medicine Daily Progress Note    Date of Service  8/6/2024    Chief Complaint  Norah Mchugh is a 84 y.o. female admitted 8/5/2024 with shortness of breath    Hospital Course  History of hypothyroidism and emphysema, not on home oxygen admitted for shortness of breath found to have wheezing and + COVID-19.  She also had acute respiratory failure with hypoxia and was placed on 2 L    Interval Problem Update  8/6: Wheezing has improved, still with rhonchi.  Unable to wean from oxygen.  88% on RA.  IV solumedrol.  Na improving    I have discussed this patient's plan of care and discharge plan at IDT rounds today with Case Management, Nursing, Nursing leadership, and other members of the IDT team.    Consultants/Specialty  None    Code Status  Full Code    Disposition  The patient is not medically cleared for discharge to home or a post-acute facility.  Anticipate discharge to: home with close outpatient follow-up    I have placed the appropriate orders for post-discharge needs.    Review of Systems  Review of Systems   Constitutional:  Positive for malaise/fatigue. Negative for chills and fever.   HENT:  Negative for sore throat.    Respiratory:  Positive for cough, sputum production and shortness of breath.    Cardiovascular:  Negative for chest pain and palpitations.   Gastrointestinal:  Negative for abdominal pain, blood in stool, diarrhea, heartburn, nausea and vomiting.   Genitourinary:  Negative for dysuria and frequency.   Musculoskeletal:  Negative for back pain and myalgias.   Neurological:  Negative for dizziness, focal weakness, weakness and headaches.   Psychiatric/Behavioral:  Negative for depression and hallucinations.    All other systems reviewed and are negative.       Physical Exam  Temp:  [36 °C (96.8 °F)-36.5 °C (97.7 °F)] 36 °C (96.8 °F)  Pulse:  [69-94] 80  Resp:  [17-20] 18  BP: (135-178)/(70-89) 161/72  SpO2:  [84 %-98 %] 91 %    Physical Exam  Constitutional:       General:  She is not in acute distress.  HENT:      Nose: Nose normal.      Mouth/Throat:      Mouth: Mucous membranes are dry.   Cardiovascular:      Rate and Rhythm: Normal rate and regular rhythm.      Pulses: Normal pulses.   Pulmonary:      Effort: No respiratory distress.      Breath sounds: Rhonchi and rales present. No wheezing.   Abdominal:      General: There is no distension.      Palpations: Abdomen is soft.   Musculoskeletal:         General: No swelling.      Cervical back: No muscular tenderness.   Lymphadenopathy:      Cervical: No cervical adenopathy.   Skin:     General: Skin is warm and dry.      Coloration: Skin is pale.      Findings: No rash.   Neurological:      General: No focal deficit present.      Mental Status: She is alert and oriented to person, place, and time.      Motor: Weakness present.   Psychiatric:         Mood and Affect: Mood normal.         Thought Content: Thought content normal.         Fluids    Intake/Output Summary (Last 24 hours) at 8/6/2024 1549  Last data filed at 8/6/2024 1322  Gross per 24 hour   Intake 360 ml   Output 0 ml   Net 360 ml       Laboratory  Recent Labs     08/05/24  1728 08/06/24  0207   WBC 3.6* 2.2*   RBC 5.97* 5.39   HEMOGLOBIN 18.1* 16.3*   HEMATOCRIT 54.3* 48.5*   MCV 91.0 90.0   MCH 30.3 30.2   MCHC 33.3 33.6   RDW 47.3 46.2   PLATELETCT 200 162*   MPV 10.5 10.9     Recent Labs     08/05/24  1728 08/06/24  0207   SODIUM 126* 130*   POTASSIUM 4.2 4.0   CHLORIDE 87* 95*   CO2 25 22   GLUCOSE 96 131*   BUN 10 8   CREATININE 0.59 0.33*   CALCIUM 9.2 8.2*     Recent Labs     08/05/24  1728   INR 0.87               Imaging  DX-CHEST-PORTABLE (1 VIEW)   Final Result      1.  Hyperinflation consistent with COPD   2.  No pneumonia or pneumothorax.           Assessment/Plan  * Hyponatremia- (present on admission)  Assessment & Plan  Hypovolemic hyponatremia, possibly some component of SIADH related to COVID  Sodium is improving  Continue IVF  Repeat in  a.m.    Polycythemia- (present on admission)  Assessment & Plan  Multifactorial secondary to emphysema and dehydration  Anticipate improvement with intravenous fluids.  Repeat CBC in a.m.    COVID-19 virus infection- (present on admission)  Assessment & Plan  The patient does have scattered wheeze and bronchospasm.   Continue IV Solu-Medrol given that she does have COPD, we need to prednisone in a.m. if she is able to come off oxygen  Isolation    Dehydration- (present on admission)  Assessment & Plan  Likely secondary to reduced oral intake, and increase in insensible loss due to infection.  Continue fluids  Repeat sodium in a.m.    Other emphysema (HCC)- (present on admission)  Assessment & Plan  Scattered wheezing has improved  Complete IV steroids today, changed to p.o. in a.m.  She has been unable to wean from oxygen, saturation on room air is 88%  Oxygen as needed, Respiratory protocol, Bronchodilators, Incentive spirometry    Acquired hypothyroidism- (present on admission)  Assessment & Plan  Continue [place] 45mg         VTE prophylaxis:   SCDs/TEDs   Xarelto 10mg daily as prophylaxis      I have performed a physical exam and reviewed and updated ROS and Plan today (8/6/2024). In review of yesterday's note (8/5/2024), there are no changes except as documented above.      Total time:  49 minutes.  I spent greater than 50% of the time for patient care, counseling, and coordination on this date, including unit/floor time, and face-to-face time with the patient as per interval events and assessment and plan above

## 2024-08-06 NOTE — ED PROVIDER NOTES
"ED Provider Note    CHIEF COMPLAINT  Chief Complaint   Patient presents with    Shortness of Breath       EXTERNAL RECORDS REVIEWED  Other none    HPI/ROS  LIMITATION TO HISTORY   Select: : None    OUTSIDE HISTORIAN(S):  Family pt's daughter    Norah Mchugh is a 84 y.o. female with a history of hypertension, glaucoma, and thyroid disorder who presents for shortness of breath.    Patient had mild upper respiratory symptoms with nasal congestion on Wednesday of last week and tested positive for COVID.  Patient states \"I was getting better every day.\"  However, she reports a cough that is productive of sputum, mainly whitish-yellow.  She reports feeling short of breath occasionally.  Patient reports some nausea yesterday.  Patient denies chest pain, diaphoresis, leg swelling, calf pain, hemoptysis, fever, chills, vomiting, diarrhea, history of PE/DVT.    Patient's daughter feels the patient tends to minimize her symptoms and is more short of breath than the patient is stating.  She requests a breathing treatment and urinalysis.    Patient does not use home O2.    PAST MEDICAL HISTORY   has a past medical history of CATARACT, Glaucoma, Hypertension, Pain (05-18-12), Polio, and Unspecified disorder of thyroid.    SURGICAL HISTORY   has a past surgical history that includes joint injection diagnostic (5/24/2012); other; bunionectomy; other orthopedic surgery (2007); other orthopedic surgery (2002); incision and drainage orthopedic (8/30/2012); cataract phaco with iol (2/26/2013); and hip arthroplasty total (7/26/2012).    FAMILY HISTORY  Family History   Problem Relation Age of Onset    Heart Disease Mother     Other Father         suicide    Psychiatric Illness Father     Cancer Sister         liver    Psychiatric Illness Brother         depression    Other Brother         suicide       SOCIAL HISTORY  Social History     Tobacco Use    Smoking status: Former     Current packs/day: 0.00     Average packs/day: " "2.0 packs/day for 20.0 years (40.0 ttl pk-yrs)     Types: Cigarettes     Start date: 1952     Quit date: 1972     Years since quittin.6    Smokeless tobacco: Never   Vaping Use    Vaping status: Never Used   Substance and Sexual Activity    Alcohol use: Not Currently     Alcohol/week: 8.4 oz     Types: 14 Glasses of wine per week     Comment: 2 glasses of red wine/night     Drug use: No    Sexual activity: Yes     Partners: Male     Birth control/protection: Post-Menopausal       CURRENT MEDICATIONS  Home Medications    **Home medications have not yet been reviewed for this encounter**       Audit from Redirected Encounters    **Home medications have not yet been reviewed for this encounter**         ALLERGIES  Allergies   Allergen Reactions    Milk Protein Nausea    Other Drug Nausea     An antibiotic, name unknown       PHYSICAL EXAM  VITAL SIGNS: BP (!) 153/63   Pulse 83   Temp 36.8 °C (98.2 °F) (Temporal)   Resp 18   Ht 1.626 m (5' 4\")   Wt 52.2 kg (115 lb)   SpO2 90% Comment: 89-90%  BMI 19.74 kg/m²    General:  WD thin elderly female, nontoxic appearing in NAD; A+Ox3; V/S as above; afebrile, elevated blood pressure, borderline hypoxia  Skin: warm and dry; good color; no rash  HEENT: NCAT; EOMs intact; no scleral icterus   Neck: No stridor  Cardiovascular: Regular heart rate and rhythm.  No murmurs, rubs, or gallops; pulses 2+ bilaterally radially   Lungs: No respiratory distress or tachypnea; Clear to auscultation with decreased air movement bilaterally.  No wheezes, rhonchi, or rales.   Extremities: DE [place] SANTOS x 4; no e/o trauma; no pedal edema; neg Blue's  Neurologic: CNs III-XII grossly intact; speech clear  Psychiatric: Appropriate affect, normal mood      EKG/LABS  Results for orders placed or performed during the hospital encounter of 24   CBC WITH DIFFERENTIAL   Result Value Ref Range    WBC 3.6 (L) 4.8 - 10.8 K/uL    RBC 5.97 (H) 4.20 - 5.40 M/uL    Hemoglobin 18.1 (H) 12.0 - 16.0 " g/dL    Hematocrit 54.3 (H) 37.0 - 47.0 %    MCV 91.0 81.4 - 97.8 fL    MCH 30.3 27.0 - 33.0 pg    MCHC 33.3 32.2 - 35.5 g/dL    RDW 47.3 35.9 - 50.0 fL    Platelet Count 200 164 - 446 K/uL    MPV 10.5 9.0 - 12.9 fL    Neutrophils-Polys 60.20 44.00 - 72.00 %    Lymphocytes 29.70 22.00 - 41.00 %    Monocytes 9.20 0.00 - 13.40 %    Eosinophils 0.30 0.00 - 6.90 %    Basophils 0.30 0.00 - 1.80 %    Immature Granulocytes 0.30 0.00 - 0.90 %    Nucleated RBC 0.00 0.00 - 0.20 /100 WBC    Neutrophils (Absolute) 2.15 1.82 - 7.42 K/uL    Lymphs (Absolute) 1.06 1.00 - 4.80 K/uL    Monos (Absolute) 0.33 0.00 - 0.85 K/uL    Eos (Absolute) 0.01 0.00 - 0.51 K/uL    Baso (Absolute) 0.01 0.00 - 0.12 K/uL    Immature Granulocytes (abs) 0.01 0.00 - 0.11 K/uL    NRBC (Absolute) 0.00 K/uL   TROPONIN   Result Value Ref Range    Troponin T 8 6 - 19 ng/L   Basic Metabolic Panel   Result Value Ref Range    Sodium 126 (L) 135 - 145 mmol/L    Potassium 4.2 3.6 - 5.5 mmol/L    Chloride 87 (L) 96 - 112 mmol/L    Co2 25 20 - 33 mmol/L    Glucose 96 65 - 99 mg/dL    Bun 10 8 - 22 mg/dL    Creatinine 0.59 0.50 - 1.40 mg/dL    Calcium 9.2 8.4 - 10.2 mg/dL    Anion Gap 14.0 7.0 - 16.0   Prothrombin Time   Result Value Ref Range    PT 12.3 12.0 - 14.6 sec    INR 0.87 0.87 - 1.13   CoV-2, FLU A/B, and RSV by PCR (2-4 Hours CEPHEID) : Collect NP swab in VTM    Specimen: Respirate   Result Value Ref Range    SARS-CoV-2 Source NP Swab    proBrain Natriuretic Peptide, NT   Result Value Ref Range    NT-proBNP 1200 (H) 0 - 125 pg/mL   ESTIMATED GFR   Result Value Ref Range    GFR (CKD-EPI) 88 >60 mL/min/1.73 m 2   EKG   Result Value Ref Range    Report       [facility] Emergency Dept[facility]    Test Date:  2024  Pt Name:    EULALIO BONE         Department: [facility]  MRN:        [id number]                      [facility]  Gender:     Female                       Technician: [id number]  :        [id number]                    Requested By:ANGELO JIANG  Order #:    [id number]                    Reading MD: ANGELO JIANG MD    Measurements  Intervals                                Axis  Rate:       83                           P:          85  OR:         191                          QRS:        84  QRSD:       94                           T:          74  QT:         377  QTc:        443    Interpretive Statements  Sinus rhythm  Probable left atrial enlargement  Anteroseptal infarct, age indeterminate  Compared to ECG 08/28/2014 15:22:09  Myocardial infarct finding now present  ST (T wave) deviation no longer present  Electronically Signed On 08- 18:15:21 PDT by ANGELO JIANG MD         I have independently interpreted this EKG    RADIOLOGY/PROCEDURES   I have independently interpreted the diagnostic imaging associated with this visit and am waiting the final reading from the radiologist.   My preliminary interpretation is as follows:   Hyperinflation seen on chest x-ray    Radiologist interpretation:  DX-CHEST-PORTABLE (1 VIEW)   Final Result      1.  Hyperinflation consistent with COPD   2.  No pneumonia or pneumothorax.          COURSE & MEDICAL DECISION MAKING    ASSESSMENT, COURSE AND PLAN  Care Narrative: This is an 84-year-old female with history of emphysema who was recently diagnosed with COVID 5 days ago who presents for shortness of breath.  Patient does not use home O2.  She was noted to be hypoxic between 88 and 90% on room air here.    I considered CHF, pericarditis, COPD exacerbation, PE, ACS, COVID effects, pneumonia.    EKG showed sinus rhythm.  No acute ST changes.    Chest x-ray shows hyperinflation consistent with COPD.  No consolidation or pneumothorax.    Labs demonstrate hyponatremia which is new for the patient and elevated BNP.  Troponin negative.  White blood cell count low at 3.6.  Urinalysis pending.    6:17 PM  RN advised me that the patient and the family would like to leave.  I met with them  and advised them of the elevated BNP, low sodium, and plan to admit for further workup given symptoms of shortness of breath and mild hypoxia.  Patient is amenable to this.    6:19 PM  I discussed the case with the hospitalist who agrees to admit the patient.  D-dimer added to blood work.      DISPOSITION AND DISCUSSIONS  I have discussed management of the patient with the following physicians and ARLEEN's:    Betty    FINAL DIAGNOSIS  1. SOB (shortness of breath)    2. Elevated brain natriuretic peptide (BNP) level    3. Hyponatremia         Electronically signed by: Monica Schaefer M.D., 8/5/2024 5:03 PM

## 2024-08-06 NOTE — ASSESSMENT & PLAN NOTE
Multifactorial secondary to emphysema and dehydration  Anticipate improvement with intravenous fluids.  Repeat CBC in a.m.

## 2024-08-06 NOTE — CARE PLAN
The patient is Stable - Low risk of patient condition declining or worsening    Shift Goals  Clinical Goals: Maintain O2 sats at 92% or above  Patient Goals: go home  Family Goals: NA    Progress made toward(s) clinical / shift goals:  Pt maintained >90% SpO2 with supplemental O2. Multiple BM. Tolerated diet, denies nausea. Denies any pain. Pt did not sustain any fall during this shift.     Patient is not progressing towards the following goals:

## 2024-08-06 NOTE — PROGRESS NOTES
ISOLATION PRECAUTIONS EDUCATION    Educated PATIENT, FAMILY, S.O: patient on isolation for COVID-19.    Educated on reason for isolation, how the infection may be transmitted, and how to help prevent transmission to others. Educated precautions involves staff and visitors wearing PPE, following Standard Precautions and performing meticulous hand hygiene in order to prevent transmission of infection.     Enhanced Droplet Precautions: Educated that Enhanced Droplet Precautions involves staff and visitors wearing a surgical mask when in the patient room.     In addition,  educated that they may leave their room, but prior to exiting the patient room each time, the patient needs to have on a fresh patient gown, a surgical mask must be worn by the patient while out of the patient room, and perform hand hygiene immediately prior to exiting the room.     Patient transport and mobilization on unit  Educated that they may leave their room, but prior to exiting, the patient needs to have on a fresh patient gown, ensure the potentially infectious area is covered, performing appropriate hand hygiene immediately prior to exiting the room.

## 2024-08-06 NOTE — ED NOTES
Assumed care of pt  No s/s of acute distress.   Call light w/in reach no further needs at this time

## 2024-08-07 ENCOUNTER — PHARMACY VISIT (OUTPATIENT)
Dept: PHARMACY | Facility: MEDICAL CENTER | Age: 84
End: 2024-08-07
Payer: COMMERCIAL

## 2024-08-07 ENCOUNTER — HOME HEALTH ADMISSION (OUTPATIENT)
Dept: HOME HEALTH SERVICES | Facility: HOME HEALTHCARE | Age: 84
End: 2024-08-07
Payer: MEDICARE

## 2024-08-07 VITALS
SYSTOLIC BLOOD PRESSURE: 164 MMHG | RESPIRATION RATE: 17 BRPM | HEIGHT: 64 IN | OXYGEN SATURATION: 93 % | HEART RATE: 76 BPM | WEIGHT: 114.2 LBS | BODY MASS INDEX: 19.5 KG/M2 | DIASTOLIC BLOOD PRESSURE: 96 MMHG | TEMPERATURE: 97.5 F

## 2024-08-07 LAB
ALBUMIN SERPL BCP-MCNC: 3.5 G/DL (ref 3.2–4.9)
BASOPHILS # BLD AUTO: 0 % (ref 0–1.8)
BASOPHILS # BLD: 0 K/UL (ref 0–0.12)
BUN SERPL-MCNC: 13 MG/DL (ref 8–22)
CALCIUM ALBUM COR SERPL-MCNC: 9.1 MG/DL (ref 8.5–10.5)
CALCIUM SERPL-MCNC: 8.7 MG/DL (ref 8.4–10.2)
CHLORIDE SERPL-SCNC: 98 MMOL/L (ref 96–112)
CO2 SERPL-SCNC: 20 MMOL/L (ref 20–33)
CREAT SERPL-MCNC: 0.35 MG/DL (ref 0.5–1.4)
EOSINOPHIL # BLD AUTO: 0 K/UL (ref 0–0.51)
EOSINOPHIL NFR BLD: 0 % (ref 0–6.9)
ERYTHROCYTE [DISTWIDTH] IN BLOOD BY AUTOMATED COUNT: 45.1 FL (ref 35.9–50)
GFR SERPLBLD CREATININE-BSD FMLA CKD-EPI: 100 ML/MIN/1.73 M 2
GLUCOSE SERPL-MCNC: 129 MG/DL (ref 65–99)
HCT VFR BLD AUTO: 47 % (ref 37–47)
HGB BLD-MCNC: 16.2 G/DL (ref 12–16)
IMM GRANULOCYTES # BLD AUTO: 0.01 K/UL (ref 0–0.11)
IMM GRANULOCYTES NFR BLD AUTO: 0.4 % (ref 0–0.9)
LYMPHOCYTES # BLD AUTO: 0.7 K/UL (ref 1–4.8)
LYMPHOCYTES NFR BLD: 29.5 % (ref 22–41)
MAGNESIUM SERPL-MCNC: 1.9 MG/DL (ref 1.5–2.5)
MCH RBC QN AUTO: 30.6 PG (ref 27–33)
MCHC RBC AUTO-ENTMCNC: 34.5 G/DL (ref 32.2–35.5)
MCV RBC AUTO: 88.7 FL (ref 81.4–97.8)
MONOCYTES # BLD AUTO: 0.26 K/UL (ref 0–0.85)
MONOCYTES NFR BLD AUTO: 11 % (ref 0–13.4)
NEUTROPHILS # BLD AUTO: 1.4 K/UL (ref 1.82–7.42)
NEUTROPHILS NFR BLD: 59.1 % (ref 44–72)
NRBC # BLD AUTO: 0 K/UL
NRBC BLD-RTO: 0 /100 WBC (ref 0–0.2)
PHOSPHATE SERPL-MCNC: 2.4 MG/DL (ref 2.5–4.5)
PLATELET # BLD AUTO: 197 K/UL (ref 164–446)
PMV BLD AUTO: 10.7 FL (ref 9–12.9)
POTASSIUM SERPL-SCNC: 4.2 MMOL/L (ref 3.6–5.5)
RBC # BLD AUTO: 5.3 M/UL (ref 4.2–5.4)
SODIUM SERPL-SCNC: 135 MMOL/L (ref 135–145)
WBC # BLD AUTO: 2.4 K/UL (ref 4.8–10.8)

## 2024-08-07 PROCEDURE — A9270 NON-COVERED ITEM OR SERVICE: HCPCS | Performed by: HOSPITALIST

## 2024-08-07 PROCEDURE — 80069 RENAL FUNCTION PANEL: CPT

## 2024-08-07 PROCEDURE — 700111 HCHG RX REV CODE 636 W/ 250 OVERRIDE (IP): Performed by: HOSPITALIST

## 2024-08-07 PROCEDURE — 700102 HCHG RX REV CODE 250 W/ 637 OVERRIDE(OP): Performed by: INTERNAL MEDICINE

## 2024-08-07 PROCEDURE — RXMED WILLOW AMBULATORY MEDICATION CHARGE: Performed by: INTERNAL MEDICINE

## 2024-08-07 PROCEDURE — 85025 COMPLETE CBC W/AUTO DIFF WBC: CPT

## 2024-08-07 PROCEDURE — A9270 NON-COVERED ITEM OR SERVICE: HCPCS | Performed by: INTERNAL MEDICINE

## 2024-08-07 PROCEDURE — 99239 HOSP IP/OBS DSCHRG MGMT >30: CPT | Performed by: INTERNAL MEDICINE

## 2024-08-07 PROCEDURE — 36415 COLL VENOUS BLD VENIPUNCTURE: CPT

## 2024-08-07 PROCEDURE — 94760 N-INVAS EAR/PLS OXIMETRY 1: CPT

## 2024-08-07 PROCEDURE — 83735 ASSAY OF MAGNESIUM: CPT

## 2024-08-07 PROCEDURE — 700111 HCHG RX REV CODE 636 W/ 250 OVERRIDE (IP): Performed by: INTERNAL MEDICINE

## 2024-08-07 PROCEDURE — 700102 HCHG RX REV CODE 250 W/ 637 OVERRIDE(OP): Performed by: HOSPITALIST

## 2024-08-07 RX ORDER — GUAIFENESIN 600 MG/1
600 TABLET, EXTENDED RELEASE ORAL EVERY 12 HOURS
Qty: 28 TABLET | Refills: 1 | Status: SHIPPED | OUTPATIENT
Start: 2024-08-07

## 2024-08-07 RX ORDER — PREDNISONE 10 MG/1
30 TABLET ORAL DAILY
Qty: 6 TABLET | Refills: 0 | Status: SHIPPED | OUTPATIENT
Start: 2024-08-08 | End: 2024-08-10

## 2024-08-07 RX ORDER — MAGNESIUM SULFATE HEPTAHYDRATE 40 MG/ML
2 INJECTION, SOLUTION INTRAVENOUS ONCE
Status: COMPLETED | OUTPATIENT
Start: 2024-08-07 | End: 2024-08-07

## 2024-08-07 RX ORDER — ALBUTEROL SULFATE 90 UG/1
2 AEROSOL, METERED RESPIRATORY (INHALATION) EVERY 4 HOURS PRN
Qty: 8.5 G | Refills: 1 | Status: SHIPPED | OUTPATIENT
Start: 2024-08-07

## 2024-08-07 RX ADMIN — THYROID 45 MG: 30 TABLET ORAL at 05:48

## 2024-08-07 RX ADMIN — PREDNISONE 30 MG: 20 TABLET ORAL at 05:47

## 2024-08-07 RX ADMIN — ALBUTEROL SULFATE 2 PUFF: 90 AEROSOL, METERED RESPIRATORY (INHALATION) at 01:09

## 2024-08-07 RX ADMIN — ALBUTEROL SULFATE 2 PUFF: 90 AEROSOL, METERED RESPIRATORY (INHALATION) at 06:45

## 2024-08-07 RX ADMIN — GUAIFENESIN 600 MG: 600 TABLET, EXTENDED RELEASE ORAL at 05:47

## 2024-08-07 RX ADMIN — MAGNESIUM SULFATE HEPTAHYDRATE 2 G: 2 INJECTION, SOLUTION INTRAVENOUS at 08:53

## 2024-08-07 ASSESSMENT — PAIN DESCRIPTION - PAIN TYPE: TYPE: ACUTE PAIN

## 2024-08-07 NOTE — CARE PLAN
The patient is Stable - Low risk of patient condition declining or worsening    Shift Goals  Clinical Goals: Pt able to wean off oxygen, maintain oxygen saturation aboev 90%  Patient Goals: for discharge, rest comfortably  Family Goals: NA    Progress made toward(s) clinical / shift goals:  Educated for IS and deep breathing exercises. Pt able to wean off oxygen to room air at 93%. No falls or injury throughout the shift.    Patient is not progressing towards the following goals:      Problem: Ineffective Airway Clearance  Goal: Patient will maintain patent airway with clear/clearing breath sounds  Outcome: Progressing     Problem: Impaired Gas Exchange  Goal: Patient will demonstrate improved ventilation and adequate oxygenation and participate in treatment regimen within the level of ability/situation.  Outcome: Progressing     Problem: Skin Integrity  Goal: Skin integrity is maintained or improved  Outcome: Progressing     Problem: Fall Risk  Goal: Patient will remain free from falls  Outcome: Progressing     Problem: Discharge Barriers/Planning  Goal: Patient's continuum of care needs are met  Outcome: Progressing  Flowsheets (Taken 8/7/2024 1127)  Continuum of Care Needs:   Assessed for discharge barriers   Communicated discharge barriers to interdisciplinary tream   Involved patient/family/support system in discharge process   Collaborated with Case Management/   Provided and explained discharge instructions

## 2024-08-07 NOTE — CARE PLAN
The patient is Stable - Low risk of patient condition declining or worsening    Shift Goals  Clinical Goals: Monitor s/s of Resp. distress. Monitor V/S to ensur ept's safety. Zero fall to pt during the shift.  Patient Goals: Sleep without coughing.  Family Goals: NA  Progress made toward(s) clinical / shift goals:  PRN Medication Albuterol given per MAR once due to SOB. Hydralazine given due to high /88. Current /72. Pt seen sleeping after 0200 during the rounds.     Patient is not progressing towards the following goals:

## 2024-08-07 NOTE — PROGRESS NOTES
Received bedside report from night shift RN.  Assumed pt care.   Assessment and chart check complete.  Pt is AA0X4, respirations even and unlabored, no signs of distress, denies nausea/vomiting.  Updated for the POC of the day. Daughter at bedside.  Educated for IS.  Fall precautions in place, treaded socks on pt, bed in low position.   Call light within reach.   Educated pt to call if needing anything.   Hourly rounding.

## 2024-08-07 NOTE — PROGRESS NOTES
Discharging patient home per MD order. Pt demonstrated understanding of discharge instructions, follow up appointments, home medications, prescriptions. Pt is voiding without difficulty, pain well controlled, tolerating oral medications, O2 greater than 90%.Pt verbalized understanding of discharge instructions and educational handouts and all questions answered. PIV removed. Pt discharged off unit with hospital escort.

## 2024-08-07 NOTE — PROGRESS NOTES
4 Eyes Skin Assessment Completed by Otis RN and URIEL Hernández.    Head WDL  Ears WDL  Nose WDL  Mouth WDL  Neck WDL  Breast/Chest WDL  Shoulder Blades WDL  Spine Redness and Blanching  (R) Arm/Elbow/Hand WDL  (L) Arm/Elbow/Hand WDL  Abdomen WDL  Groin WDL  Scrotum/Coccyx/Buttocks Redness and Blanching  (R) Leg WDL  (L) Leg WDL  (R) Heel/Foot/Toe WDL  (L) Heel/Foot/Toe WDL          Devices In Places Pulse Ox and SCD's      Interventions In Place Gray Ear Foams, NC W/Ear Foams, and Pillows    Possible Skin Injury No    Pictures Uploaded Into Epic N/A  Wound Consult Placed N/A  RN Wound Prevention Protocol Ordered No

## 2024-08-07 NOTE — PROGRESS NOTES
4 Eyes Skin Assessment Completed by URIEL Cantu and URIEL Yu.     Head WDL  Ears WDL  Nose WDL  Mouth WDL  Neck WDL  Breast/Chest WDL  Shoulder Blades WDL  Spine Redness and Blanching  (R) Arm/Elbow/Hand Bruising  (L) Arm/Elbow/Hand WDL  Abdomen WDL  Groin WDL  Scrotum/Coccyx/Buttocks Redness and Blanching  (R) Leg WDL, Flaky  (L) Leg WDL, Flaky  (R) Heel/Foot/Toe WDL, Flaky  (L) Heel/Foot/Toe WDL, Flaky              Devices In Places Pulse Ox, SCD's,       Interventions  Pillows, Barrier paste     Possible Skin Injury No     Pictures Uploaded Into Epic N/A  Wound Consult Placed N/A  RN Wound Prevention Protocol Ordered No

## 2024-08-07 NOTE — PROGRESS NOTES
1912 Received report from day shift RN. Patient is awake and alert, resting in bed. A&O X4, room air, on 1L via nasal cannula. Unlabored respiration observed. NG tube I son, clamped. Care plan discussed including possible NG tube removal. Call light within reach. Personal belongings within reach. No needs required at this time. Safety precautions in place.

## 2024-08-07 NOTE — DISCHARGE PLANNING
ATTN: Case Management  RE: Referral for Home Health    As of 8/7/24, we have accepted the Home Health referral for the patient listed above.    A [facility] clinician will be out to see the patient within 48 hours. If you have any questions or concerns regarding the patient’s transition to Home Health, please do not hesitate to contact us at [phone].      We look forward to collaborating with you,  [facility] Home Health Team

## 2024-08-07 NOTE — DISCHARGE SUMMARY
Discharge Summary    CHIEF COMPLAINT ON ADMISSION  Chief Complaint   Patient presents with    Shortness of Breath       Reason for Admission  Shortness of Breath     Admission Date  8/5/2024    CODE STATUS  Full Code    HPI & HOSPITAL COURSE  This is a 84 y.o. female with a history of hypertension, remote tobacco abuse, hypothyroidism who was admitted for shortness of breath and cough found to have acute respiratory failure with hypoxia due to COVID-19.  She was started on empiric IV Solu-Medrol due to wheezing which was thought likely due to reactive airway disease.  She was also found to have profound hyponatremia with a sodium level of 126.  She was started on gentle IV fluids.    Her chest x-ray showed hyperinflation but no consolidation    She was able to wean down on oxygen oxygen on 8/6 however was unable to wean to room air.  She desaturated to 85% therefore was continued on supplemental oxygen and IV steroids.  On 8/7 her wheezing and cough had significantly improved.  She was transitioned to oral prednisone therapy.  With gentle IV fluids her sodium slowly increased from 1 26-1 30 and then up to 135 on the day of discharge.    Home health was ordered for her on discharge.  She is wheelchair-bound at baseline however was noting some decreased strength and energy since being hospitalized.    She will complete a total of 5 days of steroid therapy    Therefore, she is discharged in fair and stable condition to home with organized home healthcare and close outpatient follow-up.    The patient met 2-midnight criteria for an inpatient stay at the time of discharge.    Discharge Date  8/7/2024    FOLLOW UP ITEMS POST DISCHARGE  Follow-up with PCP    DISCHARGE DIAGNOSES  Principal Problem:    Hyponatremia (POA: Yes)  Active Problems:    Acquired hypothyroidism (POA: Yes)    Other emphysema (HCC) (POA: Yes)      Overview: Incidental findings of emphysema on CT in 2014    Dehydration (POA: Yes)    COVID-19 virus  infection (POA: Yes)    Polycythemia (POA: Yes)  Resolved Problems:    * No resolved hospital problems. *      FOLLOW UP    Guicho HANEY M.D.  [address]  [place] [address] [phone]    Follow up        MEDICATIONS ON DISCHARGE     Medication List        START taking these medications        Instructions   albuterol 108 (90 Base) MCG/ACT Aers inhalation aerosol   Inhale 2 Puffs every four hours as needed for Shortness of Breath.  Dose: 2 Puff     guaiFENesin  MG Tb12  Commonly known as: Mucinex   Take 1 Tablet by mouth every 12 hours.  Dose: 600 mg     predniSONE 10 MG Tabs  Start taking on: August 8, 2024  Commonly known as: Deltasone   Take 3 Tablets by mouth every day for 2 days.  Dose: 30 mg            CONTINUE taking these medications        Instructions   [place] Thyroid 15 MG Tabs  Generic drug: thyroid   Take 3 Tablets by mouth every day. Indications: Underactive Thyroid  Dose: 3 Tablet     vitamin D3 5000 Unit (125 mcg) Tabs  Commonly known as: Cholecalciferol   Take 5,000 Units by mouth every evening. Indications: Vitamin D Deficiency  Dose: 5,000 Units              Allergies  Allergies   Allergen Reactions    Milk Protein Nausea    Other Drug Nausea     An antibiotic, name unknown       DIET  Orders Placed This Encounter   Procedures    Diet Order Diet: Cardiac     Standing Status:   Standing     Number of Occurrences:   1     Order Specific Question:   Diet:     Answer:   Cardiac [6]       ACTIVITY  As tolerated.  Wheelchair-bound at baseline, okay for transfers    CONSULTATIONS  None    PROCEDURES  None    LABORATORY  Lab Results   Component Value Date    SODIUM 135 08/07/2024    POTASSIUM 4.2 08/07/2024    CHLORIDE 98 08/07/2024    CO2 20 08/07/2024    GLUCOSE 129 (H) 08/07/2024    BUN 13 08/07/2024    CREATININE 0.35 (L) 08/07/2024        Lab Results   Component Value Date    WBC 2.4 (L) 08/07/2024    HEMOGLOBIN 16.2 (H) 08/07/2024    HEMATOCRIT 47.0 08/07/2024    PLATELETCT  197 08/07/2024        Total time of the discharge process exceeds 38 minutes.

## 2024-08-07 NOTE — DISCHARGE PLANNING
Case Management Discharge Planning    Admission Date: 8/5/2024  GMLOS: 5  ALOS: 2    6-Clicks ADL Score: 14  6-Clicks Mobility Score: 10  PT and/or OT Eval ordered: No  Post-acute Referrals Ordered: No  Post-acute Choice Obtained: NA  Has referral(s) been sent to post-acute provider:  NETO      Anticipated Discharge Dispo: Discharge Disposition: Discharged to home/self care (01)  [facility] [address] [place]  Discharge Contact Phone Number: [phone]    DME Needed: No    Action(s) Taken: Choice obtained and Referral(s) sent    LSW received HH orders from  Met with pt at bedside to provide choice. Pt gave choice for [facility].   Faxed choice form to [facility], referral sent.     Escalations Completed: None    Medically Clear: Yes    Next Steps: LSW to follow    Barriers to Discharge: None    Is the patient up for discharge tomorrow: No

## 2024-08-07 NOTE — PROGRESS NOTES
4 Eyes Skin Assessment Completed by URIEL Yu and URIEL Forte.    Head WDL  Ears WDL  Nose WDL  Mouth WDL  Neck WDL  Breast/Chest WDL  Shoulder Blades WDL  Spine Redness and Blanching  (R) Arm/Elbow/Hand WDL  (L) Arm/Elbow/Hand WDL  Abdomen WDL  Groin WDL  Scrotum/Coccyx/Buttocks Redness and Blanching  (R) Leg WDL  (L) Leg WDL  (R) Heel/Foot/Toe WDL  (L) Heel/Foot/Toe WDL          Devices In Places Pulse Ox, SCD's, and Nasal Cannula      Interventions In Place Gray Ear Foams, NC W/Ear Foams, and Pillows    Possible Skin Injury No    Pictures Uploaded Into Epic N/A  Wound Consult Placed N/A  RN Wound Prevention Protocol Ordered No

## 2024-08-13 ENCOUNTER — HOME CARE VISIT (OUTPATIENT)
Dept: HOME HEALTH SERVICES | Facility: HOME HEALTHCARE | Age: 84
End: 2024-08-13
Payer: MEDICARE

## 2024-08-13 ENCOUNTER — DOCUMENTATION (OUTPATIENT)
Dept: MEDICAL GROUP | Facility: PHYSICIAN GROUP | Age: 84
End: 2024-08-13
Payer: MEDICARE

## 2024-08-13 VITALS
BODY MASS INDEX: 19.6 KG/M2 | HEART RATE: 88 BPM | SYSTOLIC BLOOD PRESSURE: 136 MMHG | TEMPERATURE: 97.6 F | RESPIRATION RATE: 20 BRPM | DIASTOLIC BLOOD PRESSURE: 70 MMHG | HEIGHT: 64 IN | OXYGEN SATURATION: 91 %

## 2024-08-13 PROCEDURE — 665005 NO-PAY RAP - HOME HEALTH

## 2024-08-13 PROCEDURE — G0299 HHS/HOSPICE OF RN EA 15 MIN: HCPCS

## 2024-08-13 ASSESSMENT — ENCOUNTER SYMPTOMS
DYSPNEA ON EXERTION: 1
LAST BOWEL MOVEMENT: 67063
COUGH CHARACTERISTICS: NON-PRODUCTIVE
FATIGUES EASILY: 1
DIZZINESS: 1
PERSON REPORTING PAIN: PATIENT
SHORTNESS OF BREATH: 1
DENIES PAIN: 1
STOOL FREQUENCY: LESS THAN DAILY
FATIGUE: 1
COUGH: 1
DYSPNEA ACTIVITY LEVEL: AT REST
SEVERE DYSPNEA: 1
CONSTIPATION: 1

## 2024-08-13 ASSESSMENT — ACTIVITIES OF DAILY LIVING (ADL): OASIS_M1830: 05

## 2024-08-13 NOTE — PROGRESS NOTES
Medication chart review for [facility] services    Received referral from [facility].   Medications reviewed  compared with discharge summary if available.  Discharge summary date, if applicable:   8/7/24    Current medication list per [facility]     Medication list one, patient is currently taking    Current Outpatient Medications:     Vitamins A & D (VITAMIN A & D PO), 1 Capsule, Oral, DAILY    acetaminophen, 500 mg, Oral, Q6HRS PRN    Magnesium Citrate, 30 mL, Oral, QDAY PRN    albuterol, 2 Puff, Inhalation, Q4HRS PRN    guaiFENesin ER, 600 mg, Oral, Q12HRS (Patient not taking: Reported on 8/13/2024)    thyroid, 3 Tablet, Oral, DAILY      Medication list two, drugs that the patient has been prescribed or recommended to take by their healthcare provider on discharge summary        START taking these medications         Instructions   albuterol 108 (90 Base) MCG/ACT Aers inhalation aerosol    Inhale 2 Puffs every four hours as needed for Shortness of Breath.  Dose: 2 Puff      guaiFENesin  MG Tb12  Commonly known as: Mucinex    Take 1 Tablet by mouth every 12 hours.  Dose: 600 mg      predniSONE 10 MG Tabs  Start taking on: August 8, 2024  Commonly known as: Deltasone    Take 3 Tablets by mouth every day for 2 days.  Dose: 30 mg                CONTINUE taking these medications         Instructions   [place] Thyroid 15 MG Tabs  Generic drug: thyroid    Take 3 Tablets by mouth every day. Indications: Underactive Thyroid  Dose: 3 Tablet      vitamin D3 5000 Unit (125 mcg) Tabs  Commonly known as: Cholecalciferol    Take 5,000 Units by mouth every evening. Indications: Vitamin D Deficiency  Dose: 5,000 Units          Allergies   Allergen Reactions    Milk Protein Nausea    Other Drug Nausea     An antibiotic, name unknown       Labs     Lab Results   Component Value Date/Time    SODIUM 135 08/07/2024 02:30 AM    POTASSIUM 4.2 08/07/2024 02:30 AM    CHLORIDE 98 08/07/2024 02:30 AM    CO2 20 08/07/2024  02:30 AM    GLUCOSE 129 (H) 08/07/2024 02:30 AM    BUN 13 08/07/2024 02:30 AM    CREATININE 0.35 (L) 08/07/2024 02:30 AM     Lab Results   Component Value Date/Time    ALKPHOSPHAT 80 08/06/2024 02:07 AM    ASTSGOT 19 08/06/2024 02:07 AM    ALTSGPT 11 08/06/2024 02:07 AM    TBILIRUBIN 0.3 08/06/2024 02:07 AM    INR 0.87 08/05/2024 05:28 PM    ALBUMIN 3.5 08/07/2024 02:30 AM        Assessment for clinically significant drug interactions, drug omissions/additions, duplicative therapies.            CC   Guicho HANEY M.D.  [address]  [facility] [address]  Fax: [phone]    [facility]  Phone [phone] fax [phone]    This note was created using voice recognition software (Dragon). The accuracy of the dictation is limited by the abilities of the software. I have reviewed the note prior to signing, however some errors in grammar and context are still possible. If you have any questions related to this note please do not hesitate to contact our office.

## 2024-08-13 NOTE — Clinical Note
Primary dx/Skilled need:emphysema, COVID 19  SN frequency:2w4; HHA1w1, 2w3  Zip code:[address]  Disciplines ordered:SN, HHA, PT, OT  Isurance & authorization:[employer]  Certification period:8/13/2024-10/11/2024  Special considerations:symptoms 8/5/2024, COVID positive 8/5/2024

## 2024-08-14 ENCOUNTER — HOME CARE VISIT (OUTPATIENT)
Dept: HOME HEALTH SERVICES | Facility: HOME HEALTHCARE | Age: 84
End: 2024-08-14
Payer: MEDICARE

## 2024-08-14 ENCOUNTER — HOSPITAL ENCOUNTER (OUTPATIENT)
Dept: LAB | Facility: MEDICAL CENTER | Age: 84
End: 2024-08-14
Attending: FAMILY MEDICINE
Payer: MEDICARE

## 2024-08-14 LAB
ALBUMIN SERPL BCP-MCNC: 3.6 G/DL (ref 3.2–4.9)
ALBUMIN/GLOB SERPL: 1.5 G/DL
ALP SERPL-CCNC: 93 U/L (ref 30–99)
ALT SERPL-CCNC: 18 U/L (ref 2–50)
ANION GAP SERPL CALC-SCNC: 11 MMOL/L (ref 7–16)
AST SERPL-CCNC: 16 U/L (ref 12–45)
BASOPHILS # BLD AUTO: 0.5 % (ref 0–1.8)
BASOPHILS # BLD: 0.03 K/UL (ref 0–0.12)
BILIRUB SERPL-MCNC: 0.3 MG/DL (ref 0.1–1.5)
BUN SERPL-MCNC: 13 MG/DL (ref 8–22)
CALCIUM ALBUM COR SERPL-MCNC: 8.5 MG/DL (ref 8.5–10.5)
CALCIUM SERPL-MCNC: 8.2 MG/DL (ref 8.4–10.2)
CHLORIDE SERPL-SCNC: 97 MMOL/L (ref 96–112)
CO2 SERPL-SCNC: 27 MMOL/L (ref 20–33)
CREAT SERPL-MCNC: 0.47 MG/DL (ref 0.5–1.4)
EOSINOPHIL # BLD AUTO: 0.11 K/UL (ref 0–0.51)
EOSINOPHIL NFR BLD: 1.9 % (ref 0–6.9)
ERYTHROCYTE [DISTWIDTH] IN BLOOD BY AUTOMATED COUNT: 48.3 FL (ref 35.9–50)
GFR SERPLBLD CREATININE-BSD FMLA CKD-EPI: 93 ML/MIN/1.73 M 2
GLOBULIN SER CALC-MCNC: 2.4 G/DL (ref 1.9–3.5)
GLUCOSE SERPL-MCNC: 118 MG/DL (ref 65–99)
HCT VFR BLD AUTO: 45.8 % (ref 37–47)
HGB BLD-MCNC: 15 G/DL (ref 12–16)
IMM GRANULOCYTES # BLD AUTO: 0.01 K/UL (ref 0–0.11)
IMM GRANULOCYTES NFR BLD AUTO: 0.2 % (ref 0–0.9)
LYMPHOCYTES # BLD AUTO: 1.63 K/UL (ref 1–4.8)
LYMPHOCYTES NFR BLD: 27.7 % (ref 22–41)
MCH RBC QN AUTO: 30.1 PG (ref 27–33)
MCHC RBC AUTO-ENTMCNC: 32.8 G/DL (ref 32.2–35.5)
MCV RBC AUTO: 92 FL (ref 81.4–97.8)
MONOCYTES # BLD AUTO: 0.49 K/UL (ref 0–0.85)
MONOCYTES NFR BLD AUTO: 8.3 % (ref 0–13.4)
NEUTROPHILS # BLD AUTO: 3.61 K/UL (ref 1.82–7.42)
NEUTROPHILS NFR BLD: 61.4 % (ref 44–72)
NRBC # BLD AUTO: 0 K/UL
NRBC BLD-RTO: 0 /100 WBC (ref 0–0.2)
NT-PROBNP SERPL IA-MCNC: 1293 PG/ML (ref 0–125)
PLATELET # BLD AUTO: 262 K/UL (ref 164–446)
PMV BLD AUTO: 9.6 FL (ref 9–12.9)
POTASSIUM SERPL-SCNC: 3.8 MMOL/L (ref 3.6–5.5)
PROT SERPL-MCNC: 6 G/DL (ref 6–8.2)
RBC # BLD AUTO: 4.98 M/UL (ref 4.2–5.4)
SODIUM SERPL-SCNC: 135 MMOL/L (ref 135–145)
WBC # BLD AUTO: 5.9 K/UL (ref 4.8–10.8)

## 2024-08-14 PROCEDURE — 80053 COMPREHEN METABOLIC PANEL: CPT

## 2024-08-14 PROCEDURE — 83880 ASSAY OF NATRIURETIC PEPTIDE: CPT

## 2024-08-14 PROCEDURE — 36415 COLL VENOUS BLD VENIPUNCTURE: CPT

## 2024-08-14 PROCEDURE — 85025 COMPLETE CBC W/AUTO DIFF WBC: CPT

## 2024-08-14 NOTE — CASE COMMUNICATION
Dtr called in to HH today, requesting nuext nurse out for visit to draw labs ordered by Dr Jacob end of day yesterday. Dtr does not have lab slip. At this time no lab orders have been recieved. Explained to her will have nurse look for lab order before visit Friday, but we cannot draw labs without order.   Dtr also stated that MD yesterday ordered oxygen for pt. Nurse to address new oxygen at next visit.   Final request from dtr was  for therapy to wait until early next week to schedule, as pt is still fatigued.

## 2024-08-14 NOTE — CASE COMMUNICATION
noted  ----- Message -----  From: Siena Ramsay R.N.  Sent: 8/13/2024   4:29 PM PDT  To: Dania Andrade R.N.; Radha Watson, OT; *      Primary dx/Skilled need:emphysema, COVID 19  SN frequency:2w4; HHA1w1, 2w3  Zip code:[address]  Disciplines ordered:SN, HHA, PT, OT  Isurance & authorization:[facility]  Certification period:8/13/2024-10/11/2024  Special considerations:symptoms 8/5/2024, COVID positive 8/5/2024

## 2024-08-15 NOTE — CASE COMMUNICATION
noted  ----- Message -----  From: Isabella Hood PT  Sent: 8/14/2024   6:27 PM PDT  To: Dania Andrade R.N.; Radha Watson, OT; *      CARMELINA saini attempted to schedule evaluation for 8/15/24 but daughter requests to wait until next week.

## 2024-08-15 NOTE — CASE COMMUNICATION
fyi, PT attempted to schedule evaluation for 8/15/24 but daughter requests to wait until next week.

## 2024-08-15 NOTE — CASE COMMUNICATION
noted  ----- Message -----  From: Velma Joseph R.N.  Sent: 8/14/2024  12:00 PM PDT  To: Dania Andrade R.N.; Radha Watson, OT; *      Dtr called in to HH today, requesting nuext nurse out for visit to draw labs ordered by Dr Jacob end of day yesterday. Dtr does not have lab slip. At this time no lab orders have been recieved. Explained to her will have nurse look for lab order before visit Friday, but we cannot draw labs witho ut order.   Dtr also stated that MD yesterday ordered oxygen for pt. Nurse to address new oxygen at next visit.   Final request from dtr was for therapy to wait until early next week to schedule, as pt is still fatigued.

## 2024-08-16 ENCOUNTER — HOME CARE VISIT (OUTPATIENT)
Dept: HOME HEALTH SERVICES | Facility: HOME HEALTHCARE | Age: 84
End: 2024-08-16
Payer: MEDICARE

## 2024-08-16 PROCEDURE — G0495 RN CARE TRAIN/EDU IN HH: HCPCS

## 2024-08-17 VITALS
DIASTOLIC BLOOD PRESSURE: 78 MMHG | OXYGEN SATURATION: 92 % | HEART RATE: 85 BPM | SYSTOLIC BLOOD PRESSURE: 130 MMHG | TEMPERATURE: 98.5 F | RESPIRATION RATE: 20 BRPM

## 2024-08-17 ASSESSMENT — ENCOUNTER SYMPTOMS
STOOL FREQUENCY: DAILY
SKIN LESIONS: 1
MUSCLE WEAKNESS: 1
LAST BOWEL MOVEMENT: 67068
DYSPNEA ACTIVITY LEVEL: AFTER AMBULATING LESS THAN 10 FT
DENIES PAIN: 1
VOMITING: PT DENIES EMESIS AT THIS TIME.
PERSON REPORTING PAIN: PATIENT
NAUSEA: PT DENIES NAUSEA AT THIS TIME.
BOWEL PATTERN NORMAL: 1
DYSPNEA ON EXERTION: 1
DESCRIPTION OF MEMORY LOSS: SHORT TERM
SHORTNESS OF BREATH: 1
LIMITED RANGE OF MOTION: 1
FATIGUES EASILY: 1

## 2024-08-17 ASSESSMENT — ACTIVITIES OF DAILY LIVING (ADL)
CURRENT_FUNCTION: ONE PERSON
AMBULATION ASSISTANCE: ONE PERSON

## 2024-08-19 ENCOUNTER — APPOINTMENT (OUTPATIENT)
Dept: URGENT CARE | Facility: CLINIC | Age: 84
End: 2024-08-19
Payer: MEDICARE

## 2024-08-19 ENCOUNTER — HOSPITAL ENCOUNTER (OUTPATIENT)
Dept: CARDIOLOGY | Facility: MEDICAL CENTER | Age: 84
End: 2024-08-19
Attending: FAMILY MEDICINE
Payer: MEDICARE

## 2024-08-19 ENCOUNTER — HOME CARE VISIT (OUTPATIENT)
Dept: HOME HEALTH SERVICES | Facility: HOME HEALTHCARE | Age: 84
End: 2024-08-19
Payer: MEDICARE

## 2024-08-19 VITALS
SYSTOLIC BLOOD PRESSURE: 110 MMHG | RESPIRATION RATE: 18 BRPM | OXYGEN SATURATION: 94 % | HEART RATE: 82 BPM | TEMPERATURE: 97.7 F | DIASTOLIC BLOOD PRESSURE: 74 MMHG

## 2024-08-19 DIAGNOSIS — I50.9 HEART FAILURE, ETIOLOGY UNKNOWN (HCC): ICD-10-CM

## 2024-08-19 DIAGNOSIS — I50.9 CHF (CONGESTIVE HEART FAILURE), NYHA CLASS I, UNSPECIFIED FAILURE CHRONICITY, UNSPECIFIED TYPE (HCC): ICD-10-CM

## 2024-08-19 PROCEDURE — G0152 HHCP-SERV OF OT,EA 15 MIN: HCPCS

## 2024-08-19 PROCEDURE — 93306 TTE W/DOPPLER COMPLETE: CPT

## 2024-08-19 ASSESSMENT — ACTIVITIES OF DAILY LIVING (ADL)
GROOMING_WITHIN_DEFINED_LIMITS: 1
SHOPPING: DEPENDENT
SPONGING_LB_CURRENT_FUNCTION: INDEPENDENT
TRANSPORTATION ASSESSED: 1
SHOPPING ASSESSED: 1
FEEDING_WITHIN_DEFINED_LIMITS: 1
ORAL_CARE_ASSESSED: 1
LIGHT HOUSEKEEPING: DEPENDENT
HOUSEKEEPING ASSESSED: 1
SPONGING_UB_CURRENT_FUNCTION: INDEPENDENT
CURRENT_FUNCTION: CONTACT GUARD ASSIST
PHYSICAL TRANSFERS ASSESSED: 1
TELEPHONE USE ASSESSED: 1
GROOMING_CURRENT_FUNCTION: INDEPENDENT
FEEDING: INDEPENDENT
DRESSING_UB_CURRENT_FUNCTION: INDEPENDENT
BATHING_CURRENT_FUNCTION: INDEPENDENT
AMBULATION ASSISTANCE: 1
BATHING ASSESSED: 1
PREPARING MEALS: INDEPENDENT
USING THE TELPHONE: INDEPENDENT
FEEDING ASSESSED: 1
TOILETING: INDEPENDENT
TRANSPORTATION: DEPENDENT
PHYSICAL_TRANSFERS_DEVICES: TO SHOWER
TOILETING: 1
GROOMING ASSESSED: 1
DRESSING_LB_CURRENT_FUNCTION: INDEPENDENT

## 2024-08-19 ASSESSMENT — ENCOUNTER SYMPTOMS
DENIES PAIN: 1
PERSON REPORTING PAIN: PATIENT

## 2024-08-19 NOTE — CASE COMMUNICATION
noted  ----- Message -----  From: Savannah Ryan C.N.A.  Sent: 8/17/2024   2:03 PM PDT  To: Dania Andrade R.N.      patient stil sick from [facility]

## 2024-08-19 NOTE — CASE COMMUNICATION
OT completed initial evaluation 8/19/24.  OT will see client 1W3 for transfers, ADLs, and safety.  Client states son will order her a tub transfer bench.

## 2024-08-20 ENCOUNTER — HOME CARE VISIT (OUTPATIENT)
Dept: HOME HEALTH SERVICES | Facility: HOME HEALTHCARE | Age: 84
End: 2024-08-20
Payer: MEDICARE

## 2024-08-20 VITALS
OXYGEN SATURATION: 95 % | DIASTOLIC BLOOD PRESSURE: 78 MMHG | SYSTOLIC BLOOD PRESSURE: 128 MMHG | RESPIRATION RATE: 18 BRPM | HEART RATE: 84 BPM | TEMPERATURE: 98.1 F

## 2024-08-20 LAB — LV EJECT FRACT  99904: 65

## 2024-08-20 PROCEDURE — G0299 HHS/HOSPICE OF RN EA 15 MIN: HCPCS

## 2024-08-20 PROCEDURE — 93306 TTE W/DOPPLER COMPLETE: CPT | Mod: 26 | Performed by: INTERNAL MEDICINE

## 2024-08-20 ASSESSMENT — ENCOUNTER SYMPTOMS
NAUSEA: PATIENT REPORTS NO NAUSEA
LIMITED RANGE OF MOTION: 1
PERSON REPORTING PAIN: PATIENT
VOMITING: PT STATED SHE HAS NOT HAD ANY EMESIS
STOOL FREQUENCY: DAILY
LAST BOWEL MOVEMENT: 67072
BOWEL PATTERN NORMAL: 1
DENIES PAIN: 1
DRY SKIN: 1
MUSCLE WEAKNESS: 1
DESCRIPTION OF MEMORY LOSS: SHORT TERM

## 2024-08-20 ASSESSMENT — ACTIVITIES OF DAILY LIVING (ADL)
AMBULATION ASSISTANCE: ONE PERSON
CURRENT_FUNCTION: ONE PERSON

## 2024-08-21 ENCOUNTER — HOME CARE VISIT (OUTPATIENT)
Dept: HOME HEALTH SERVICES | Facility: HOME HEALTHCARE | Age: 84
End: 2024-08-21
Payer: MEDICARE

## 2024-08-21 VITALS
DIASTOLIC BLOOD PRESSURE: 68 MMHG | TEMPERATURE: 97.9 F | OXYGEN SATURATION: 92 % | RESPIRATION RATE: 18 BRPM | SYSTOLIC BLOOD PRESSURE: 142 MMHG | HEART RATE: 78 BPM

## 2024-08-21 PROCEDURE — G0151 HHCP-SERV OF PT,EA 15 MIN: HCPCS

## 2024-08-21 ASSESSMENT — ACTIVITIES OF DAILY LIVING (ADL)
GROOMING_COMMENTS: SEE OT NOTES.
CURRENT_FUNCTION: INDEPENDENT
AMBULATION ASSISTANCE: NON-AMBULATORY
BATHING_COMMENTS: SEE OT NOTES.
FEEDING_COMMENTS: SEE OT NOTES.
PHYSICAL TRANSFERS ASSESSED: 1

## 2024-08-21 ASSESSMENT — ENCOUNTER SYMPTOMS
LOWEST PAIN SEVERITY IN PAST 24 HOURS: 0/10
DENIES PAIN: 1
HIGHEST PAIN SEVERITY IN PAST 24 HOURS: 0/10
PAIN SEVERITY GOAL: 0/10
PERSON REPORTING PAIN: PATIENT

## 2024-08-23 ENCOUNTER — HOME CARE VISIT (OUTPATIENT)
Dept: HOME HEALTH SERVICES | Facility: HOME HEALTHCARE | Age: 84
End: 2024-08-23
Payer: MEDICARE

## 2024-08-27 ENCOUNTER — HOME CARE VISIT (OUTPATIENT)
Dept: HOME HEALTH SERVICES | Facility: HOME HEALTHCARE | Age: 84
End: 2024-08-27
Payer: MEDICARE

## 2024-08-27 VITALS
HEART RATE: 84 BPM | OXYGEN SATURATION: 93 % | DIASTOLIC BLOOD PRESSURE: 72 MMHG | SYSTOLIC BLOOD PRESSURE: 128 MMHG | RESPIRATION RATE: 18 BRPM | TEMPERATURE: 98.6 F

## 2024-08-27 PROCEDURE — G0495 RN CARE TRAIN/EDU IN HH: HCPCS

## 2024-08-27 ASSESSMENT — ENCOUNTER SYMPTOMS
FATIGUE: 1
DESCRIPTION OF MEMORY LOSS: SHORT TERM
STOOL FREQUENCY: LESS THAN DAILY
FATIGUES EASILY: 1
DENIES PAIN: 1
LAST BOWEL MOVEMENT: 67079
DRY SKIN: 1
BOWEL PATTERN NORMAL: 1
LIMITED RANGE OF MOTION: 1
PERSON REPORTING PAIN: PATIENT
FORGETFULNESS: 1
MUSCLE WEAKNESS: 1

## 2024-08-27 ASSESSMENT — PATIENT HEALTH QUESTIONNAIRE - PHQ9: CLINICAL INTERPRETATION OF PHQ2 SCORE: 0

## 2024-08-27 ASSESSMENT — ACTIVITIES OF DAILY LIVING (ADL): AMBULATION ASSISTANCE: NON-AMBULATORY

## 2024-08-29 ENCOUNTER — HOME CARE VISIT (OUTPATIENT)
Dept: HOME HEALTH SERVICES | Facility: HOME HEALTHCARE | Age: 84
End: 2024-08-29
Payer: MEDICARE

## 2024-08-29 VITALS
SYSTOLIC BLOOD PRESSURE: 128 MMHG | TEMPERATURE: 98.7 F | RESPIRATION RATE: 18 BRPM | DIASTOLIC BLOOD PRESSURE: 74 MMHG | HEART RATE: 95 BPM | OXYGEN SATURATION: 96 %

## 2024-08-29 PROCEDURE — G0152 HHCP-SERV OF OT,EA 15 MIN: HCPCS

## 2024-08-29 ASSESSMENT — ACTIVITIES OF DAILY LIVING (ADL)
PHYSICAL TRANSFERS ASSESSED: 1
TOILETING: INDEPENDENT
DRESSING_UB_CURRENT_FUNCTION: INDEPENDENT
ORAL_CARE_ASSESSED: 1
LIGHT HOUSEKEEPING: DEPENDENT
TRANSPORTATION ASSESSED: 1
TELEPHONE USE ASSESSED: 1
USING THE TELPHONE: INDEPENDENT
HOUSEKEEPING ASSESSED: 1
FEEDING: INDEPENDENT
BATHING ASSESSED: 1
TRANSPORTATION: INDEPENDENT
PREPARING MEALS: INDEPENDENT
GROOMING_CURRENT_FUNCTION: INDEPENDENT
FEEDING ASSESSED: 1
SHOPPING ASSESSED: 1
TOILETING: 1
BATHING_CURRENT_FUNCTION: INDEPENDENT
DRESSING_LB_CURRENT_FUNCTION: INDEPENDENT
ORAL_CARE_CURRENT_FUNCTION: INDEPENDENT
SHOPPING: DEPENDENT
GROOMING ASSESSED: 1
AMBULATION ASSISTANCE: 1
LAUNDRY ASSESSED: 1

## 2024-08-29 ASSESSMENT — ENCOUNTER SYMPTOMS
DENIES PAIN: 1
PERSON REPORTING PAIN: PATIENT
DIFFICULTY THINKING: 1

## 2024-08-29 NOTE — CASE COMMUNICATION
OT DISCHAGE SUMMARY:  The patient was seen for 2 home health occupational  therapy sessions.  Goals not met due to daughter requested DC.  States client will not use bathroom DME that was suggested.  .  The patient was discharged to self care  .  STATUS AT DISCHARGE:  Ambulation and Mobility: Max Assist  Patient Equipment: wheelchair for ambulation.  Transferring: Min Assist  Bathing: Independent sponge baths  Dressing: Independent  Spe cial equipment used: WC  Medication management: Able to take independently  Frequency of pain interfering with activity or movement: (drop downs)  - No pain  When is the patient dyspneic or noticeably Short of Breath: (drop downs)  - When walking more than 20 feet/stairs

## 2024-08-30 ENCOUNTER — HOME CARE VISIT (OUTPATIENT)
Dept: HOME HEALTH SERVICES | Facility: HOME HEALTHCARE | Age: 84
End: 2024-08-30
Payer: MEDICARE

## 2024-08-30 PROCEDURE — G0493 RN CARE EA 15 MIN HH/HOSPICE: HCPCS

## 2024-08-30 PROCEDURE — G0156 HHCP-SVS OF AIDE,EA 15 MIN: HCPCS

## 2024-08-31 VITALS
HEART RATE: 80 BPM | SYSTOLIC BLOOD PRESSURE: 120 MMHG | OXYGEN SATURATION: 95 % | TEMPERATURE: 99.8 F | RESPIRATION RATE: 18 BRPM | DIASTOLIC BLOOD PRESSURE: 70 MMHG

## 2024-08-31 SDOH — ECONOMIC STABILITY: FOOD INSECURITY: MEALS PER DAY: 2

## 2024-08-31 ASSESSMENT — ENCOUNTER SYMPTOMS
FATIGUE: 1
FATIGUES EASILY: 1
APPETITE LEVEL: FAIR
LAST BOWEL MOVEMENT: 67079
FORGETFULNESS: 1
STOOL FREQUENCY: LESS THAN DAILY
DYSPNEA ON EXERTION: 1
DENIES PAIN: 1
CHANGE IN APPETITE: DECREASED
BOWEL PATTERN NORMAL: 1
DRY SKIN: 1

## 2024-08-31 ASSESSMENT — ACTIVITIES OF DAILY LIVING (ADL)
OASIS_M1830: 05
HOME_HEALTH_OASIS: 01

## 2024-09-01 ENCOUNTER — HOME CARE VISIT (OUTPATIENT)
Dept: HOME HEALTH SERVICES | Facility: HOME HEALTHCARE | Age: 84
End: 2024-09-01
Payer: MEDICARE

## 2024-09-01 NOTE — CASE COMMUNICATION
Quality Review for 8.13.24 SOC OASIS performed on by NIRANJAN Agosto RN on 9.1.2024:  Edits completed by NIRANJAN Agosto RN:  1.  and  dx coding updated per chart review.   2. Changed  to 5 and  to 3, [id number] R, S to 3 per narrative pt needs mod assist with WC and transfers.   3. Changed [id number] to 09, pt uses WC as PLOF  4. Added low Na diet to nutritional requirements  5.  Updated F2F data  6. Changed  to 3

## 2024-09-02 NOTE — CASE COMMUNICATION
I agree with these changes.   ----- Message -----  From: Sanjana Agosto R.N.  Sent: 9/1/2024   3:06 PM PDT  To: Siena Ramsay R.N.      Quality Review for 8.13.24 SOC OASIS performed on by NIRANJAN Agosto RN on 9.1.2024:  Edits completed by NIRANJAN Agosto RN:  1.  and  dx coding updated per chart review.   2. Changed  to 5 and  to 3, [id number] R, S to 3 per narrative pt needs mod assist with WC and transfers.   3. Changed  0B to 09, pt uses WC as PLOF  4. Added low Na diet to nutritional requirements  5.  Updated F2F data  6. Changed  to 3

## 2024-09-03 ENCOUNTER — HOME CARE VISIT (OUTPATIENT)
Dept: HOME HEALTH SERVICES | Facility: HOME HEALTHCARE | Age: 84
End: 2024-09-03
Payer: MEDICARE

## 2024-09-03 ASSESSMENT — ENCOUNTER SYMPTOMS
DENIES PAIN: 1
DIFFICULTY THINKING: 1
PERSON REPORTING PAIN: PATIENT

## 2024-09-03 ASSESSMENT — ACTIVITIES OF DAILY LIVING (ADL)
HOME_HEALTH_OASIS: 01
OASIS_M1830: 04

## 2024-09-04 ENCOUNTER — HOME HEALTH ADMISSION (OUTPATIENT)
Dept: HOME HEALTH SERVICES | Facility: HOME HEALTHCARE | Age: 84
End: 2024-09-04
Payer: MEDICARE

## 2024-09-04 NOTE — CASE COMMUNICATION
Updated 9.4.24    Quality Review for 8.30.24 DC OASIS performed on by NIRANJAN Agosto RN on 9.3.2024:    Edits completed by NIRANJAN Agosto RN:  1. Changed  to 8, - to 8, and  to 8 as this was an office dc  2. Changed  to 1,  to 4,  to 1,  to 4, , , ,  to 0,  to 0,  to 0, [id number] S-H to 6, [id number] D-F to 06 [id number] I, M, P to 09, [id number] R, S to 6,  to 0,  to 1, per narrati ve from OT discipline dc/last in person visit   3. Changed  to D per POC pt was on a low cholesterol diet  4. Changed  D,E to yes per POC  5. Discussed narrative with OT, updated narrative after discussion using the OT discipline note.

## 2024-09-04 NOTE — CASE COMMUNICATION
OT agrees with the edits made.  Thank you.      ----- Message -----  From: Sanjana Agosto R.N.  Sent: 9/4/2024   8:48 AM PDT  To: Radha Watson OT      Updated 9.4.24    Quality Review for 8.30.24 DC OASIS performed on by NIRANJAN Agosto RN on 9.3.2024:    Edits completed by NIRANJAN Agosto RN:  1. Changed  to 8, - to 8, and  to 8 as this was an office dc  2. Changed  to 1,  to 4,  to 1,  to 4, , M18 10, ,  to 0,  to 0,  to 0, [id number] S-H to 6, [id number] D-F to 06 [id number] I, M, P to 09, [id number] R, S to 6,  to 0,  to 1, per narrative from OT discipline dc/last in person visit   3. Changed  to D per POC pt was on a low cholesterol diet  4. Changed  D,E to yes per POC  5. Discussed narrative with OT, updated narrative after discussion using the OT discipline note.

## 2024-09-04 NOTE — CASE COMMUNICATION
Quality Review for 8.30.24 DC OASIS performed on by NIRANJAN Agosto RN on 9.3.2024:    Edits completed by NIRANJAN Agosto RN:  1. Changed  to 8, - to 8, and  to 8 as this was an office dc  2. Changed  to 1,  to 4,  to 1,  to 4, , , ,  to 0,  to 0,  to 0, [id number] S-H to 6, [id number] D-F to 06 [id number] I, M, P to 09, [id number] R, S to 6,  to 0,  to 1,  per narrative from OT discip line dc/last in person visit   3. Changed  to D per POC pt was on a low cholesterol diet  4. Changed  D,E to yes per POC

## 2024-09-04 NOTE — CASE COMMUNICATION
OT agrees with edits made to this chart.      ----- Message -----  From: Sanjana Agosto R.N.  Sent: 9/3/2024   5:33 PM PDT  To: Radha Watson OT      Quality Review for 8.30.24 DC OASIS performed on by NIRANJAN Agosto RN on 9.3.2024:    Edits completed by NIRANJAN Agosto RN:  1. Changed  to 8, - to 8, and  to 8 as this was an office dc  2. Changed  to 1,  to 4,  to 1,  to 4, , , ,  to  0,  to 0,  to 0, [id number] S-H to 6, [id number] D-F to 06 [id number] I, M, P to 09, [id number] R, S to 6,  to 0,  to 1,  per narrative from OT discipline dc/last in person visit   3. Changed  to D per POC pt was on a low cholesterol diet  4. Changed  D,E to yes per POC

## 2024-09-07 ENCOUNTER — APPOINTMENT (OUTPATIENT)
Dept: RADIOLOGY | Facility: MEDICAL CENTER | Age: 84
DRG: 177 | End: 2024-09-07
Attending: EMERGENCY MEDICINE
Payer: MEDICARE

## 2024-09-07 ENCOUNTER — HOSPITAL ENCOUNTER (INPATIENT)
Facility: MEDICAL CENTER | Age: 84
LOS: 3 days | DRG: 177 | End: 2024-09-10
Attending: EMERGENCY MEDICINE | Admitting: HOSPITALIST
Payer: MEDICARE

## 2024-09-07 DIAGNOSIS — S83.91XA SPRAIN OF RIGHT KNEE, UNSPECIFIED LIGAMENT, INITIAL ENCOUNTER: ICD-10-CM

## 2024-09-07 DIAGNOSIS — J44.1 ACUTE EXACERBATION OF CHRONIC OBSTRUCTIVE PULMONARY DISEASE (COPD) (HCC): ICD-10-CM

## 2024-09-07 DIAGNOSIS — R53.1 WEAKNESS: ICD-10-CM

## 2024-09-07 DIAGNOSIS — U09.9 COVID-19 LONG HAULER: ICD-10-CM

## 2024-09-07 DIAGNOSIS — U07.1 COVID-19 VIRUS INFECTION: ICD-10-CM

## 2024-09-07 DIAGNOSIS — R09.02 HYPOXIA: ICD-10-CM

## 2024-09-07 DIAGNOSIS — A80.9 ACUTE POLIOMYELITIS: ICD-10-CM

## 2024-09-07 DIAGNOSIS — J96.01 ACUTE RESPIRATORY FAILURE WITH HYPOXEMIA (HCC): ICD-10-CM

## 2024-09-07 PROBLEM — K59.04 CHRONIC IDIOPATHIC CONSTIPATION: Status: ACTIVE | Noted: 2024-09-07

## 2024-09-07 PROBLEM — R74.8 ELEVATED ALKALINE PHOSPHATASE LEVEL: Status: ACTIVE | Noted: 2024-09-07

## 2024-09-07 PROBLEM — Z66 DNR (DO NOT RESUSCITATE): Status: ACTIVE | Noted: 2024-09-07

## 2024-09-07 LAB
ALBUMIN SERPL BCP-MCNC: 4 G/DL (ref 3.2–4.9)
ALBUMIN/GLOB SERPL: 1.5 G/DL
ALP SERPL-CCNC: 103 U/L (ref 30–99)
ALT SERPL-CCNC: 11 U/L (ref 2–50)
ANION GAP SERPL CALC-SCNC: 9 MMOL/L (ref 7–16)
AST SERPL-CCNC: 12 U/L (ref 12–45)
BASOPHILS # BLD AUTO: 0.6 % (ref 0–1.8)
BASOPHILS # BLD: 0.05 K/UL (ref 0–0.12)
BILIRUB SERPL-MCNC: 0.6 MG/DL (ref 0.1–1.5)
BUN SERPL-MCNC: 12 MG/DL (ref 8–22)
CALCIUM ALBUM COR SERPL-MCNC: 9 MG/DL (ref 8.5–10.5)
CALCIUM SERPL-MCNC: 9 MG/DL (ref 8.4–10.2)
CHLORIDE SERPL-SCNC: 99 MMOL/L (ref 96–112)
CO2 SERPL-SCNC: 27 MMOL/L (ref 20–33)
CREAT SERPL-MCNC: 0.54 MG/DL (ref 0.5–1.4)
D DIMER PPP IA.FEU-MCNC: 1.04 UG/ML (FEU) (ref 0–0.5)
EKG IMPRESSION: NORMAL
EOSINOPHIL # BLD AUTO: 0.33 K/UL (ref 0–0.51)
EOSINOPHIL NFR BLD: 4.2 % (ref 0–6.9)
ERYTHROCYTE [DISTWIDTH] IN BLOOD BY AUTOMATED COUNT: 50.9 FL (ref 35.9–50)
FLUAV RNA SPEC QL NAA+PROBE: NEGATIVE
FLUBV RNA SPEC QL NAA+PROBE: NEGATIVE
GFR SERPLBLD CREATININE-BSD FMLA CKD-EPI: 90 ML/MIN/1.73 M 2
GLOBULIN SER CALC-MCNC: 2.7 G/DL (ref 1.9–3.5)
GLUCOSE SERPL-MCNC: 96 MG/DL (ref 65–99)
HCT VFR BLD AUTO: 50.2 % (ref 37–47)
HGB BLD-MCNC: 16.4 G/DL (ref 12–16)
IMM GRANULOCYTES # BLD AUTO: 0.02 K/UL (ref 0–0.11)
IMM GRANULOCYTES NFR BLD AUTO: 0.3 % (ref 0–0.9)
LYMPHOCYTES # BLD AUTO: 1.63 K/UL (ref 1–4.8)
LYMPHOCYTES NFR BLD: 20.9 % (ref 22–41)
MAGNESIUM SERPL-MCNC: 2.1 MG/DL (ref 1.5–2.5)
MCH RBC QN AUTO: 30.9 PG (ref 27–33)
MCHC RBC AUTO-ENTMCNC: 32.7 G/DL (ref 32.2–35.5)
MCV RBC AUTO: 94.5 FL (ref 81.4–97.8)
MONOCYTES # BLD AUTO: 0.44 K/UL (ref 0–0.85)
MONOCYTES NFR BLD AUTO: 5.6 % (ref 0–13.4)
NEUTROPHILS # BLD AUTO: 5.34 K/UL (ref 1.82–7.42)
NEUTROPHILS NFR BLD: 68.4 % (ref 44–72)
NRBC # BLD AUTO: 0 K/UL
NRBC BLD-RTO: 0 /100 WBC (ref 0–0.2)
NT-PROBNP SERPL IA-MCNC: 519 PG/ML (ref 0–125)
PLATELET # BLD AUTO: 250 K/UL (ref 164–446)
PMV BLD AUTO: 10.6 FL (ref 9–12.9)
POTASSIUM SERPL-SCNC: 4.6 MMOL/L (ref 3.6–5.5)
PROT SERPL-MCNC: 6.7 G/DL (ref 6–8.2)
RBC # BLD AUTO: 5.31 M/UL (ref 4.2–5.4)
RSV RNA SPEC QL NAA+PROBE: NEGATIVE
SARS-COV-2 RNA RESP QL NAA+PROBE: DETECTED
SODIUM SERPL-SCNC: 135 MMOL/L (ref 135–145)
SPECIMEN SOURCE: ABNORMAL
TROPONIN T SERPL-MCNC: 11 NG/L (ref 6–19)
TSH SERPL DL<=0.005 MIU/L-ACNC: 2.25 UIU/ML (ref 0.38–5.33)
WBC # BLD AUTO: 7.8 K/UL (ref 4.8–10.8)

## 2024-09-07 PROCEDURE — 84484 ASSAY OF TROPONIN QUANT: CPT

## 2024-09-07 PROCEDURE — A9270 NON-COVERED ITEM OR SERVICE: HCPCS | Performed by: HOSPITALIST

## 2024-09-07 PROCEDURE — 36415 COLL VENOUS BLD VENIPUNCTURE: CPT

## 2024-09-07 PROCEDURE — 85025 COMPLETE CBC W/AUTO DIFF WBC: CPT

## 2024-09-07 PROCEDURE — 80053 COMPREHEN METABOLIC PANEL: CPT

## 2024-09-07 PROCEDURE — 84443 ASSAY THYROID STIM HORMONE: CPT

## 2024-09-07 PROCEDURE — 0241U HCHG SARS-COV-2 COVID-19 NFCT DS RESP RNA 4 TRGT MIC: CPT

## 2024-09-07 PROCEDURE — 85379 FIBRIN DEGRADATION QUANT: CPT

## 2024-09-07 PROCEDURE — 73562 X-RAY EXAM OF KNEE 3: CPT | Mod: RT

## 2024-09-07 PROCEDURE — 71275 CT ANGIOGRAPHY CHEST: CPT

## 2024-09-07 PROCEDURE — 770020 HCHG ROOM/CARE - TELE (206)

## 2024-09-07 PROCEDURE — 96374 THER/PROPH/DIAG INJ IV PUSH: CPT

## 2024-09-07 PROCEDURE — 83880 ASSAY OF NATRIURETIC PEPTIDE: CPT

## 2024-09-07 PROCEDURE — 94760 N-INVAS EAR/PLS OXIMETRY 1: CPT

## 2024-09-07 PROCEDURE — 99223 1ST HOSP IP/OBS HIGH 75: CPT | Mod: AI | Performed by: HOSPITALIST

## 2024-09-07 PROCEDURE — 700102 HCHG RX REV CODE 250 W/ 637 OVERRIDE(OP): Performed by: HOSPITALIST

## 2024-09-07 PROCEDURE — 700111 HCHG RX REV CODE 636 W/ 250 OVERRIDE (IP): Mod: JZ | Performed by: HOSPITALIST

## 2024-09-07 PROCEDURE — 99285 EMERGENCY DEPT VISIT HI MDM: CPT

## 2024-09-07 PROCEDURE — 71045 X-RAY EXAM CHEST 1 VIEW: CPT

## 2024-09-07 PROCEDURE — 93005 ELECTROCARDIOGRAM TRACING: CPT | Performed by: EMERGENCY MEDICINE

## 2024-09-07 PROCEDURE — 83735 ASSAY OF MAGNESIUM: CPT

## 2024-09-07 PROCEDURE — 700117 HCHG RX CONTRAST REV CODE 255: Performed by: EMERGENCY MEDICINE

## 2024-09-07 RX ORDER — FUROSEMIDE 10 MG/ML
40 INJECTION INTRAMUSCULAR; INTRAVENOUS EVERY 8 HOURS
Status: DISCONTINUED | OUTPATIENT
Start: 2024-09-07 | End: 2024-09-07

## 2024-09-07 RX ORDER — THYROID 30 MG/1
45 TABLET ORAL DAILY
Status: DISCONTINUED | OUTPATIENT
Start: 2024-09-07 | End: 2024-09-10 | Stop reason: HOSPADM

## 2024-09-07 RX ORDER — ALBUTEROL SULFATE 90 UG/1
2 INHALANT RESPIRATORY (INHALATION) EVERY 4 HOURS PRN
Status: DISCONTINUED | OUTPATIENT
Start: 2024-09-07 | End: 2024-09-10 | Stop reason: HOSPADM

## 2024-09-07 RX ORDER — ASCORBIC ACID 500 MG
1000 TABLET ORAL DAILY
Status: DISCONTINUED | OUTPATIENT
Start: 2024-09-07 | End: 2024-09-10 | Stop reason: HOSPADM

## 2024-09-07 RX ORDER — LACTULOSE 10 G/15ML
15 SOLUTION ORAL 2 TIMES DAILY
Status: DISCONTINUED | OUTPATIENT
Start: 2024-09-07 | End: 2024-09-10 | Stop reason: HOSPADM

## 2024-09-07 RX ORDER — ALBUTEROL SULFATE 5 MG/ML
2.5 SOLUTION RESPIRATORY (INHALATION) ONCE
Status: DISCONTINUED | OUTPATIENT
Start: 2024-09-07 | End: 2024-09-07

## 2024-09-07 RX ORDER — ENOXAPARIN SODIUM 100 MG/ML
40 INJECTION SUBCUTANEOUS DAILY
Status: DISCONTINUED | OUTPATIENT
Start: 2024-09-07 | End: 2024-09-09

## 2024-09-07 RX ORDER — LISINOPRIL 5 MG/1
10 TABLET ORAL
Status: DISCONTINUED | OUTPATIENT
Start: 2024-09-07 | End: 2024-09-10 | Stop reason: HOSPADM

## 2024-09-07 RX ORDER — VITAMIN B COMPLEX
1000 TABLET ORAL DAILY
Status: DISCONTINUED | OUTPATIENT
Start: 2024-09-07 | End: 2024-09-10 | Stop reason: HOSPADM

## 2024-09-07 RX ORDER — ONDANSETRON 4 MG/1
4 TABLET, ORALLY DISINTEGRATING ORAL EVERY 4 HOURS PRN
Status: DISCONTINUED | OUTPATIENT
Start: 2024-09-07 | End: 2024-09-10 | Stop reason: HOSPADM

## 2024-09-07 RX ORDER — CARVEDILOL 6.25 MG/1
6.25 TABLET ORAL 2 TIMES DAILY WITH MEALS
Status: DISCONTINUED | OUTPATIENT
Start: 2024-09-07 | End: 2024-09-07

## 2024-09-07 RX ORDER — ZINC SULFATE 50(220)MG
220 CAPSULE ORAL DAILY
Status: DISCONTINUED | OUTPATIENT
Start: 2024-09-07 | End: 2024-09-10 | Stop reason: HOSPADM

## 2024-09-07 RX ORDER — DEXAMETHASONE SODIUM PHOSPHATE 4 MG/ML
6 INJECTION, SOLUTION INTRA-ARTICULAR; INTRALESIONAL; INTRAMUSCULAR; INTRAVENOUS; SOFT TISSUE DAILY
Status: DISCONTINUED | OUTPATIENT
Start: 2024-09-07 | End: 2024-09-10 | Stop reason: HOSPADM

## 2024-09-07 RX ORDER — ACETAMINOPHEN 325 MG/1
650 TABLET ORAL EVERY 6 HOURS PRN
Status: DISCONTINUED | OUTPATIENT
Start: 2024-09-07 | End: 2024-09-10 | Stop reason: HOSPADM

## 2024-09-07 RX ORDER — HYDRALAZINE HYDROCHLORIDE 20 MG/ML
10 INJECTION INTRAMUSCULAR; INTRAVENOUS EVERY 4 HOURS PRN
Status: DISCONTINUED | OUTPATIENT
Start: 2024-09-07 | End: 2024-09-10 | Stop reason: HOSPADM

## 2024-09-07 RX ORDER — ONDANSETRON 2 MG/ML
4 INJECTION INTRAMUSCULAR; INTRAVENOUS EVERY 4 HOURS PRN
Status: DISCONTINUED | OUTPATIENT
Start: 2024-09-07 | End: 2024-09-10 | Stop reason: HOSPADM

## 2024-09-07 RX ADMIN — ENOXAPARIN SODIUM 40 MG: 100 INJECTION SUBCUTANEOUS at 17:08

## 2024-09-07 RX ADMIN — DEXAMETHASONE SODIUM PHOSPHATE 6 MG: 4 INJECTION INTRA-ARTICULAR; INTRALESIONAL; INTRAMUSCULAR; INTRAVENOUS; SOFT TISSUE at 13:47

## 2024-09-07 RX ADMIN — ALBUTEROL SULFATE 2 PUFF: 90 AEROSOL, METERED RESPIRATORY (INHALATION) at 13:47

## 2024-09-07 RX ADMIN — LISINOPRIL 10 MG: 5 TABLET ORAL at 13:47

## 2024-09-07 RX ADMIN — LACTULOSE 15 ML: 20 SOLUTION ORAL at 17:07

## 2024-09-07 RX ADMIN — IOHEXOL 75 ML: 350 INJECTION, SOLUTION INTRAVENOUS at 10:32

## 2024-09-07 ASSESSMENT — ENCOUNTER SYMPTOMS
BRUISES/BLEEDS EASILY: 0
FALLS: 1
HEMOPTYSIS: 0
EYES NEGATIVE: 1
NEUROLOGICAL NEGATIVE: 1
WHEEZING: 0
NERVOUS/ANXIOUS: 0
DOUBLE VISION: 0
BLOOD IN STOOL: 0
CARDIOVASCULAR NEGATIVE: 1
HEADACHES: 0
SEIZURES: 0
PALPITATIONS: 0
SHORTNESS OF BREATH: 1
FOCAL WEAKNESS: 0
ABDOMINAL PAIN: 0
HEARTBURN: 0
NAUSEA: 0
DIARRHEA: 0
CHILLS: 0
SORE THROAT: 0
BLURRED VISION: 0
FEVER: 0
MEMORY LOSS: 0
PSYCHIATRIC NEGATIVE: 1
DIZZINESS: 0
DEPRESSION: 0
CONSTITUTIONAL NEGATIVE: 1
VOMITING: 0
CONSTIPATION: 1
LOSS OF CONSCIOUSNESS: 0
DIAPHORESIS: 0

## 2024-09-07 ASSESSMENT — COGNITIVE AND FUNCTIONAL STATUS - GENERAL
EATING MEALS: A LITTLE
TURNING FROM BACK TO SIDE WHILE IN FLAT BAD: A LITTLE
WALKING IN HOSPITAL ROOM: A LITTLE
DRESSING REGULAR LOWER BODY CLOTHING: A LITTLE
SUGGESTED CMS G CODE MODIFIER MOBILITY: CK
TOILETING: A LOT
MOBILITY SCORE: 18
PERSONAL GROOMING: A LITTLE
DRESSING REGULAR UPPER BODY CLOTHING: A LITTLE
STANDING UP FROM CHAIR USING ARMS: A LITTLE
DAILY ACTIVITIY SCORE: 17
CLIMB 3 TO 5 STEPS WITH RAILING: A LITTLE
MOVING TO AND FROM BED TO CHAIR: A LITTLE
MOVING FROM LYING ON BACK TO SITTING ON SIDE OF FLAT BED: A LITTLE
SUGGESTED CMS G CODE MODIFIER DAILY ACTIVITY: CK
HELP NEEDED FOR BATHING: A LITTLE

## 2024-09-07 ASSESSMENT — FIBROSIS 4 INDEX
FIB4 SCORE: 1.22
FIB4 SCORE: 1.23

## 2024-09-07 ASSESSMENT — LIFESTYLE VARIABLES
DOES PATIENT WANT TO STOP DRINKING: NO
SUBSTANCE_ABUSE: 0
CONSUMPTION TOTAL: NEGATIVE
EVER HAD A DRINK FIRST THING IN THE MORNING TO STEADY YOUR NERVES TO GET RID OF A HANGOVER: NO
ALCOHOL_USE: NO
HAVE PEOPLE ANNOYED YOU BY CRITICIZING YOUR DRINKING: NO
ON A TYPICAL DAY WHEN YOU DRINK ALCOHOL HOW MANY DRINKS DO YOU HAVE: 0
TOTAL SCORE: 0
EVER FELT BAD OR GUILTY ABOUT YOUR DRINKING: NO
TOTAL SCORE: 0
TOTAL SCORE: 0
HOW MANY TIMES IN THE PAST YEAR HAVE YOU HAD 5 OR MORE DRINKS IN A DAY: 0
HAVE YOU EVER FELT YOU SHOULD CUT DOWN ON YOUR DRINKING: NO
AVERAGE NUMBER OF DAYS PER WEEK YOU HAVE A DRINK CONTAINING ALCOHOL: 0

## 2024-09-07 ASSESSMENT — COPD QUESTIONNAIRES
HAVE YOU SMOKED AT LEAST 100 CIGARETTES IN YOUR ENTIRE LIFE: NO/DON'T KNOW
COPD SCREENING SCORE: 1
DURING THE PAST 4 WEEKS HOW MUCH DID YOU FEEL SHORT OF BREATH: NONE/LITTLE OF THE TIME
DO YOU EVER COUGH UP ANY MUCUS OR PHLEGM?: NO/ONLY WITH OCCASIONAL COLDS OR INFECTIONS

## 2024-09-07 ASSESSMENT — SOCIAL DETERMINANTS OF HEALTH (SDOH)
WITHIN THE LAST YEAR, HAVE YOU BEEN HUMILIATED OR EMOTIONALLY ABUSED IN OTHER WAYS BY YOUR PARTNER OR EX-PARTNER?: NO
WITHIN THE PAST 12 MONTHS, THE FOOD YOU BOUGHT JUST DIDN'T LAST AND YOU DIDN'T HAVE MONEY TO GET MORE: NEVER TRUE
WITHIN THE LAST YEAR, HAVE TO BEEN RAPED OR FORCED TO HAVE ANY KIND OF SEXUAL ACTIVITY BY YOUR PARTNER OR EX-PARTNER?: NO
WITHIN THE PAST 12 MONTHS, YOU WORRIED THAT YOUR FOOD WOULD RUN OUT BEFORE YOU GOT THE MONEY TO BUY MORE: NEVER TRUE
WITHIN THE LAST YEAR, HAVE YOU BEEN KICKED, HIT, SLAPPED, OR OTHERWISE PHYSICALLY HURT BY YOUR PARTNER OR EX-PARTNER?: NO
WITHIN THE LAST YEAR, HAVE YOU BEEN AFRAID OF YOUR PARTNER OR EX-PARTNER?: NO
IN THE PAST 12 MONTHS, HAS THE ELECTRIC, GAS, OIL, OR WATER COMPANY THREATENED TO SHUT OFF SERVICE IN YOUR HOME?: NO

## 2024-09-07 ASSESSMENT — VISUAL ACUITY: OU: 1

## 2024-09-07 ASSESSMENT — PAIN DESCRIPTION - PAIN TYPE: TYPE: ACUTE PAIN

## 2024-09-07 NOTE — ASSESSMENT & PLAN NOTE
Patient has chronic idiopathic constipation may be related to her polio  Patient at home has tried magnesium citrate and other over-the-counter remedies but she says it has not worked.  I am going to initiate her on lactulose 15 mL twice daily

## 2024-09-07 NOTE — ASSESSMENT & PLAN NOTE
Discussed advanced directives with the patient and her son at bedside and she wishes to be DO NOT RESUSCITATE CODE STATUS.

## 2024-09-07 NOTE — H&P
Hospital Medicine History & Physical Note    Date of Service  9/7/2024    Primary Care Physician  Guicho HANEY M.D.    Consultants  None    Specialist Names: None    Code Status  DNAR/DNI    Chief Complaint  Chief Complaint   Patient presents with    Weakness    Shortness of Breath    Knee Pain       History of Presenting Illness  Norah Mchugh is a 84 y.o. female who presented 9/7/2024 with shortness of breath.  Patient on August 5 was identified to have COVID-19 infection.  At that point she was admitted treated and then discharged home.  The patient did relatively well at home however now has relapsed.  The patient now has become extremely short of breath.  She has diffuse rhonchi in her lung and diminished breath sounds.  The patient is breathing fast and hard.  She is requiring oxygen to keep her above 90%.  Initially room air she was only 85%.  At this point patient will need Decadron.  She will need PT OT evaluation.  She in the meantime has sprained her right knee slipping out of bed as she does have polio since age 5.  The patient usually utilizes a wheelchair and she is very strong in her upper extremities and able to transfer independently but now she is not able to do that ever since the COVID infection.  Patient would be a good candidate for rehab.  At this point we have requested rehab evaluation..    I discussed the plan of care with patient, family, bedside RN, and emergency room physician Dr. Richardson .    Review of Systems  Review of Systems   Constitutional: Negative.  Negative for chills, diaphoresis and fever.   HENT: Negative.  Negative for hearing loss, nosebleeds and sore throat.    Eyes: Negative.  Negative for blurred vision and double vision.   Respiratory:  Positive for shortness of breath. Negative for hemoptysis and wheezing.    Cardiovascular: Negative.  Negative for chest pain, palpitations and leg swelling.   Gastrointestinal:  Positive for constipation. Negative  for abdominal pain, blood in stool, diarrhea, heartburn, nausea and vomiting.   Genitourinary: Negative.  Negative for frequency, hematuria and urgency.   Musculoskeletal:  Positive for falls and joint pain (Right knee pain).   Skin: Negative.  Negative for itching and rash.   Neurological: Negative.  Negative for dizziness, focal weakness, seizures, loss of consciousness and headaches.   Endo/Heme/Allergies: Negative.  Does not bruise/bleed easily.   Psychiatric/Behavioral: Negative.  Negative for depression, memory loss, substance abuse and suicidal ideas. The patient is not nervous/anxious.    All other systems reviewed and are negative.      Past Medical History   has a past medical history of CATARACT, Emphysema lung (HCC), Glaucoma, Hypertension, Pain (05/18/2012), Polio, and Unspecified disorder of thyroid.    Surgical History   has a past surgical history that includes joint injection diagnostic (5/24/2012); other; bunionectomy; other orthopedic surgery (2007); other orthopedic surgery (2002); incision and drainage orthopedic (8/30/2012); cataract phaco with iol (2/26/2013); and hip arthroplasty total (7/26/2012).     Family History  family history includes Cancer in her sister; Heart Disease in her mother; Other in her brother and father; Psychiatric Illness in her brother and father.   Family history reviewed with patient. There is family history that is pertinent to the chief complaint.     Social History   reports that she quit smoking about 52 years ago. Her smoking use included cigarettes. She started smoking about 72 years ago. She has a 40 pack-year smoking history. She has never used smokeless tobacco. She reports that she does not currently use alcohol after a past usage of about 8.4 oz of alcohol per week. She reports that she does not use drugs.    Allergies  Allergies   Allergen Reactions    Other Drug Nausea     An antibiotic, name unknown    Milk Protein Nausea       Medications  Prior to  Admission Medications   Prescriptions Last Dose Informant Patient Reported? Taking?   Magnesium Citrate 1.745 GM/30ML Solution 9/6/2024 at am Patient Yes Yes   Sig: Take 30 mL by mouth 1 time a day as needed (constipation). Indications: Constipation   VITAMIN D PO 9/6/2024 at am Patient Yes Yes   Sig: Take 1 Tablet by mouth every day.   acetaminophen (TYLENOL) 500 MG Tab 9/6/2024 at am Patient Yes Yes   Sig: Take 500 mg by mouth every 6 hours as needed for Mild Pain or Moderate Pain. Indications: Pain   albuterol 108 (90 Base) MCG/ACT Aero Soln inhalation aerosol 9/6/2024 at pm Patient No Yes   Sig: Inhale 2 Puffs every four hours as needed for Shortness of Breath.   thyroid (ARMOUR THYROID) 15 MG Tab 9/7/2024 at am Patient Yes Yes   Sig: Take 45 mg by mouth every day. Indications: Underactive Thyroid      Facility-Administered Medications: None       Physical Exam  Temp:  [36.4 °C (97.6 °F)] 36.4 °C (97.6 °F)  Pulse:  [] 96  Resp:  [22] 22  BP: (186-188)/(73-86) 188/73  SpO2:  [87 %-96 %] 96 %  Blood Pressure : (!) 188/73   Temperature: 36.4 °C (97.6 °F)   Pulse: 96   Respiration: (!) 22   Pulse Oximetry: 96 %       Physical Exam  Vitals and nursing note reviewed. Exam conducted with a chaperone present.   Constitutional:       General: She is awake.      Appearance: Normal appearance. She is well-developed, well-groomed and underweight. She is ill-appearing.   HENT:      Head: Normocephalic and atraumatic.      Jaw: There is normal jaw occlusion. No trismus.      Salivary Glands: Right salivary gland is not tender. Left salivary gland is not tender.      Right Ear: External ear normal.      Left Ear: External ear normal.      Mouth/Throat:      Mouth: Mucous membranes are moist.      Pharynx: Oropharynx is clear.   Eyes:      General: Lids are normal. Vision grossly intact.      Extraocular Movements: Extraocular movements intact.      Conjunctiva/sclera: Conjunctivae normal.      Right eye: Right  conjunctiva is not injected. No exudate.     Left eye: Left conjunctiva is not injected. No exudate.     Pupils: Pupils are equal, round, and reactive to light.   Neck:      Thyroid: No thyroid mass.      Vascular: No hepatojugular reflux or JVD.      Trachea: No abnormal tracheal secretions or tracheal deviation.   Cardiovascular:      Rate and Rhythm: Normal rate and regular rhythm. Occasional Extrasystoles are present.     Pulses: Normal pulses.      Heart sounds: Normal heart sounds. No murmur heard.     No friction rub.   Pulmonary:      Effort: Pulmonary effort is normal. Tachypnea present.      Breath sounds: Decreased air movement present. Examination of the right-upper field reveals decreased breath sounds. Examination of the left-upper field reveals decreased breath sounds. Examination of the right-middle field reveals decreased breath sounds. Examination of the left-middle field reveals decreased breath sounds. Examination of the right-lower field reveals decreased breath sounds. Examination of the left-lower field reveals decreased breath sounds. Decreased breath sounds present. No wheezing or rhonchi.   Abdominal:      General: Abdomen is flat.      Palpations: Abdomen is soft.      Tenderness: There is no abdominal tenderness. There is no right CVA tenderness or left CVA tenderness.      Hernia: No hernia is present.   Musculoskeletal:      Cervical back: Full passive range of motion without pain, normal range of motion and neck supple. No rigidity. No muscular tenderness.      Right knee: Decreased range of motion. Tenderness present. Abnormal alignment.      Right lower leg: No edema.      Left lower leg: No edema.   Lymphadenopathy:      Head:      Right side of head: No submental adenopathy.      Left side of head: No submental adenopathy.      Cervical:      Right cervical: No superficial cervical adenopathy.     Left cervical: No superficial cervical adenopathy.      Upper Body:      Right upper  body: No supraclavicular adenopathy.      Left upper body: No supraclavicular adenopathy.   Skin:     General: Skin is warm and dry.      Capillary Refill: Capillary refill takes less than 2 seconds.      Coloration: Skin is not cyanotic or pale.      Findings: No abrasion or bruising.   Neurological:      General: No focal deficit present.      Mental Status: She is alert and oriented to person, place, and time. Mental status is at baseline.      GCS: GCS eye subscore is 4. GCS verbal subscore is 5. GCS motor subscore is 6.      Cranial Nerves: No cranial nerve deficit.      Sensory: No sensory deficit.      Motor: Motor function is intact.      Deep Tendon Reflexes:      Reflex Scores:       Tricep reflexes are 2+ on the right side and 2+ on the left side.       Bicep reflexes are 2+ on the right side and 2+ on the left side.       Brachioradialis reflexes are 2+ on the right side and 2+ on the left side.       Patellar reflexes are 2+ on the right side and 2+ on the left side.       Achilles reflexes are 2+ on the right side and 2+ on the left side.  Psychiatric:         Attention and Perception: Attention and perception normal.         Mood and Affect: Mood normal.         Speech: Speech normal.         Behavior: Behavior normal. Behavior is cooperative.         Thought Content: Thought content normal.         Cognition and Memory: Cognition and memory normal.         Judgment: Judgment normal.         Laboratory:  Recent Labs     09/07/24  0804   WBC 7.8   RBC 5.31   HEMOGLOBIN 16.4*   HEMATOCRIT 50.2*   MCV 94.5   MCH 30.9   MCHC 32.7   RDW 50.9*   PLATELETCT 250   MPV 10.6     Recent Labs     09/07/24  0804   SODIUM 135   POTASSIUM 4.6   CHLORIDE 99   CO2 27   GLUCOSE 96   BUN 12   CREATININE 0.54   CALCIUM 9.0     Recent Labs     09/07/24  0804   ALTSGPT 11   ASTSGOT 12   ALKPHOSPHAT 103*   TBILIRUBIN 0.6   GLUCOSE 96         Recent Labs     09/07/24  0804   NTPROBNP 519*         Recent Labs      09/07/24  0804   TROPONINT 11       Imaging:  CT-CTA CHEST PULMONARY ARTERY W/ RECONS   Final Result         1. No pulmonary embolism or other acute process.   2. Severe emphysema.            DX-KNEE 3 VIEWS RIGHT   Final Result         1. No acute process.   2. Moderate DJD.   3. Osteopenia.      DX-CHEST-PORTABLE (1 VIEW)   Final Result      No acute process. Emphysema.          X-Ray:  I have personally reviewed the images and compared with prior images.  EKG:  I have personally reviewed the images and compared with prior images.    Assessment/Plan:  Justification for Admission Status  I anticipate this patient will require at least two midnights for appropriate medical management, necessitating inpatient admission because patient is acute hypoxic respiratory failure with COVID long-hauler, patient will need at least 48 hours of inpatient management.    Patient will need a Telemetry bed on MEDICAL service .  The need is secondary to respiratory failure with hypoxia.    * Acute respiratory failure with hypoxemia (HCC)- (present on admission)  Assessment & Plan  Patient has been having shortness of breath since August 5.  Her shortness of breath has considerably improved for a while then gotten much worse.  Currently she is complaining of severity of breathing and patient was brought in by the son for evaluation.  Patient is still positive for COVID-19 infection.  The patient's initial infection was positive on August 5.  She is again positive for it on September 7.  Patient will need at this point continue oxygen support to keep oxygen saturations above 90%.    Right knee sprain- (present on admission)  Assessment & Plan  Patient recently has sprained her right knee this may be a combination of COVID infection, polio, with the weakness from the combination of things the patient has now injured her knee to the point where she has a hard time independently ambulating and doing things.  Prior to this she was pretty  independent with a wheelchair as she has had polio since age 5.  Patient will benefit from going to rehab and at this point physiatry and rehab consultation has been requested.    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  She has never had her vaccination for it  Patient most likely is a COVID long-hauler although the possibility of her getting a reinfection is not ruled out.  Initiate zinc vitamin C and vitamin D.    Chronic idiopathic constipation- (present on admission)  Assessment & Plan  Patient has chronic idiopathic constipation may be related to her polio  Patient at home has tried magnesium citrate and other over-the-counter remedies but she says it has not worked.  I am going to initiate her on lactulose 15 mL twice daily    Polycythemia- (present on admission)  Assessment & Plan  Chronic polycythemia currently does not need phlebotomy most recent H&H is 16.4 and 50.2    DNR (do not resuscitate)- (present on admission)  Assessment & Plan  Discussed advanced directives with the patient and her son at bedside and she wishes to be DO NOT RESUSCITATE CODE STATUS.    Elevated alkaline phosphatase level- (present on admission)  Assessment & Plan  Obtain a GGT level.    Acquired hypothyroidism- (present on admission)  Assessment & Plan  Continue [place] Thyroid 45 mg daily  Most recent TSH 0.868 and T3 was 3.86    Poliomyelitis (historic)- (present on admission)  Assessment & Plan  Patient was diagnosed with polio at age 5.  She has difficulties with transfers and lower extremity weakness.  She utilizes a wheelchair most of the time  Recently she fell and sprained her right knee and since then she has been having trouble getting back to her baseline.  The patient would benefit from rehab and thus physiatry consultation  has been requested and rehab referral has been really recommended.        VTE prophylaxis: SCDs/TEDs

## 2024-09-07 NOTE — ED PROVIDER NOTES
ED Provider Note    CHIEF COMPLAINT  Chief Complaint   Patient presents with    Weakness    Shortness of Breath    Knee Pain       EXTERNAL RECORDS REVIEWED  PDMP no active prescriptions for narcotics or sedatives    HPI/ROS  LIMITATION TO HISTORY   Select: : None  OUTSIDE HISTORIAN(S):  Family patient's son at bedside for discussion history and symptoms    Norah Mchugh is a 84 y.o. female who presents accompanied by her son, who has noticed her weakness to be worse in the last 1 week.  She has had a history of polio since a child, typically her routine involves self transfer to a wheelchair, going to the bathroom and returning in wheelchair and getting her self back into bed.  She has been slumping into the chair and at one point slid to the ground yesterday.  She states she is uninjured.  Typically does not require oxygen, today found to be hypoxic and placed on supplemental O2.  Patient denies chest pain or history of cardiac disease.  She has history of emphysema secondary to previous smoking history.  No head or neck pain.  Her right knee was injured in the fall yesterday.  No fever or chills.  Patient was diagnosed with COVID early last month, August 5.    PAST MEDICAL HISTORY   has a past medical history of CATARACT, Emphysema lung (HCC), Glaucoma, Hypertension, Pain (05/18/2012), Polio, and Unspecified disorder of thyroid.    SURGICAL HISTORY   has a past surgical history that includes joint injection diagnostic (5/24/2012); other; bunionectomy; other orthopedic surgery (2007); other orthopedic surgery (2002); incision and drainage orthopedic (8/30/2012); cataract phaco with iol (2/26/2013); and hip arthroplasty total (7/26/2012).    FAMILY HISTORY  Family History   Problem Relation Age of Onset    Heart Disease Mother     Other Father         suicide    Psychiatric Illness Father     Cancer Sister         liver    Psychiatric Illness Brother         depression    Other Brother         suicide  "      SOCIAL HISTORY  Social History     Tobacco Use    Smoking status: Former     Current packs/day: 0.00     Average packs/day: 2.0 packs/day for 20.0 years (40.0 ttl pk-yrs)     Types: Cigarettes     Start date: 1952     Quit date: 1972     Years since quittin.7    Smokeless tobacco: Never   Vaping Use    Vaping status: Never Used   Substance and Sexual Activity    Alcohol use: Not Currently     Alcohol/week: 8.4 oz     Types: 14 Glasses of wine per week     Comment: 2 glasses of red wine/night     Drug use: No    Sexual activity: Yes     Partners: Male     Birth control/protection: Post-Menopausal       CURRENT MEDICATIONS  Home Medications       Reviewed by Christin Christianson R.N. (Registered Nurse) on 24 at 0757  Med List Status: Not Addressed     Medication Last Dose Status   acetaminophen (TYLENOL) 500 MG Tab  Active   albuterol 108 (90 Base) MCG/ACT Aero Soln inhalation aerosol  Active   furosemide (LASIX) 20 MG Tab  Active   guaiFENesin ER (MUCINEX) 600 MG TABLET SR 12 HR  Active   Magnesium Citrate 1.745 GM/30ML Solution  Active   potassium Chloride ER (K-TAB) 20 MEQ Tab CR tablet  Active   thyroid (ARMOUR THYROID) 15 MG Tab  Active   Vitamins A & D (VITAMIN A & D PO)  Active                  Audit from Redirected Encounters    **Home medications have not yet been reviewed for this encounter**         ALLERGIES  Allergies   Allergen Reactions    Milk Protein Nausea    Other Drug Nausea     An antibiotic, name unknown       PHYSICAL EXAM  VITAL SIGNS: BP (!) 188/73   Pulse 96   Temp 36.4 °C (97.6 °F) (Temporal)   Resp (!) 22   Ht 1.626 m (5' 4\")   Wt 51.7 kg (114 lb)   SpO2 96%   BMI 19.57 kg/m²    Respiratory: Diminished breath sounds throughout with poor air movement, no crackles.  Tachypnea with chest wall retractions noted  Cardiac: Regular rate, regular rhythm  GI: Abdomen is soft and nontender, no guarding  Skin: No cyanosis  Neurologic: Speech clear.  Diffuse weakness in " the extremities.  Sensation intact.  Hard of hearing  Psychiatric: Normal mood, cooperative  Eyes: No conjunctivitis, pupils equal      EKG/LABS  Results for orders placed or performed during the hospital encounter of 09/07/24   CBC WITH DIFFERENTIAL   Result Value Ref Range    WBC 7.8 4.8 - 10.8 K/uL    RBC 5.31 4.20 - 5.40 M/uL    Hemoglobin 16.4 (H) 12.0 - 16.0 g/dL    Hematocrit 50.2 (H) 37.0 - 47.0 %    MCV 94.5 81.4 - 97.8 fL    MCH 30.9 27.0 - 33.0 pg    MCHC 32.7 32.2 - 35.5 g/dL    RDW 50.9 (H) 35.9 - 50.0 fL    Platelet Count 250 164 - 446 K/uL    MPV 10.6 9.0 - 12.9 fL    Neutrophils-Polys 68.40 44.00 - 72.00 %    Lymphocytes 20.90 (L) 22.00 - 41.00 %    Monocytes 5.60 0.00 - 13.40 %    Eosinophils 4.20 0.00 - 6.90 %    Basophils 0.60 0.00 - 1.80 %    Immature Granulocytes 0.30 0.00 - 0.90 %    Nucleated RBC 0.00 0.00 - 0.20 /100 WBC    Neutrophils (Absolute) 5.34 1.82 - 7.42 K/uL    Lymphs (Absolute) 1.63 1.00 - 4.80 K/uL    Monos (Absolute) 0.44 0.00 - 0.85 K/uL    Eos (Absolute) 0.33 0.00 - 0.51 K/uL    Baso (Absolute) 0.05 0.00 - 0.12 K/uL    Immature Granulocytes (abs) 0.02 0.00 - 0.11 K/uL    NRBC (Absolute) 0.00 K/uL   COMP METABOLIC PANEL   Result Value Ref Range    Sodium 135 135 - 145 mmol/L    Potassium 4.6 3.6 - 5.5 mmol/L    Chloride 99 96 - 112 mmol/L    Co2 27 20 - 33 mmol/L    Anion Gap 9.0 7.0 - 16.0    Glucose 96 65 - 99 mg/dL    Bun 12 8 - 22 mg/dL    Creatinine 0.54 0.50 - 1.40 mg/dL    Calcium 9.0 8.4 - 10.2 mg/dL    Correct Calcium 9.0 8.5 - 10.5 mg/dL    AST(SGOT) 12 12 - 45 U/L    ALT(SGPT) 11 2 - 50 U/L    Alkaline Phosphatase 103 (H) 30 - 99 U/L    Total Bilirubin 0.6 0.1 - 1.5 mg/dL    Albumin 4.0 3.2 - 4.9 g/dL    Total Protein 6.7 6.0 - 8.2 g/dL    Globulin 2.7 1.9 - 3.5 g/dL    A-G Ratio 1.5 g/dL   TROPONIN   Result Value Ref Range    Troponin T 11 6 - 19 ng/L   proBrain Natriuretic Peptide, NT   Result Value Ref Range    NT-proBNP 519 (H) 0 - 125 pg/mL   D-DIMER   Result  Value Ref Range    D-Dimer 1.04 (H) 0.00 - 0.50 ug/mL (FEU)   CoV-2, Flu A/B, And RSV by PCR ([facility])    Specimen: Respirate   Result Value Ref Range    Influenza virus A RNA Negative Negative    Influenza virus B, PCR Negative Negative    RSV, PCR Negative Negative    SARS-CoV-2 by PCR DETECTED (AA)     SARS-CoV-2 Source Nasal Swab    ESTIMATED GFR   Result Value Ref Range    GFR (CKD-EPI) 90 >60 mL/min/1.73 m 2   EKG (NOW)   Result Value Ref Range    Report       [facility] Emergency Dept.    Test Date:  2024  Pt Name:    EULALIO BONE         Department: [facility]  MRN:        [id number]                      [facility]  Gender:     Female                       Technician: [id number]  :        [id number]                   Requested By:DAREN LOWE  Order #:    [id number]                    Reading MD: DAREN LOWE MD    Measurements  Intervals                                Axis  Rate:       90                           P:          63  MS:         205                          QRS:        84  QRSD:       100                          T:          74  QT:         345  QTc:        422    Interpretive Statements  Sinus rhythm  Probable left atrial enlargement  Anteroseptal infarct, age indeterminate  Compared to ECG 2024 14:54:36  No significant changes  Electronically Signed On 2024 11:06:14 PDT by DAREN LOWE MD         I have independently interpreted this EKG    RADIOLOGY/PROCEDURES   I have independently interpreted the diagnostic imaging associated with this visit and am waiting the final reading from the radiologist.   My preliminary interpretation is as follows: Chest x-ray is negative for pneumonia    Radiologist interpretation:  CT-CTA CHEST PULMONARY ARTERY W/ RECONS   Final Result         1. No pulmonary embolism or other acute process.   2. Severe emphysema.            DX-KNEE 3 VIEWS RIGHT   Final Result         1. No acute process.   2.  Moderate DJD.   3. Osteopenia.      DX-CHEST-PORTABLE (1 VIEW)   Final Result      No acute process. Emphysema.          COURSE & MEDICAL DECISION MAKING    ASSESSMENT, COURSE AND PLAN  Care Narrative: Patient diagnosed with COVID last month, had struggled but was doing better.  The shortness of breath and weakness have worsened over the last week concerning for complications of pneumonia, pneumothorax, pulmonary embolism.  With positive D-dimer, CT scan of the chest obtained, negative for PE.  There is no evidence of pneumonia, trauma, spontaneous pneumothorax.  Patient still test positive for COVID-19, and in the setting of her emphysema, appears to have exacerbation of her COPD with new hypoxia.  Breathing treatments ordered.  Plan for hospitalization for ongoing evaluation and treatment.  Patient has negative troponin, no ischemic changes on her EKG.  No new peripheral edema, this does not appear to be acute CHF.  No anemia, normal renal function.  Patient is hospitalized secondary to her hypoxia and worsening weakness for ongoing evaluation and treatment in the setting of ongoing COVID infection        DISPOSITION AND DISCUSSIONS  I have discussed management of the patient with the following physicians and ARLEEN's: Admitting hospitalist service    Discussion of management with other [facility] or appropriate source(s): Case Management was involved regarding of the patient possibility of disposition to rehab facility once medically clear, this was discussed with the patient and her son    Decision tools and prescription drugs considered including, but not limited to: Antibiotics were not indicated, no evidence of acute bacterial pneumonia .    FINAL DIAGNOSIS  1. COVID-19 virus infection    2. Hypoxia    3. Weakness    4. Acute exacerbation of chronic obstructive pulmonary disease (COPD) ([facility])         Electronically signed by: Kai Richardson M.D., 9/7/2024 8:47 AM

## 2024-09-07 NOTE — ASSESSMENT & PLAN NOTE
Patient has been having shortness of breath since August 5.  Her shortness of breath has considerably improved for a while then gotten much worse.  Currently she is complaining of severity of breathing and patient was brought in by the son for evaluation.  Patient is still positive for COVID-19 infection.  The patient's initial infection was positive on August 5.  She is again positive for it on September 7.  Patient will need at this point continue oxygen support to keep oxygen saturations above 90%.  Patient to continue oxygen/dexamethasone on hospice for comfort

## 2024-09-07 NOTE — ASSESSMENT & PLAN NOTE
Patient was diagnosed with polio at age 5.  She has difficulties with transfers and lower extremity weakness.  She utilizes a wheelchair most of the time  Recently she fell and sprained her right knee and since then she has been having trouble getting back to her baseline.    Patient does not want to go to rehab she wants to go home on hospice

## 2024-09-07 NOTE — ED TRIAGE NOTES
Pt JUANPABLO REMSA from home for weakness and SOB for the past month after having had COVID. She reports becoming so weak that she slides down to the ground. Denies falling and hitting head. This morning pt tried to get out of bed and slipped out of bed falling onto her right knee causing significant pain. Pt A&Ox4 and ambulatory. A&O4.

## 2024-09-07 NOTE — PROGRESS NOTES
4 Eyes Skin Assessment Completed by URIEL bergman and URIEL barnett.    Head WDL  Ears Redness and Blanching  Nose WDL  Mouth WDL  Neck WDL  Breast/Chest WDL  Shoulder Blades WDL  Spine WDL  (R) Arm/Elbow/Hand Bruising and Scab  (L) Arm/Elbow/Hand Bruising and Scab  Abdomen WDL  Groin WDL  Scrotum/Coccyx/Buttocks Redness  (R) Leg WDL  (L) Leg WDL  (R) Heel/Foot/Toe Bruising  (L) Heel/Foot/Toe WDL          Devices In Places Tele Box, Blood Pressure Cuff, and Pulse Ox      Interventions In Place Gray Ear Foams, Sacral Mepilex, and Pillows    Possible Skin Injury Yes    Pictures Uploaded Into Epic Yes  Wound Consult Placed Yes  RN Wound Prevention Protocol Ordered Yes

## 2024-09-07 NOTE — ED NOTES
Pharmacy Medication Reconciliation      ~Medication reconciliation updated and complete per patient   ~Allergies have been verified and updated   ~No oral ABX within the last 30 days  ~Patient home pharmacy :  [facility] [place] [phone]      ~Anticoagulants (rivaroxaban, apixaban, edoxaban, dabigatran, warfarin, enoxaparin) taken in the last 14 days? No

## 2024-09-07 NOTE — ASSESSMENT & PLAN NOTE
Initially positive on August 5  Repeat test today positive still  Patient at this point will need Decadron to help her with the shortness of breath and a COVID-19 infection.  She has never had her vaccination for it  Patient most likely is a COVID long-hauler although the possibility of her getting a reinfection is not ruled out.  Initiate zinc vitamin C and vitamin D.

## 2024-09-07 NOTE — DISCHARGE PLANNING
Met with pt and son. Son said that up to a week ago, pt was able to transfer on her own from wheelchair to bed, wheelchair to toilet etc however for 1 week now, pt unable to stand and transfer at all. Pt  requires total assistance with ADLs. Daughter has been helping. Pt apparently had homehealth services. However, per EPIC note , HH was dc on 08/30/24 per daughters request and son has confirmed this.     We discussed discharge planning and pt said she is open to any plan. CM explained that if she qualifies, she can go to SNF. CM gave son a choice list and he will look into some on these facilities. He will give his choices to the unit CM. They would also consider HH services.     Confirmed information on facesheet as correct.   PCP is :Guy Jacob MD  Pharmacy is :[facility] [address]  DME:[employer]  Social Support:children  Anticipate dc plan and resources needed:SNF vs HH services.   Transportation:possible need for medical transport to SNF or children will take pt home.      Care Transition Team Assessment    Information Source  Orientation Level: Oriented X4  Information Given By: Patient  Who is responsible for making decisions for patient? : Patient    Readmission Evaluation  Is this a readmission?: No    Elopement Risk  Legal Hold: No    Interdisciplinary Discharge Planning  Does Admitting Nurse Feel This Could be a Complex Discharge?: No  Primary Care Physician: Dr Guy Jacob  Lives with - Patient's Self Care Capacity: Adult Children  Support Systems: Children  Do You Take your Prescribed Medications Regularly: Yes  Able to Return to Previous ADL's: Future Time w/Therapy  Mobility Issues: Yes  Prior Services: Intermittent Physical Support for ADL Per Family  Patient Prefers to be Discharged to:: SNF  Assistance Needed: Yes  Durable Medical Equipment: Other - Specify (uses a wheelchair)    Discharge Preparedness  What is your plan after discharge?: Skilled nursing facility  What are your  discharge supports?: Child  Prior Functional Level: Ambulatory, Needs Assist with Activities of Daily Living, Needs Assist with Medication Management, Uses Wheelchair  Difficulity with ADLs: Bathing, Dressing, Toileting, Walking  Difficulity with IADLs: Cooking, Driving, Laundry, Shopping    Functional Assesment  Prior Functional Level: Ambulatory, Needs Assist with Activities of Daily Living, Needs Assist with Medication Management, Uses Wheelchair    Finances  Financial Barriers to Discharge: No  Prescription Coverage: Yes    Vision / Hearing Impairment  Vision Impairment : Yes  Hearing Impairment : Yes    Values / Beliefs / Concerns  Values / Beliefs Concerns : No    Advance Directive  Advance Directive?: Living Will    Domestic Abuse  Have you ever been the victim of abuse or violence?: No    Psychological Assessment  History of Substance Abuse: None    Discharge Risks or Barriers  Discharge risks or barriers?: Post-acute placement / services  Patient risk factors: Cognitive / sensory / physical deficit, Complex medical needs    Anticipated Discharge Information  Discharge Disposition: D/T to SNF with Medicare cert in anticipation of skilled care (03)

## 2024-09-07 NOTE — ASSESSMENT & PLAN NOTE
Patient recently has sprained her right knee this may be a combination of COVID infection, polio, with the weakness from the combination of things the patient has now injured her knee to the point where she has a hard time independently ambulating and doing things.  Prior to this she was pretty independent with a wheelchair as she has had polio since age 5.    Patient to see physical therapy today with recommendations for at home exercises to rehab her right knee.

## 2024-09-08 PROBLEM — Z71.89 ADVANCED DIRECTIVES, COUNSELING/DISCUSSION: Status: ACTIVE | Noted: 2024-09-08

## 2024-09-08 LAB
ANION GAP SERPL CALC-SCNC: 11 MMOL/L (ref 7–16)
BASOPHILS # BLD AUTO: 0 % (ref 0–1.8)
BASOPHILS # BLD: 0 K/UL (ref 0–0.12)
BUN SERPL-MCNC: 11 MG/DL (ref 8–22)
CALCIUM SERPL-MCNC: 8.4 MG/DL (ref 8.4–10.2)
CHLORIDE SERPL-SCNC: 99 MMOL/L (ref 96–112)
CHOLEST SERPL-MCNC: 151 MG/DL (ref 100–199)
CO2 SERPL-SCNC: 24 MMOL/L (ref 20–33)
CREAT SERPL-MCNC: 0.34 MG/DL (ref 0.5–1.4)
EKG IMPRESSION: NORMAL
EOSINOPHIL # BLD AUTO: 0 K/UL (ref 0–0.51)
EOSINOPHIL NFR BLD: 0 % (ref 0–6.9)
ERYTHROCYTE [DISTWIDTH] IN BLOOD BY AUTOMATED COUNT: 49.4 FL (ref 35.9–50)
GFR SERPLBLD CREATININE-BSD FMLA CKD-EPI: 101 ML/MIN/1.73 M 2
GLUCOSE SERPL-MCNC: 133 MG/DL (ref 65–99)
HCT VFR BLD AUTO: 44.2 % (ref 37–47)
HDLC SERPL-MCNC: 70 MG/DL
HGB BLD-MCNC: 14.4 G/DL (ref 12–16)
IMM GRANULOCYTES # BLD AUTO: 0.02 K/UL (ref 0–0.11)
IMM GRANULOCYTES NFR BLD AUTO: 0.5 % (ref 0–0.9)
LDLC SERPL CALC-MCNC: 70 MG/DL
LYMPHOCYTES # BLD AUTO: 0.65 K/UL (ref 1–4.8)
LYMPHOCYTES NFR BLD: 16.5 % (ref 22–41)
MCH RBC QN AUTO: 30.4 PG (ref 27–33)
MCHC RBC AUTO-ENTMCNC: 32.6 G/DL (ref 32.2–35.5)
MCV RBC AUTO: 93.4 FL (ref 81.4–97.8)
MONOCYTES # BLD AUTO: 0.17 K/UL (ref 0–0.85)
MONOCYTES NFR BLD AUTO: 4.3 % (ref 0–13.4)
NEUTROPHILS # BLD AUTO: 3.11 K/UL (ref 1.82–7.42)
NEUTROPHILS NFR BLD: 78.7 % (ref 44–72)
NRBC # BLD AUTO: 0 K/UL
NRBC BLD-RTO: 0 /100 WBC (ref 0–0.2)
NT-PROBNP SERPL IA-MCNC: 1140 PG/ML (ref 0–125)
PLATELET # BLD AUTO: 229 K/UL (ref 164–446)
PMV BLD AUTO: 10.3 FL (ref 9–12.9)
POTASSIUM SERPL-SCNC: 4.2 MMOL/L (ref 3.6–5.5)
RBC # BLD AUTO: 4.73 M/UL (ref 4.2–5.4)
SODIUM SERPL-SCNC: 134 MMOL/L (ref 135–145)
TRIGL SERPL-MCNC: 57 MG/DL (ref 0–149)
WBC # BLD AUTO: 4 K/UL (ref 4.8–10.8)

## 2024-09-08 PROCEDURE — 80061 LIPID PANEL: CPT

## 2024-09-08 PROCEDURE — 36415 COLL VENOUS BLD VENIPUNCTURE: CPT

## 2024-09-08 PROCEDURE — 51798 US URINE CAPACITY MEASURE: CPT

## 2024-09-08 PROCEDURE — 99232 SBSQ HOSP IP/OBS MODERATE 35: CPT | Mod: 25 | Performed by: INTERNAL MEDICINE

## 2024-09-08 PROCEDURE — 85025 COMPLETE CBC W/AUTO DIFF WBC: CPT

## 2024-09-08 PROCEDURE — 93005 ELECTROCARDIOGRAM TRACING: CPT | Performed by: HOSPITALIST

## 2024-09-08 PROCEDURE — 94760 N-INVAS EAR/PLS OXIMETRY 1: CPT

## 2024-09-08 PROCEDURE — 93010 ELECTROCARDIOGRAM REPORT: CPT | Performed by: INTERNAL MEDICINE

## 2024-09-08 PROCEDURE — 700111 HCHG RX REV CODE 636 W/ 250 OVERRIDE (IP): Performed by: HOSPITALIST

## 2024-09-08 PROCEDURE — 99497 ADVNCD CARE PLAN 30 MIN: CPT | Performed by: INTERNAL MEDICINE

## 2024-09-08 PROCEDURE — 700102 HCHG RX REV CODE 250 W/ 637 OVERRIDE(OP): Performed by: HOSPITALIST

## 2024-09-08 PROCEDURE — A9270 NON-COVERED ITEM OR SERVICE: HCPCS | Performed by: HOSPITALIST

## 2024-09-08 PROCEDURE — 770020 HCHG ROOM/CARE - TELE (206)

## 2024-09-08 PROCEDURE — 80048 BASIC METABOLIC PNL TOTAL CA: CPT

## 2024-09-08 PROCEDURE — 83880 ASSAY OF NATRIURETIC PEPTIDE: CPT

## 2024-09-08 RX ADMIN — ALBUTEROL SULFATE 2 PUFF: 90 AEROSOL, METERED RESPIRATORY (INHALATION) at 14:30

## 2024-09-08 RX ADMIN — ZINC SULFATE 220 MG (50 MG) CAPSULE 220 MG: CAPSULE at 05:32

## 2024-09-08 RX ADMIN — LISINOPRIL 10 MG: 5 TABLET ORAL at 05:31

## 2024-09-08 RX ADMIN — OXYCODONE HYDROCHLORIDE AND ACETAMINOPHEN 1000 MG: 500 TABLET ORAL at 05:31

## 2024-09-08 RX ADMIN — DEXAMETHASONE SODIUM PHOSPHATE 6 MG: 4 INJECTION INTRA-ARTICULAR; INTRALESIONAL; INTRAMUSCULAR; INTRAVENOUS; SOFT TISSUE at 05:30

## 2024-09-08 RX ADMIN — Medication 1000 UNITS: at 05:31

## 2024-09-08 RX ADMIN — LACTULOSE 15 ML: 20 SOLUTION ORAL at 05:32

## 2024-09-08 RX ADMIN — THYROID 45 MG: 30 TABLET ORAL at 05:31

## 2024-09-08 RX ADMIN — ENOXAPARIN SODIUM 40 MG: 100 INJECTION SUBCUTANEOUS at 17:24

## 2024-09-08 ASSESSMENT — ENCOUNTER SYMPTOMS
HEARTBURN: 0
SHORTNESS OF BREATH: 1
FEVER: 0
WEAKNESS: 1
CHILLS: 0
COUGH: 0
VOMITING: 0
INSOMNIA: 0
NERVOUS/ANXIOUS: 0
MYALGIAS: 0
DEPRESSION: 0
DIARRHEA: 0
SORE THROAT: 0
CONSTIPATION: 0
BLURRED VISION: 0
CLAUDICATION: 0
PHOTOPHOBIA: 0
SPEECH CHANGE: 0
SENSORY CHANGE: 0
DIZZINESS: 0
ABDOMINAL PAIN: 0
HEADACHES: 0

## 2024-09-08 ASSESSMENT — PAIN DESCRIPTION - PAIN TYPE: TYPE: ACUTE PAIN

## 2024-09-08 ASSESSMENT — FIBROSIS 4 INDEX: FIB4 SCORE: 1.34

## 2024-09-08 NOTE — PROGRESS NOTES
"Patient exhibited oliguria with 25 mL output during shift, a bladder scan was performed and showed 40 mL retained in bladder. Patient stated, \"I did not drink very much yesterday.\" Patient educated on need for oral hydration and the effects of dehydration, patient verbalized understanding and a desire to consume more PO fluids.   "

## 2024-09-08 NOTE — CARE PLAN
The patient is Stable - Low risk of patient condition declining or worsening    Shift Goals  Clinical Goals: Monitor labs and vitals; isolation precautions; safety  Patient Goals: rest and comfort    Progress made toward(s) clinical / shift goals: Patient is progressing towards goals and participates in care by permitting medical and non-medical interventions.       Problem: Knowledge Deficit - Standard  Goal: Patient and family/care givers will demonstrate understanding of plan of care, disease process/condition, diagnostic tests and medications  Outcome: Progressing  Note: Patient verbalizes understanding of plan of care and barriers to discharge. Patient asks appropriate questions about plan of care.       Problem: Fall Risk  Goal: Patient will remain free from falls  Outcome: Progressing  Note: Fall precautions are in place. Patient uses call light appropriately.         Patient is not progressing towards the following goals:

## 2024-09-08 NOTE — PROGRESS NOTES
Telemetry Shift Summary     Rhythm: SR  Rate: 61-83  Measurements: .18/.10/.42  Ectopy (reported by Monitor Tech): rPVC/rPAC/oPVC/fPVC/trigem/PSVT(up to 144)     Normal Values  Rhythm: Sinus  HR:   Measurements: 0.12-0.20/0.06-0.10/0.30-0.52

## 2024-09-08 NOTE — PROGRESS NOTES
Hospital Medicine Daily Progress Note    Date of Service  9/8/2024    Chief Complaint  Norah Mchugh is a 84 y.o. female admitted 9/7/2024 with weakness, shortness of breath, fall from bed resulting in right knee sprain.    Hospital Course  Patient is an 84-year-old female with history of polio and recent diagnosis of COVID on August 5.  She was treated and discharged home however continues to be very short of breath and weak and has not bounced back from her initial diagnosis.  She was started on oxygen and dexamethasone and is feeling slightly better.  She slipped out of bed and sprained her right knee with the recent weakness.  She is still testing positive for COVID and is still very much symptomatic.  Patient called me into her room and stated that she does not want to continue with aggressive treatment and wants to go home on hospice.  Family meeting was held later in the afternoon after she discussed with her children what her wishes are.  Questions were answered and patient and family are agreeable with transition to hospice as long as they can accommodate caregivers.  Caregiver association name given to patient's family and they will look into this and I have notified the  that patient would like hospice if he could coordinate some hospice liaison's to come and meet with her.  Family is agreeable with hospice as long as caregivers are set up and understand that caregiving will be out-of-pocket.  At this time we will continue with current interventions but if patient should become less stable then comfort care options were also discussed.    Interval Problem Update  9/8 patient very pleasant and direct with her requests regarding end-of-life and hospice.  We had a private conversation earlier in the day and then she and family called me back to bedside to have another family meeting regarding her wishes.  Questions were answered and patient is decided that she wants to go home with  hospice and caregivers.  She does not wish to transition to comfort care here in the hospital yet but if things change she will let us know.  I have placed hospice referral and liaisons should be coming to meet with patient and family today and tomorrow.    I have discussed this patient's plan of care and discharge plan at IDT rounds today with Case Management, Nursing, Nursing leadership, and other members of the IDT team.    Consultants/Specialty  none    Code Status  DNAR/DNI    Disposition  The patient is medically cleared for discharge to home or a post-acute facility.  Anticipate discharge to: hospice    I have placed the appropriate orders for post-discharge needs.    Review of Systems  Review of Systems   Constitutional:  Positive for malaise/fatigue. Negative for chills and fever.   HENT:  Negative for congestion and sore throat.    Eyes:  Negative for blurred vision and photophobia.   Respiratory:  Positive for shortness of breath. Negative for cough.    Cardiovascular:  Negative for chest pain, claudication and leg swelling.   Gastrointestinal:  Negative for abdominal pain, constipation, diarrhea, heartburn and vomiting.   Genitourinary:  Negative for dysuria and hematuria.   Musculoskeletal:  Negative for joint pain and myalgias.   Skin:  Negative for itching and rash.   Neurological:  Positive for weakness. Negative for dizziness, sensory change, speech change and headaches.   Psychiatric/Behavioral:  Negative for depression. The patient is not nervous/anxious and does not have insomnia.         Physical Exam  Temp:  [36.4 °C (97.5 °F)-36.8 °C (98.3 °F)] 36.8 °C (98.3 °F)  Pulse:  [69-89] 85  Resp:  [16-19] 16  BP: (130-157)/() 130/76  SpO2:  [93 %-97 %] 93 %    Physical Exam  Vitals and nursing note reviewed.   Constitutional:       General: She is not in acute distress.     Appearance: Normal appearance. She is not ill-appearing.   HENT:      Head: Normocephalic and atraumatic.      Nose: Nose  normal.   Eyes:      General: No scleral icterus.  Cardiovascular:      Rate and Rhythm: Normal rate and regular rhythm.      Heart sounds: Normal heart sounds. No murmur heard.  Pulmonary:      Effort: Pulmonary effort is normal.      Breath sounds: Normal breath sounds.   Abdominal:      General: Bowel sounds are normal. There is no distension.      Palpations: Abdomen is soft.   Musculoskeletal:         General: No swelling or tenderness.      Cervical back: Neck supple.   Skin:     General: Skin is warm and dry.   Neurological:      General: No focal deficit present.      Mental Status: She is alert and oriented to person, place, and time.      Motor: Weakness present.   Psychiatric:         Mood and Affect: Mood normal.         Fluids    Intake/Output Summary (Last 24 hours) at 9/8/2024 1405  Last data filed at 9/8/2024 1300  Gross per 24 hour   Intake --   Output 275 ml   Net -275 ml        Laboratory  Recent Labs     09/07/24  0804 09/08/24  0116   WBC 7.8 4.0*   RBC 5.31 4.73   HEMOGLOBIN 16.4* 14.4   HEMATOCRIT 50.2* 44.2   MCV 94.5 93.4   MCH 30.9 30.4   MCHC 32.7 32.6   RDW 50.9* 49.4   PLATELETCT 250 229   MPV 10.6 10.3     Recent Labs     09/07/24  0804 09/08/24  0116   SODIUM 135 134*   POTASSIUM 4.6 4.2   CHLORIDE 99 99   CO2 27 24   GLUCOSE 96 133*   BUN 12 11   CREATININE 0.54 0.34*   CALCIUM 9.0 8.4             Recent Labs     09/08/24  0116   TRIGLYCERIDE 57   HDL 70   LDL 70       Imaging  CT-CTA CHEST PULMONARY ARTERY W/ RECONS   Final Result         1. No pulmonary embolism or other acute process.   2. Severe emphysema.            DX-KNEE 3 VIEWS RIGHT   Final Result         1. No acute process.   2. Moderate DJD.   3. Osteopenia.      DX-CHEST-PORTABLE (1 VIEW)   Final Result      No acute process. Emphysema.           Assessment/Plan  * Acute respiratory failure with hypoxemia (HCC)- (present on admission)  Assessment & Plan  Patient has been having shortness of breath since August 5.  Her  shortness of breath has considerably improved for a while then gotten much worse.  Currently she is complaining of severity of breathing and patient was brought in by the son for evaluation.  Patient is still positive for COVID-19 infection.  The patient's initial infection was positive on August 5.  She is again positive for it on September 7.  Patient will need at this point continue oxygen support to keep oxygen saturations above 90%.  Patient to continue oxygen/dexamethasone on hospice for comfort    Advanced directives, counseling/discussion  Assessment & Plan  Patient stated she wants to go on hospice care as she feels like it is her time to go and she does not want to worry about taking medications, eating when she is not hungry, drinking when she is not thirsty.  She discussed this with her family and then I later came to bedside to have a family meeting to answer all questions.  Initially we discussed palliative care if she had further questions about end-of-life but she felt that questions were answered as well as family.  Hospice referral placed and  notified of patient's wishes.  Patient's family agreeable that patient will need caregivers at home as they have been trying to take care of her but feel that patient will injure herself especially with transfers if they continue to do so.  Time at bedside approximately 35 minutes for conversation with patient and family.    Chronic idiopathic constipation- (present on admission)  Assessment & Plan  Patient has chronic idiopathic constipation may be related to her polio  Patient at home has tried magnesium citrate and other over-the-counter remedies but she says it has not worked.  I am going to initiate her on lactulose 15 mL twice daily    Right knee sprain- (present on admission)  Assessment & Plan  Patient recently has sprained her right knee this may be a combination of COVID infection, polio, with the weakness from the combination of things  the patient has now injured her knee to the point where she has a hard time independently ambulating and doing things.  Prior to this she was pretty independent with a wheelchair as she has had polio since age 5.      DNR (do not resuscitate)- (present on admission)  Assessment & Plan  Discussed advanced directives with the patient and her son at bedside and she wishes to be DO NOT RESUSCITATE CODE STATUS.    Elevated alkaline phosphatase level- (present on admission)  Assessment & Plan  Obtain a GGT level.    Polycythemia- (present on admission)  Assessment & Plan  Chronic polycythemia currently does not need phlebotomy most recent H&H is 16.4 and 50.2    COVID-19 long hauler- (present on admission)  Assessment & Plan  Initially positive on August 5  Repeat test today positive still  Patient at this point will need Decadron to help her with the shortness of breath and a COVID-19 infection.  She has never had her vaccination for it  Patient most likely is a COVID long-hauler although the possibility of her getting a reinfection is not ruled out.  Initiate zinc vitamin C and vitamin D.    Acquired hypothyroidism- (present on admission)  Assessment & Plan  Continue [place] Thyroid 45 mg daily  Most recent TSH 0.868 and T3 was 3.86    Poliomyelitis (historic)- (present on admission)  Assessment & Plan  Patient was diagnosed with polio at age 5.  She has difficulties with transfers and lower extremity weakness.  She utilizes a wheelchair most of the time  Recently she fell and sprained her right knee and since then she has been having trouble getting back to her baseline.    Patient does not want to go to rehab she wants to go home on hospice         VTE prophylaxis:    enoxaparin ppx      I have performed a physical exam and reviewed and updated ROS and Plan today (9/8/2024). In review of yesterday's note (9/7/2024), there are no changes except as documented above.

## 2024-09-08 NOTE — HOSPITAL COURSE
Patient is an 84-year-old female with history of polio and recent diagnosis of COVID on August 5.  She was treated and discharged home however continues to be very short of breath and weak and has not bounced back from her initial diagnosis.  She was started on oxygen and dexamethasone and is feeling slightly better.  She slipped out of bed and sprained her right knee with the recent weakness.  She is still testing positive for COVID and is still very much symptomatic.  Patient called me into her room and stated that she does not want to continue with aggressive treatment and wants to go home on hospice.  Family meeting was held later in the afternoon after she discussed with her children what her wishes are.  Questions were answered and patient and family are agreeable with transition to hospice as long as they can accommodate caregivers.  Caregiver association name given to patient's family and they will look into this and I have notified the  that patient would like hospice if he could coordinate some hospice liaison's to come and meet with her.  Family is agreeable with hospice as long as caregivers are set up and understand that caregiving will be out-of-pocket.  At this time we will continue with current interventions but if patient should become less stable then comfort care options were also discussed.

## 2024-09-08 NOTE — ASSESSMENT & PLAN NOTE
9/8:  Patient stated she wants to go on hospice care as she feels like it is her time to go and she does not want to worry about taking medications, eating when she is not hungry, drinking when she is not thirsty.  She discussed this with her family and then I later came to bedside to have a family meeting to answer all questions.  Initially we discussed palliative care if she had further questions about end-of-life but she felt that questions were answered as well as family.  Hospice referral placed and  notified of patient's wishes.  Patient's family agreeable that patient will need caregivers at home as they have been trying to take care of her but feel that patient will injure herself especially with transfers if they continue to do so.  Time at bedside approximately 35 minutes for conversation with patient and family.

## 2024-09-08 NOTE — CARE PLAN
The patient is Stable - Low risk of patient condition declining or worsening    Shift Goals  Clinical Goals: patient will remain free from falls during shift  Patient Goals: rest    Progress made toward(s) clinical / shift goals:  Patient calls for assistance, bed in low and locked position, call light within reach,     Patient is not progressing towards the following goals: NA

## 2024-09-08 NOTE — THERAPY
Physical Therapy Contact Note    Patient Name: Norah Mchugh  Age:  84 y.o., Sex:  female  Medical Record #: [id number]  Today's Date: 9/8/2024    Discussion with patient about role of acute care PT and her current POC. Pt stated that she had a conversation with Dr. Machuca and is looking to pursue hospice at this time. She would not like to participate in acute care PT services at this time. Pt was educated that if she changes her mind and would like acute care PT that it can be reordered by her physician at any time. Pt stated understanding. Will complete orders at this time, please reorder as needed.     Lara Holder, PT, DPT  [phone]

## 2024-09-08 NOTE — PROGRESS NOTES
0700: Received report from NOC RN. Assumed patient care. Fall precaution in place. Patient currently on 2 L O2, > 90%, will titrate as tolerated. Updated with plan of care.

## 2024-09-09 LAB
ANION GAP SERPL CALC-SCNC: 11 MMOL/L (ref 7–16)
BUN SERPL-MCNC: 19 MG/DL (ref 8–22)
CALCIUM SERPL-MCNC: 8.4 MG/DL (ref 8.4–10.2)
CHLORIDE SERPL-SCNC: 98 MMOL/L (ref 96–112)
CO2 SERPL-SCNC: 25 MMOL/L (ref 20–33)
CREAT SERPL-MCNC: 0.48 MG/DL (ref 0.5–1.4)
ERYTHROCYTE [DISTWIDTH] IN BLOOD BY AUTOMATED COUNT: 49 FL (ref 35.9–50)
GFR SERPLBLD CREATININE-BSD FMLA CKD-EPI: 93 ML/MIN/1.73 M 2
GLUCOSE SERPL-MCNC: 107 MG/DL (ref 65–99)
HCT VFR BLD AUTO: 44 % (ref 37–47)
HGB BLD-MCNC: 14.4 G/DL (ref 12–16)
MCH RBC QN AUTO: 30.6 PG (ref 27–33)
MCHC RBC AUTO-ENTMCNC: 32.7 G/DL (ref 32.2–35.5)
MCV RBC AUTO: 93.6 FL (ref 81.4–97.8)
PLATELET # BLD AUTO: 235 K/UL (ref 164–446)
PMV BLD AUTO: 10.7 FL (ref 9–12.9)
POTASSIUM SERPL-SCNC: 4.3 MMOL/L (ref 3.6–5.5)
RBC # BLD AUTO: 4.7 M/UL (ref 4.2–5.4)
SODIUM SERPL-SCNC: 134 MMOL/L (ref 135–145)
WBC # BLD AUTO: 7.6 K/UL (ref 4.8–10.8)

## 2024-09-09 PROCEDURE — A9270 NON-COVERED ITEM OR SERVICE: HCPCS | Performed by: HOSPITALIST

## 2024-09-09 PROCEDURE — 36415 COLL VENOUS BLD VENIPUNCTURE: CPT

## 2024-09-09 PROCEDURE — 97535 SELF CARE MNGMENT TRAINING: CPT

## 2024-09-09 PROCEDURE — 94760 N-INVAS EAR/PLS OXIMETRY 1: CPT

## 2024-09-09 PROCEDURE — 80048 BASIC METABOLIC PNL TOTAL CA: CPT

## 2024-09-09 PROCEDURE — 770001 HCHG ROOM/CARE - MED/SURG/GYN PRIV*

## 2024-09-09 PROCEDURE — 97165 OT EVAL LOW COMPLEX 30 MIN: CPT

## 2024-09-09 PROCEDURE — 99232 SBSQ HOSP IP/OBS MODERATE 35: CPT | Performed by: INTERNAL MEDICINE

## 2024-09-09 PROCEDURE — 85027 COMPLETE CBC AUTOMATED: CPT

## 2024-09-09 PROCEDURE — 700111 HCHG RX REV CODE 636 W/ 250 OVERRIDE (IP): Performed by: HOSPITALIST

## 2024-09-09 PROCEDURE — 700102 HCHG RX REV CODE 250 W/ 637 OVERRIDE(OP): Performed by: HOSPITALIST

## 2024-09-09 PROCEDURE — 8E0ZXY6 ISOLATION: ICD-10-PCS | Performed by: INTERNAL MEDICINE

## 2024-09-09 PROCEDURE — 97163 PT EVAL HIGH COMPLEX 45 MIN: CPT

## 2024-09-09 RX ADMIN — ZINC SULFATE 220 MG (50 MG) CAPSULE 220 MG: CAPSULE at 09:36

## 2024-09-09 RX ADMIN — THYROID 45 MG: 30 TABLET ORAL at 09:36

## 2024-09-09 RX ADMIN — Medication 1000 UNITS: at 09:36

## 2024-09-09 RX ADMIN — OXYCODONE HYDROCHLORIDE AND ACETAMINOPHEN 1000 MG: 500 TABLET ORAL at 09:34

## 2024-09-09 RX ADMIN — LISINOPRIL 10 MG: 5 TABLET ORAL at 09:35

## 2024-09-09 RX ADMIN — DEXAMETHASONE SODIUM PHOSPHATE 6 MG: 4 INJECTION INTRA-ARTICULAR; INTRALESIONAL; INTRAMUSCULAR; INTRAVENOUS; SOFT TISSUE at 09:37

## 2024-09-09 ASSESSMENT — COGNITIVE AND FUNCTIONAL STATUS - GENERAL
SUGGESTED CMS G CODE MODIFIER DAILY ACTIVITY: CK
PERSONAL GROOMING: A LITTLE
TOILETING: A LOT
HELP NEEDED FOR BATHING: A LITTLE
EATING MEALS: A LITTLE
WALKING IN HOSPITAL ROOM: TOTAL
STANDING UP FROM CHAIR USING ARMS: A LOT
TURNING FROM BACK TO SIDE WHILE IN FLAT BAD: A LITTLE
MOVING TO AND FROM BED TO CHAIR: A LOT
DRESSING REGULAR UPPER BODY CLOTHING: A LITTLE
DRESSING REGULAR LOWER BODY CLOTHING: A LOT
SUGGESTED CMS G CODE MODIFIER MOBILITY: CL
DAILY ACTIVITIY SCORE: 16
CLIMB 3 TO 5 STEPS WITH RAILING: TOTAL
MOVING FROM LYING ON BACK TO SITTING ON SIDE OF FLAT BED: A LITTLE
MOBILITY SCORE: 12

## 2024-09-09 ASSESSMENT — ENCOUNTER SYMPTOMS
INSOMNIA: 0
HEADACHES: 0
VOMITING: 0
MYALGIAS: 0
DIZZINESS: 0
HEARTBURN: 0
COUGH: 0
NERVOUS/ANXIOUS: 0
FEVER: 0
CLAUDICATION: 0
CONSTIPATION: 0
SHORTNESS OF BREATH: 1
SENSORY CHANGE: 0
CHILLS: 0
SORE THROAT: 0
BLURRED VISION: 0
DEPRESSION: 0
ABDOMINAL PAIN: 0
PHOTOPHOBIA: 0
DIARRHEA: 0
SPEECH CHANGE: 0
WEAKNESS: 1

## 2024-09-09 ASSESSMENT — PAIN DESCRIPTION - PAIN TYPE
TYPE: ACUTE PAIN
TYPE: ACUTE PAIN

## 2024-09-09 ASSESSMENT — GAIT ASSESSMENTS: GAIT LEVEL OF ASSIST: UNABLE TO PARTICIPATE

## 2024-09-09 ASSESSMENT — FIBROSIS 4 INDEX: FIB4 SCORE: 1.31

## 2024-09-09 ASSESSMENT — ACTIVITIES OF DAILY LIVING (ADL): TOILETING: INDEPENDENT

## 2024-09-09 NOTE — THERAPY
Physical Therapy   Initial Evaluation     Patient Name: Norah Mchugh  Age:  84 y.o., Sex:  female  Medical Record #: [id number]  Today's Date: 9/9/2024     Precautions  Precautions: Fall Risk  Comments: history of polio    Assessment  Patient is 84 y.o. female with a diagnosis of acute respiratory failure, admitted 9/7/2024 with weakness, shortness of breath, fall from bed resulting in right knee sprain. Pt also with diagnosis of COVID 8/5/2024 and has been SOB and weak since this time. Pt is c/o feeling weak as well as R posterior knee pain when standing which is limiting her ability to perform transfers.  Pt participated well with therapy, anxiety re: fear of falling when performing transfers. Recommend further PT at SNF vs acute rehab.  Pt also states is considering hospice. If pt does go home on hospice may required caregivers due to high level of assist of transfers (maxA x2).    Plan    Physical Therapy Initial Treatment Plan   Treatment Plan : Bed Mobility, Family / Caregiver Training, Neuro Re-Education / Balance, Self Care / Home Evaluation, Therapeutic Activities, Therapeutic Exercise  Treatment Frequency: 4 Times per Week  Duration: Until Therapy Goals Met    DC Equipment Recommendations: Unable to determine at this time  Discharge Recommendations: Recommend post-acute placement for additional physical therapy services prior to discharge home        09/09/24 1035   Prior Living Situation   Housing / Facility 1 Story House   Equipment Owned Power Wheel Chair;Single Point Cane   Lives with - Patient's Self Care Capacity Adult Children   Prior Level of Functional Mobility   Bed Mobility Independent   Transfer Status Independent   Ambulation Dependent   Wheelchair Independent   Comments Pt states transferred herself to/from w/c and onto toilet Indep with B canes PTA, since daignosis of covid has been very weak   History of Falls   History of Falls Yes   Cognition    Cognition / Consciousness WDL    Comments Pt is anxious re: falls   Passive ROM Lower Body   Passive ROM Lower Body WDL   Active ROM Lower Body    Active ROM Lower Body  X   Comments limited due to weakness, L LE> R LE   Strength Lower Body   Lower Body Strength  X   Comments L LE weaker than R LE; R LE strength limited by R knee pain when standing. B foot drop present which is baseline   Neurological Concerns   Neurological Concerns Yes   Comments B foot drop, polio causing increased weakness B LE however L > R   Balance Assessment   Sitting Balance (Static) Good   Sitting Balance (Dynamic) Fair +   Standing Balance (Static) Trace +   Standing Balance (Dynamic) Trace   Weight Shift Sitting Good   Weight Shift Standing Poor   Comments 2 person assist for standing balance   Bed Mobility    Supine to Sit Standby Assist   Gait Analysis   Gait Level Of Assist Unable to Participate   Functional Mobility   Sit to Stand Moderate Assist   Bed, Chair, Wheelchair Transfer Maximal Assist  (x2)   Transfer Method Stand Pivot   Activity Tolerance   Sitting Edge of Bed 10 min   Standing 1 min while RN performing brandon-care   Comments mod SOB with standing at EOB and transfers, on 2 L nc   Short Term Goals    Short Term Goal # 1 Pt will be able to perform bed mobility and sup < >sit Indep in 6 visits.   Short Term Goal # 2 Pt will be able to perform sit <> stand and transfer SBA in 6 visits.

## 2024-09-09 NOTE — DISCHARGE PLANNING
DC Transport Scheduled    Transport Company Scheduled:  [facility]  Spoke with Marivel at [facility] to schedule transport.    Scheduled Date: 9/10/2024  Scheduled Time: 1000    Destination: Home   Destination address: [address] [place], [place] [address] [place] [address]    Notified care team of scheduled transport via [employer].     If there are any changes needed to the DC transportation scheduled, please contact [facility] at ext. [phone] between the hours of 9700-0385 Mon-Fri. If outside those hours, contact the ED Case Manager at ext. [phone].

## 2024-09-09 NOTE — DISCHARGE PLANNING
Case Management Discharge Planning    Admission Date: 9/7/2024  GMLOS: 5  ALOS: 2    6-Clicks ADL Score: 17  6-Clicks Mobility Score: 12  PT and/or OT Eval ordered: Yes  Post-acute Referrals Ordered: Yes  Post-acute Choice Obtained: Yes  Has referral(s) been sent to post-acute provider:  Yes      Anticipated Discharge Dispo: Discharge Disposition: D/T to hospice home (50)    DME Needed: No    Action(s) Taken:     Per [facility] and referral hospice team, plan for DC home with hospice services tomorrow 09/10/2024 at 1000.    Transport confirmation received.    Escalations Completed: None    Medically Clear: Yes    Next Steps: Plan for DC home tomorrow at 100 via GMT with [facility] services.    Barriers to Discharge: None

## 2024-09-09 NOTE — THERAPY
Occupational Therapy   Initial Evaluation     Patient Name: Norah Mchugh  Age:  84 y.o., Sex:  female  Medical Record #: [id number]  Today's Date: 9/9/2024     Precautions  Precautions: Fall Risk  Comments: history of polio    Assessment  Patient is 84 y.o. female who was readmitted w/ SOB, COVID+, weakness and recent slip OOB resulting in R knee sprain.  Pt admitted with a diagnosis of acute respiratory failure w/ hypoxemia.  PMH - Polio, cataracts, emphysema, glaucoma, HTN.  Pt resides in a [facility] in [place], [place] w/ her daughter and son-in-law.  Family are  available to assist as needed.  PLOF Mod I to Indep for ADL's, transfers and mobility via power wc.  Barriers include SOB, limited activity tolerance, impaired balance and generalized weakness.        Plan    Occupational Therapy Initial Treatment Plan   Treatment Interventions: (P) Self Care / Activities of Daily Living, Adaptive Equipment, Neuro Re-Education / Balance, Therapeutic Exercises, Therapeutic Activity, Family / Caregiver Training  Treatment Frequency: (P) 3 Times per Week  Duration: (P) Until Therapy Goals Met    DC Equipment Recommendations: (P) Unable to determine at this time  Discharge Recommendations: (P) Recommend post-acute placement for additional occupational therapy services prior to discharge home     Subjective    Pt was alert and cooperative w/ tx.     Objective       09/09/24 0915    Services   Is patient using  services for this encounter? No   Initial Contact Note    Initial Contact Note Order Received and Verified, Occupational Therapy Evaluation in Progress with Full Report to Follow.   Prior Living Situation   Prior Services Intermittent Physical Support for ADL Per Family   Housing / Facility 1 Story House   Steps Into Home 0  (Ramps in place)   Steps In Home 0   Bathroom Set up Walk In Shower;Grab Bars;Shower Chair   Equipment Owned Power Wheel Chair;Single Point Cane;Grab Bar(s) In Tub / Shower;Tub /  Shower Seat;Bed Side Commode;Ramp;Front-Wheel Walker   Lives with - Patient's Self Care Capacity Adult Children   Comments Pt resides in a [facility] in [place], [place] w/ her daughter and son-in-law.  Family are  available to assist as needed.  PLOF Mod I to Indep for ADL's, transfers and mobility via power wc.   Prior Level of ADL Function   Self Feeding Independent   Grooming / Hygiene Independent   Bathing Independent   Dressing Independent   Toileting Independent   Prior Level of IADL Function   Medication Management Independent   Home Management Dependent   Prior Level Of Mobility Uses Wheel Chair for in Home Mobility;Uses Wheel Chair for Community Mobility   Driving / Transportation Relatives / Others Provide Transportation   Occupation (Pre-Hospital Vocational) Retired Due To Age   History of Falls   History of Falls Yes   Date of Last Fall   (Pt reported recent sliding out of bed and spraining R knee)   Precautions   Precautions Fall Risk   Comments Hx of polio   Vitals   Pulse Oximetry 95 %   O2 (LPM) 2   O2 Delivery Device Nasal Cannula   Pain   Intervention Declines   Pain 0 - 10 Group   Location Knee   Description Aching   Comfort Goal Comfort with Movement;Perform Activity   Cognition    Cognition / Consciousness WDL   Comments Pt fearful of falling   Coordination Upper Body   Coordination WDL   Balance Assessment   Sitting Balance (Static) Good   Sitting Balance (Dynamic) Fair +   Standing Balance (Static) Trace +   Standing Balance (Dynamic) Trace   Weight Shift Sitting Good   Weight Shift Standing Poor   Bed Mobility    Supine to Sit Standby Assist   ADL Assessment   Upper Body Dressing Minimal Assist   Lower Body Dressing Maximal Assist   Toileting Maximal Assist   How much help from another person does the patient currently need...   Putting on and taking off regular lower body clothing? 2   Bathing (including washing, rinsing, and drying)? 3   Toileting, which includes using a toilet, bedpan, or urinal? 2    Putting on and taking off regular upper body clothing? 3   Taking care of personal grooming such as brushing teeth? 3   Eating meals? 3   6 Clicks Daily Activity Score 16   Functional Mobility   Sit to Stand Moderate Assist   Bed, Chair, Wheelchair Transfer Maximal Assist   Transfer Method Stand Pivot   Visual Perception   Comments Noted cataracts and glaucoma   Short Term Goals   Short Term Goal # 1 Min assist toileting task via AE/DME   Short Term Goal # 2 Mod I UB clothing management   Short Term Goal # 3 CGA LB clothing management via AE/DME PRN   Short Term Goal # 4 Min assist toilet transfer via DME   Education Group   Education Provided Transfers;Role of Occupational Therapist;Activities of Daily Living;Use of Call Light   Role of Occupational Therapist Patient Response Patient;Acceptance;Explanation;Verbal Demonstration   Transfers Patient Response Patient;Acceptance;Explanation;Demonstration;Teach Back;Verbal Demonstration;Action Demonstration;Reinforcement Needed   ADL Patient Response Patient;Acceptance;Explanation;Demonstration;Teach Back;Verbal Demonstration;Action Demonstration;Reinforcement Needed   Use of Call Light Patient Response Patient;Acceptance;Explanation;Demonstration;Verbal Demonstration   Occupational Therapy Initial Treatment Plan    Treatment Interventions Self Care / Activities of Daily Living;Adaptive Equipment;Neuro Re-Education / Balance;Therapeutic Exercises;Therapeutic Activity;Family / Caregiver Training   Treatment Frequency 3 Times per Week   Duration Until Therapy Goals Met   Problem List   Problem List Decreased Active Daily Living Skills;Decreased Functional Mobility;Decreased Activity Tolerance;Safety Awareness Deficits / Cognition;Impaired Postural Control / Balance   Anticipated Discharge Equipment and Recommendations   DC Equipment Recommendations Unable to determine at this time   Discharge Recommendations Recommend post-acute placement for additional occupational  therapy services prior to discharge home

## 2024-09-09 NOTE — DISCHARGE PLANNING
PM&R referral from Dr. Lima. Chart review indicates choice for hospice. No physiatry consult ordered per protocol.

## 2024-09-09 NOTE — PROGRESS NOTES
Telemetry Shift Summary     Rhythm: SR/ST/Afib  Rate: 64-87  Measurements: .22/.10/.40  Ectopy (reported by Monitor Tech): rPAC/rPVC     Normal Values  Rhythm: Sinus  HR:   Measurements: 0.12-0.20/0.06-0.10/0.30-0.52

## 2024-09-09 NOTE — PROGRESS NOTES
Hospital Medicine Daily Progress Note    Date of Service  9/9/2024    Chief Complaint  Norah Mchugh is a 84 y.o. female admitted 9/7/2024 with weakness, shortness of breath, fall from bed resulting in right knee sprain.    Hospital Course  Patient is an 84-year-old female with history of polio and recent diagnosis of COVID on August 5.  She was treated and discharged home however continues to be very short of breath and weak and has not bounced back from her initial diagnosis.  She was started on oxygen and dexamethasone and is feeling slightly better.  She slipped out of bed and sprained her right knee with the recent weakness.  She is still testing positive for COVID and is still very much symptomatic.  Patient called me into her room and stated that she does not want to continue with aggressive treatment and wants to go home on hospice.  Family meeting was held later in the afternoon after she discussed with her children what her wishes are.  Questions were answered and patient and family are agreeable with transition to hospice as long as they can accommodate caregivers.  Caregiver association name given to patient's family and they will look into this and I have notified the  that patient would like hospice if he could coordinate some hospice liaison's to come and meet with her.  Family is agreeable with hospice as long as caregivers are set up and understand that caregiving will be out-of-pocket.  At this time we will continue with current interventions but if patient should become less stable then comfort care options were also discussed.    Interval Problem Update  9/8 patient very pleasant and direct with her requests regarding end-of-life and hospice.  We had a private conversation earlier in the day and then she and family called me back to bedside to have another family meeting regarding her wishes.  Questions were answered and patient is decided that she wants to go home with  hospice and caregivers.  She does not wish to transition to comfort care here in the hospital yet but if things change she will let us know.  I have placed hospice referral and liaisons should be coming to meet with patient and family today and tomorrow.    9/9 patient feeling about the same today.  She did have an episode where she was having abnormal symptoms after her Lovenox injection.  She does not wish to take this anymore and given the goal is comfort I have discontinued Lovenox.  Have also discontinued telemetry monitoring and further lab draws.  Patient and family to meet with hospice liaison at 1230 today to make sure this is what they wish to proceed with.  Patient did ask me today if she could also have rehab while she is on hospice I explained to her that home health and home hospice would not be concurrent.  I recommended that she meet with physical therapy today and see the exercises recommended and she could continue those with the home caregivers while on hospice.  If she wishes to be more aggressive with rehabilitation then hospice would not be appropriate.  She will make her decision after she meets with liaison.    I have discussed this patient's plan of care and discharge plan at IDT rounds today with Case Management, Nursing, Nursing leadership, and other members of the IDT team.    Consultants/Specialty  none    Code Status  DNAR/DNI    Disposition  The patient is medically cleared for discharge to home or a post-acute facility.  Anticipate discharge to: hospice    I have placed the appropriate orders for post-discharge needs.    Review of Systems  Review of Systems   Constitutional:  Positive for malaise/fatigue. Negative for chills and fever.   HENT:  Negative for congestion and sore throat.    Eyes:  Negative for blurred vision and photophobia.   Respiratory:  Positive for shortness of breath. Negative for cough.    Cardiovascular:  Negative for chest pain, claudication and leg swelling.    Gastrointestinal:  Negative for abdominal pain, constipation, diarrhea, heartburn and vomiting.   Genitourinary:  Negative for dysuria and hematuria.   Musculoskeletal:  Positive for joint pain (Right knee). Negative for myalgias.   Skin:  Negative for itching and rash.   Neurological:  Positive for weakness. Negative for dizziness, sensory change, speech change and headaches.   Psychiatric/Behavioral:  Negative for depression. The patient is not nervous/anxious and does not have insomnia.         Physical Exam  Temp:  [36.5 °C (97.7 °F)-37.1 °C (98.8 °F)] 37.1 °C (98.7 °F)  Pulse:  [70-93] 82  Resp:  [16-18] 18  BP: (107-162)/(48-75) 129/64  SpO2:  [94 %-100 %] 95 %    Physical Exam  Vitals and nursing note reviewed.   Constitutional:       General: She is not in acute distress.     Appearance: Normal appearance. She is not ill-appearing.   HENT:      Head: Normocephalic and atraumatic.      Nose: Nose normal.   Eyes:      General: No scleral icterus.  Cardiovascular:      Rate and Rhythm: Normal rate and regular rhythm.      Heart sounds: Normal heart sounds. No murmur heard.  Pulmonary:      Effort: Pulmonary effort is normal.      Breath sounds: Normal breath sounds.   Abdominal:      General: Bowel sounds are normal. There is no distension.      Palpations: Abdomen is soft.   Musculoskeletal:         General: No swelling or tenderness.      Cervical back: Neck supple.      Right lower leg: No edema.      Left lower leg: No edema.   Skin:     General: Skin is warm and dry.   Neurological:      General: No focal deficit present.      Mental Status: She is alert and oriented to person, place, and time.      Motor: Weakness present.   Psychiatric:         Mood and Affect: Mood normal.         Fluids  No intake or output data in the 24 hours ending 09/09/24 1348       Laboratory  Recent Labs     09/07/24  0804 09/08/24  0116 09/09/24  0108   WBC 7.8 4.0* 7.6   RBC 5.31 4.73 4.70   HEMOGLOBIN 16.4* 14.4 14.4    HEMATOCRIT 50.2* 44.2 44.0   MCV 94.5 93.4 93.6   MCH 30.9 30.4 30.6   MCHC 32.7 32.6 32.7   RDW 50.9* 49.4 49.0   PLATELETCT 250 229 235   MPV 10.6 10.3 10.7     Recent Labs     09/07/24  0804 09/08/24  0116 09/09/24  0108   SODIUM 135 134* 134*   POTASSIUM 4.6 4.2 4.3   CHLORIDE 99 99 98   CO2 27 24 25   GLUCOSE 96 133* 107*   BUN 12 11 19   CREATININE 0.54 0.34* 0.48*   CALCIUM 9.0 8.4 8.4             Recent Labs     09/08/24  0116   TRIGLYCERIDE 57   HDL 70   LDL 70       Imaging  CT-CTA CHEST PULMONARY ARTERY W/ RECONS   Final Result         1. No pulmonary embolism or other acute process.   2. Severe emphysema.            DX-KNEE 3 VIEWS RIGHT   Final Result         1. No acute process.   2. Moderate DJD.   3. Osteopenia.      DX-CHEST-PORTABLE (1 VIEW)   Final Result      No acute process. Emphysema.           Assessment/Plan  * Acute respiratory failure with hypoxemia (HCC)- (present on admission)  Assessment & Plan  Patient has been having shortness of breath since August 5.  Her shortness of breath has considerably improved for a while then gotten much worse.  Currently she is complaining of severity of breathing and patient was brought in by the son for evaluation.  Patient is still positive for COVID-19 infection.  The patient's initial infection was positive on August 5.  She is again positive for it on September 7.  Patient will need at this point continue oxygen support to keep oxygen saturations above 90%.  Patient to continue oxygen/dexamethasone on hospice for comfort    Advanced directives, counseling/discussion  Assessment & Plan  9/8:  Patient stated she wants to go on hospice care as she feels like it is her time to go and she does not want to worry about taking medications, eating when she is not hungry, drinking when she is not thirsty.  She discussed this with her family and then I later came to bedside to have a family meeting to answer all questions.  Initially we discussed palliative  care if she had further questions about end-of-life but she felt that questions were answered as well as family.  Hospice referral placed and  notified of patient's wishes.  Patient's family agreeable that patient will need caregivers at home as they have been trying to take care of her but feel that patient will injure herself especially with transfers if they continue to do so.  Time at bedside approximately 35 minutes for conversation with patient and family.    Chronic idiopathic constipation- (present on admission)  Assessment & Plan  Patient has chronic idiopathic constipation may be related to her polio  Patient at home has tried magnesium citrate and other over-the-counter remedies but she says it has not worked.  I am going to initiate her on lactulose 15 mL twice daily    Right knee sprain- (present on admission)  Assessment & Plan  Patient recently has sprained her right knee this may be a combination of COVID infection, polio, with the weakness from the combination of things the patient has now injured her knee to the point where she has a hard time independently ambulating and doing things.  Prior to this she was pretty independent with a wheelchair as she has had polio since age 5.    Patient to see physical therapy today with recommendations for at home exercises to rehab her right knee.      DNR (do not resuscitate)- (present on admission)  Assessment & Plan  Discussed advanced directives with the patient and her son at bedside and she wishes to be DO NOT RESUSCITATE CODE STATUS.    Elevated alkaline phosphatase level- (present on admission)  Assessment & Plan  Obtain a GGT level.    Polycythemia- (present on admission)  Assessment & Plan  Chronic polycythemia currently does not need phlebotomy most recent H&H is 16.4 and 50.2    COVID-19 long hauler- (present on admission)  Assessment & Plan  Initially positive on August 5  Repeat test today positive still  Patient at this point will  need Decadron to help her with the shortness of breath and a COVID-19 infection.  She has never had her vaccination for it  Patient most likely is a COVID long-hauler although the possibility of her getting a reinfection is not ruled out.  Initiate zinc vitamin C and vitamin D.    Acquired hypothyroidism- (present on admission)  Assessment & Plan  Continue [place] Thyroid 45 mg daily  Most recent TSH 0.868 and T3 was 3.86    Poliomyelitis (historic)- (present on admission)  Assessment & Plan  Patient was diagnosed with polio at age 5.  She has difficulties with transfers and lower extremity weakness.  She utilizes a wheelchair most of the time  Recently she fell and sprained her right knee and since then she has been having trouble getting back to her baseline.    Patient does not want to go to rehab she wants to go home on hospice         VTE prophylaxis:   SCDs/TEDs      I have performed a physical exam and reviewed and updated ROS and Plan today (9/9/2024). In review of yesterday's note (9/8/2024), there are no changes except as documented above.

## 2024-09-09 NOTE — DISCHARGE PLANNING
Referral Management Team    Received hospice referral via [facility].    Choice obtained for: [facility]  Agency acceptance status:    Family involved in care: Yes  Support available at home:  Placement needed: Yes    Barriers to discharge:   Care team members informed:    Family has meeting with [facility] today at 12:30    1330  Called [facility] to received update. They are still in the hospice meeting with family.     1345  Jennifer from [facility] called.   Patient to DC tomorrow transport requested 10:00

## 2024-09-09 NOTE — RESPIRATORY CARE
"   COPD EDUCATION by COPD CLINICAL EDUCATOR  9/9/2024 at 9:07 AM by Ambreen Farris RRT     Patient reviewed by COPD education team. Patient does not have a history or formal diagnosis of COPD and is a former smoker.  However, patient does not qualify for the COPD program.    COPD Screen  COPD Risk Screening  Do you have a history of COPD?: No  COPD Population Screener  During the past 4 weeks, how much did you feel short of breath?: None/Little of the time  Do you ever cough up any mucus or phlegm?: No/only with occasional colds or infections  In the past 12 months, you do less than you used to because of your breathing problems: Disagree/unsure  Have you smoked at least 100 cigarettes in your entire life?: No/don't know  How old are you?: 50-59  COPD Screening Score: 1    COPD Assessment  COPD Clinical Specialists ONLY  COPD Education Initiated: No--Quick Screen (No hx dx COPD, NO PFT former smoker, No maintenance medications, has Albuterol inhaler,pt does not want to continue w/ aggressive tx, wants to go home on hospice for comfort.)  Interdisciplinary Rounds: Attendance at Rounds (30 Min)    PFT Results    No results found for: \"PFT\"    Meds to Beds  [facility] provides bedside medication delivery for all eligible patients at discharge and you have been automatically enrolled in the Meds to Beds Program. Would you like to opt out of this program for any reason?: No - Stay Opted In     MY COPD ACTION PLAN     It is recommended that patients and physicians /healthcare providers complete this action plan together. This plan should be discussed at each physician visit and updated as needed.    The green, yellow and red zones show groups of symptoms of COPD. This list of symptoms is not comprehensive, and you may experience other symptoms. In the \"Actions\" column, your healthcare provider has recommended actions for you to take based on your symptoms.    Patient Name: Norah Mchugh   Date of Birth: " "1940   Last Updated on:     Green Zone:  I am doing well today Actions     Usual activitiy and exercise level   Take daily medications     Usual amounts of cough and phlegm/mucus   Use oxygen as prescribed     Sleep well at night   Continue regular exercise/diet plan     Appetite is good   At all times avoid cigarette smoke, inhaled irritants     Daily Medications (these medications are taken every day):                Yellow Zone:  I am having a bad day or a COPD flare Actions     More breathless than usual   Continue daily medications     I have less energy for my daily activities   Use quick relief inhaler as ordered     Increased or thicker phlegm/mucus   Use oxygen as prescribed     Using quick relief inhaler/nebulizer more often   Get plenty of rest     Swelling of ankles more than usual   Use pursed lip breathing     More coughing than usual   At all times avoid cigarette smoke, inhaled irritants     I feel like I have a \"chest cold\"     Poor sleep and my symptoms woke me up     My appetite is not good     My medicine is not helping      Call provider immediately if symptoms don’t improve     Continue daily medications, add rescue medications:               Medications to be used during a flare up, (as Discussed with Provider):              Red Zone:  I need urgent medical care Actions     Severe shortness of breath even at rest   Call 911 or seek medical care immediately     Not able to do any activity because of breathing      Fever or shaking chills      Feeling confused or very drowsy       Chest pains      Coughing up blood                  "

## 2024-09-09 NOTE — DIETARY
Nutrition Services:      Pt on RD's screening list due to report of poor PO. Pt w/ wt of 48 kg taken on bed scale on 9/8/24. BMI at this weight is 18.16 (underweight). Pt with 2 other measured weights taken this admit and a stated weight. These weights are between 51.71 kg and 52.3 kg. Most recent wt was 52.3 kg taken on 9/9/24. Most recent BMI is 19.79 (WNL). RD questions weight taken on 9/8/24. This weight is significantly different from all other weights recorded.     RD will follow weekly or PRN.

## 2024-09-09 NOTE — PROGRESS NOTES
0700 Received report from Night shift nurse  0934 Performed assessment  Pt is A&Ox4, no complaints of pain, Updated on POC: meet with hospice and family  Call light within reach, hourly rounding in place, bed in lowest position.

## 2024-09-09 NOTE — PROGRESS NOTES
Telemetry Strip     Strip printed: 1350  Measurements from am strip were as follows:  Rhythm: SR  HR: 65-97  Measurements: 0.20/ 0.10/ 0.32  Ectopy: o-r PVC, r PAC, r big, r trig             Normal Values  Rhythm SR  HR Range    Measurements 0.12-0.20 / 0.06-0.10  / 0.30-0.52

## 2024-09-09 NOTE — CARE PLAN
The patient is Stable - Low risk of patient condition declining or worsening    Shift Goals  Clinical Goals: Monitor labs and vitals; prepare for hospice consult; safety; monitor I/Os  Patient Goals: rest and comfort    Progress made toward(s) clinical / shift goals:  Patient has successfully begun expressing bowels.       Problem: Knowledge Deficit - Standard  Goal: Patient and family/care givers will demonstrate understanding of plan of care, disease process/condition, diagnostic tests and medications  Outcome: Progressing  Note: Patient verbalizes understanding of plan of care and barriers to discharge. Patient asks appropriate questions about plan of care.       Problem: Fall Risk  Goal: Patient will remain free from falls  Outcome: Progressing  Note: Fall precautions are in place. Patient uses call light appropriately.       Problem: Pain - Standard  Goal: Alleviation of pain or a reduction in pain to the patient’s comfort goal  Outcome: Progressing  Note: Patient verbalizes pain using 0-10 scale. Patient uses pharmacological and non-pharmacological methods of pain management.         Patient is not progressing towards the following goals:

## 2024-09-10 ENCOUNTER — PHARMACY VISIT (OUTPATIENT)
Dept: PHARMACY | Facility: MEDICAL CENTER | Age: 84
End: 2024-09-10
Payer: COMMERCIAL

## 2024-09-10 VITALS
TEMPERATURE: 98.9 F | RESPIRATION RATE: 20 BRPM | BODY MASS INDEX: 19.68 KG/M2 | WEIGHT: 115.3 LBS | DIASTOLIC BLOOD PRESSURE: 65 MMHG | HEART RATE: 73 BPM | SYSTOLIC BLOOD PRESSURE: 129 MMHG | OXYGEN SATURATION: 96 % | HEIGHT: 64 IN

## 2024-09-10 PROCEDURE — 700111 HCHG RX REV CODE 636 W/ 250 OVERRIDE (IP): Performed by: HOSPITALIST

## 2024-09-10 PROCEDURE — 99239 HOSP IP/OBS DSCHRG MGMT >30: CPT | Performed by: INTERNAL MEDICINE

## 2024-09-10 PROCEDURE — A9270 NON-COVERED ITEM OR SERVICE: HCPCS | Performed by: HOSPITALIST

## 2024-09-10 PROCEDURE — 700102 HCHG RX REV CODE 250 W/ 637 OVERRIDE(OP): Performed by: HOSPITALIST

## 2024-09-10 PROCEDURE — RXMED WILLOW AMBULATORY MEDICATION CHARGE: Performed by: INTERNAL MEDICINE

## 2024-09-10 RX ORDER — DEXAMETHASONE 2 MG/1
6 TABLET ORAL 2 TIMES DAILY
Qty: 42 TABLET | Refills: 0 | Status: SHIPPED | OUTPATIENT
Start: 2024-09-10 | End: 2024-09-17

## 2024-09-10 RX ADMIN — DEXAMETHASONE SODIUM PHOSPHATE 6 MG: 4 INJECTION INTRA-ARTICULAR; INTRALESIONAL; INTRAMUSCULAR; INTRAVENOUS; SOFT TISSUE at 08:51

## 2024-09-10 RX ADMIN — ZINC SULFATE 220 MG (50 MG) CAPSULE 220 MG: CAPSULE at 08:52

## 2024-09-10 RX ADMIN — OXYCODONE HYDROCHLORIDE AND ACETAMINOPHEN 1000 MG: 500 TABLET ORAL at 08:53

## 2024-09-10 RX ADMIN — LISINOPRIL 10 MG: 5 TABLET ORAL at 08:52

## 2024-09-10 RX ADMIN — THYROID 45 MG: 30 TABLET ORAL at 08:52

## 2024-09-10 RX ADMIN — Medication 1000 UNITS: at 08:53

## 2024-09-10 ASSESSMENT — PAIN DESCRIPTION - PAIN TYPE: TYPE: CHRONIC PAIN

## 2024-09-10 NOTE — CARE PLAN
The patient is Stable - Low risk of patient condition declining or worsening    Shift Goals  Clinical Goals: remain free from falls, manage pain at or above 3/10 with mediation per MAR, consult hospice today, maintain SPO2 at or above 90% on 2L NC  Patient Goals: rest, manage pain, have a meeting about hospice  Family Goals: Stay updated on POC and meet with hospice    Progress made toward(s) clinical / shift goals:  pt remained free from falls, no complaints of pain, hospice consult done today, SPO2 was maintained at or above 90% on 2L NC, pt to D/C home on hospice tomorrow GMT to pick her up at 12    Problem: Knowledge Deficit - Standard  Goal: Patient and family/care givers will demonstrate understanding of plan of care, disease process/condition, diagnostic tests and medications  Outcome: Progressing     Problem: Skin Integrity  Goal: Skin integrity is maintained or improved  Outcome: Progressing     Problem: Fall Risk  Goal: Patient will remain free from falls  Outcome: Progressing     Problem: Pain - Standard  Goal: Alleviation of pain or a reduction in pain to the patient’s comfort goal  Outcome: Progressing       Patient is not progressing towards the following goals:

## 2024-09-10 NOTE — PROGRESS NOTES
Assumed care, patient awake and alert. No complaints of pain or discomfort. Sitting up in bed eating breakfast. Reviewed plan of care for the day including discharge. Call light within reach, hourly rounding in place.

## 2024-09-10 NOTE — CARE PLAN
The patient is Stable - Low risk of patient condition declining or worsening    Shift Goals  Clinical Goals: Monitor labs and vitals; safety; maintain oxygen requirements  Patient Goals: rest and comfort  Family Goals: Stay updated on POC and meet with hospice    Progress made toward(s) clinical / shift goals: Patient participates in care by allowing medical and non-medical interventions. Patient required O2 assistive device via wall O2 and assistance with ADLs.       Problem: Knowledge Deficit - Standard  Goal: Patient and family/care givers will demonstrate understanding of plan of care, disease process/condition, diagnostic tests and medications  Outcome: Progressing  Note: Patient verbalizes understanding of plan of care and barriers to discharge. Patient asks appropriate questions about plan of care.       Problem: Fall Risk  Goal: Patient will remain free from falls  Outcome: Progressing  Note: Fall precautions are in place. Patient uses call light appropriately.       Problem: Pain - Standard  Goal: Alleviation of pain or a reduction in pain to the patient’s comfort goal  Outcome: Progressing  Note: Patient verbalizes pain using 0-10 scale. Patient uses pharmacological and non-pharmacological methods of pain management.       Patient is not progressing towards the following goals:

## 2024-09-10 NOTE — DISCHARGE SUMMARY
Discharge Summary    CHIEF COMPLAINT ON ADMISSION  Chief Complaint   Patient presents with    Weakness    Shortness of Breath    Knee Pain       Reason for Admission  EMS     Admission Date  9/7/2024    CODE STATUS  DNAR/DNI    HPI & HOSPITAL COURSE  Patient is an 84-year-old female with history of polio and recent diagnosis of COVID on August 5.  She was treated and discharged home however continues to be very short of breath and weak and has not bounced back from her initial diagnosis.  She was started on oxygen and dexamethasone and is feeling slightly better.  She slipped out of bed and sprained her right knee with the recent weakness.  She is still testing positive for COVID and is still very much symptomatic.  Patient called me into her room and stated that she does not want to continue with aggressive treatment and wants to go home on hospice.  Family meeting was held later in the afternoon after she discussed with her children what her wishes are.  Questions were answered and patient and family are agreeable with transition to hospice as long as they can accommodate caregivers.  Caregiver association name given to patient's family and they will look into this and I have notified the  that patient would like hospice if he could coordinate some hospice liaison's to come and meet with her.  Family is agreeable with hospice as long as caregivers are set up.    [facility] and [facility] and are agreeable to sign on for her care.  I have prescribed 7 days worth of dexamethasone as I do believe this will help her feel better while she is on hospice.  Patient looking forward to discharge home.    Therefore, she is discharged in fair and stable condition to hospice.    The patient met 2-midnight criteria for an inpatient stay at the time of discharge.    Discharge Date  9/10/24    FOLLOW UP ITEMS POST DISCHARGE  [facility]    DISCHARGE DIAGNOSES  Principal Problem:    Acute  respiratory failure with hypoxemia (HCC) (POA: Yes)  Active Problems:    Poliomyelitis (historic) (POA: Yes)      Overview: Diagnosed when she was a child, doing well until 63 yo, s/p hip       replacement in 2007, then develops chronic right knee pain and LE       weakness. She needs to use wheelchair for approximately 2-3 years.       IMO load March 2020    Acquired hypothyroidism (POA: Yes)    COVID-19 long hauler (POA: Yes)    Polycythemia (POA: Yes)    Elevated alkaline phosphatase level (POA: Yes)    DNR (do not resuscitate) (POA: Yes)    Right knee sprain (POA: Yes)    Chronic idiopathic constipation (POA: Yes)    Advanced directives, counseling/discussion (POA: Unknown)  Resolved Problems:    * No resolved hospital problems. *      FOLLOW UP  No future appointments.  [facility] [address]  [address]  [facility] [address]  [phone]  Follow up        MEDICATIONS ON DISCHARGE     Medication List        START taking these medications        Instructions   dexamethasone 2 MG tablet  Commonly known as: Decadron   Take 3 Tablets by mouth 2 times a day for 7 days.  Dose: 6 mg            CONTINUE taking these medications        Instructions   albuterol 108 (90 Base) MCG/ACT Aers inhalation aerosol   Inhale 2 Puffs every four hours as needed for Shortness of Breath.  Dose: 2 Puff     Magnesium Citrate 1.745 GM/30ML Soln   Take 30 mL by mouth 1 time a day as needed (constipation). Indications: Constipation  Dose: 30 mL     TYLENOL 500 MG Tabs  Generic drug: acetaminophen   Take 500 mg by mouth every 6 hours as needed for Mild Pain or Moderate Pain. Indications: Pain  Dose: 500 mg            STOP taking these medications      [place] Thyroid 15 MG Tabs  Generic drug: thyroid     VITAMIN D PO              Allergies  Allergies   Allergen Reactions    Other Drug Nausea     An antibiotic, name unknown    Milk Protein Nausea       DIET  Orders Placed This Encounter   Procedures    Diet Order Diet: 2 Gram  Sodium     Standing Status:   Standing     Number of Occurrences:   1     Order Specific Question:   Diet:     Answer:   2 Gram Sodium [7]       ACTIVITY  As tolerated.  Weight bearing as tolerated    CONSULTATIONS  None    PROCEDURES  None    LABORATORY  Lab Results   Component Value Date    SODIUM 134 (L) 09/09/2024    POTASSIUM 4.3 09/09/2024    CHLORIDE 98 09/09/2024    CO2 25 09/09/2024    GLUCOSE 107 (H) 09/09/2024    BUN 19 09/09/2024    CREATININE 0.48 (L) 09/09/2024        Lab Results   Component Value Date    WBC 7.6 09/09/2024    HEMOGLOBIN 14.4 09/09/2024    HEMATOCRIT 44.0 09/09/2024    PLATELETCT 235 09/09/2024        Total time of the discharge process exceeds 37 minutes.

## 2024-09-10 NOTE — PROGRESS NOTES
Telemetry Shift Summary     Rhythm: SR  Rate: 60-83  Measurements: .21/.07/.42  Ectopy (reported by Monitor Tech): rPVC/rPAC     Normal Values  Rhythm: Sinus  HR:   Measurements: 0.12-0.20/0.06-0.10/0.30-0.52

## 2024-09-10 NOTE — DISCHARGE PLANNING
Referral Management Team    Received hospice referral via [facility].    Choice obtained for: [facility]  Agency acceptance status: Accepted    Family involved in care: Yes  Support available at home: Yes  Placement needed: no    Barriers to discharge: None  Care team members informed:Yes    Patient to DC today at noon (time changed)  With [facility]

## 2024-09-10 NOTE — PROGRESS NOTES
Patient discharged in stable condition. IV removed without complication. [facility] transport picked patient up and left the unit at 1245.